# Patient Record
Sex: MALE | Race: WHITE | NOT HISPANIC OR LATINO | Employment: UNEMPLOYED | URBAN - METROPOLITAN AREA
[De-identification: names, ages, dates, MRNs, and addresses within clinical notes are randomized per-mention and may not be internally consistent; named-entity substitution may affect disease eponyms.]

---

## 2023-03-13 ENCOUNTER — TELEPHONE (OUTPATIENT)
Dept: FAMILY MEDICINE | Facility: CLINIC | Age: 28
End: 2023-03-13

## 2023-03-13 NOTE — TELEPHONE ENCOUNTER
Date: 3/13/2023    Client Name:  Sid Miranda  Preferred Name: Sid  MRN: [id number]   : 1995  Age:  27 year old    Presenting Problem / Reason for Assessment:     Compulsive sexual behavior      Suggested Program:  CSB    Interested in Couples/Family Therapy?  No    Any legal charges pending?:   No    Patient wishes to be contacted regarding Insurance benefits?:  No    Insurance Benefits to be evaluated.  Note will be entered when validated.    Please Verify Registration    Please send Welcome Packet and document date sent.

## 2023-03-21 ENCOUNTER — VIRTUAL VISIT (OUTPATIENT)
Dept: PSYCHOLOGY | Facility: CLINIC | Age: 28
End: 2023-03-21
Payer: COMMERCIAL

## 2023-03-21 DIAGNOSIS — F32.A DEPRESSION, UNSPECIFIED DEPRESSION TYPE: ICD-10-CM

## 2023-03-21 DIAGNOSIS — F41.1 GENERALIZED ANXIETY DISORDER: ICD-10-CM

## 2023-03-21 DIAGNOSIS — F15.20 SEVERE STIMULANT USE DISORDER (H): Primary | ICD-10-CM

## 2023-03-21 PROCEDURE — 90791 PSYCH DIAGNOSTIC EVALUATION: CPT | Mod: VID | Performed by: STUDENT IN AN ORGANIZED HEALTH CARE EDUCATION/TRAINING PROGRAM

## 2023-03-21 ASSESSMENT — COLUMBIA-SUICIDE SEVERITY RATING SCALE - C-SSRS
TOTAL  NUMBER OF INTERRUPTED ATTEMPTS LIFETIME: YES
ATTEMPT LIFETIME: YES
6. HAVE YOU EVER DONE ANYTHING, STARTED TO DO ANYTHING, OR PREPARED TO DO ANYTHING TO END YOUR LIFE?: YES
1. IN THE PAST MONTH, HAVE YOU WISHED YOU WERE DEAD OR WISHED YOU COULD GO TO SLEEP AND NOT WAKE UP?: NO
TOTAL  NUMBER OF INTERRUPTED ATTEMPTS PAST 3 MONTHS: NO
REASONS FOR IDEATION LIFETIME: EQUALLY TO GET ATTENTION, REVENGE, OR A REACTION FROM OTHERS AND TO END/STOP THE PAIN
LETHALITY/MEDICAL DAMAGE CODE MOST LETHAL ACTUAL ATTEMPT: NO PHYSICAL DAMAGE OR VERY MINOR PHYSICAL DAMAGE
1. HAVE YOU WISHED YOU WERE DEAD OR WISHED YOU COULD GO TO SLEEP AND NOT WAKE UP?: YES
5. HAVE YOU STARTED TO WORK OUT OR WORKED OUT THE DETAILS OF HOW TO KILL YOURSELF? DO YOU INTEND TO CARRY OUT THIS PLAN?: NO
2. HAVE YOU ACTUALLY HAD ANY THOUGHTS OF KILLING YOURSELF?: NO
4. HAVE YOU HAD THESE THOUGHTS AND HAD SOME INTENTION OF ACTING ON THEM?: YES
TOTAL  NUMBER OF ABORTED OR SELF INTERRUPTED ATTEMPTS PAST 3 MONTHS: NO
2. HAVE YOU ACTUALLY HAD ANY THOUGHTS OF KILLING YOURSELF?: YES
6. HAVE YOU EVER DONE ANYTHING, STARTED TO DO ANYTHING, OR PREPARED TO DO ANYTHING TO END YOUR LIFE?: NO
ATTEMPT PAST THREE MONTHS: NO
3. HAVE YOU BEEN THINKING ABOUT HOW YOU MIGHT KILL YOURSELF?: YES
TOTAL  NUMBER OF PREPARATORY ACTS LIFETIME: 1
TOTAL  NUMBER OF INTERRUPTED ATTEMPTS LIFETIME: 1
5. HAVE YOU STARTED TO WORK OUT OR WORKED OUT THE DETAILS OF HOW TO KILL YOURSELF? DO YOU INTEND TO CARRY OUT THIS PLAN?: YES
TOTAL  NUMBER OF ACTUAL ATTEMPTS LIFETIME: 3
4. HAVE YOU HAD THESE THOUGHTS AND HAD SOME INTENTION OF ACTING ON THEM?: NO
TOTAL  NUMBER OF ABORTED OR SELF INTERRUPTED ATTEMPTS LIFETIME: YES
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
## 2023-03-21 NOTE — NURSING NOTE
Is the patient currently in the state of [place]? YES    Visit mode:VIDEO    If the visit is dropped, the patient can be reconnected by: VIDEO VISIT: Text to cell phone: [phone]    Will anyone else be joining the visit? NO      How would you like to obtain your AVS? Mail a copy    Are changes needed to the allergy or medication list? NO    Reason for visit: Video Visit Return

## 2023-04-04 ENCOUNTER — VIRTUAL VISIT (OUTPATIENT)
Dept: PSYCHOLOGY | Facility: CLINIC | Age: 28
End: 2023-04-04
Payer: COMMERCIAL

## 2023-04-04 DIAGNOSIS — F32.A DEPRESSION, UNSPECIFIED DEPRESSION TYPE: ICD-10-CM

## 2023-04-04 DIAGNOSIS — F41.1 GENERALIZED ANXIETY DISORDER: ICD-10-CM

## 2023-04-04 DIAGNOSIS — F15.20 SEVERE STIMULANT USE DISORDER (H): Primary | ICD-10-CM

## 2023-04-04 PROCEDURE — 90837 PSYTX W PT 60 MINUTES: CPT | Mod: VID | Performed by: STUDENT IN AN ORGANIZED HEALTH CARE EDUCATION/TRAINING PROGRAM

## 2023-04-04 ASSESSMENT — ANXIETY QUESTIONNAIRES
6. BECOMING EASILY ANNOYED OR IRRITABLE: NOT AT ALL
4. TROUBLE RELAXING: SEVERAL DAYS
2. NOT BEING ABLE TO STOP OR CONTROL WORRYING: SEVERAL DAYS
8. IF YOU CHECKED OFF ANY PROBLEMS, HOW DIFFICULT HAVE THESE MADE IT FOR YOU TO DO YOUR WORK, TAKE CARE OF THINGS AT HOME, OR GET ALONG WITH OTHER PEOPLE?: SOMEWHAT DIFFICULT
7. FEELING AFRAID AS IF SOMETHING AWFUL MIGHT HAPPEN: NOT AT ALL
IF YOU CHECKED OFF ANY PROBLEMS ON THIS QUESTIONNAIRE, HOW DIFFICULT HAVE THESE PROBLEMS MADE IT FOR YOU TO DO YOUR WORK, TAKE CARE OF THINGS AT HOME, OR GET ALONG WITH OTHER PEOPLE: SOMEWHAT DIFFICULT
GAD7 TOTAL SCORE: 6
GAD7 TOTAL SCORE: 6
3. WORRYING TOO MUCH ABOUT DIFFERENT THINGS: SEVERAL DAYS
5. BEING SO RESTLESS THAT IT IS HARD TO SIT STILL: SEVERAL DAYS
7. FEELING AFRAID AS IF SOMETHING AWFUL MIGHT HAPPEN: NOT AT ALL
GAD7 TOTAL SCORE: 6
1. FEELING NERVOUS, ANXIOUS, OR ON EDGE: MORE THAN HALF THE DAYS

## 2023-04-04 NOTE — PROGRESS NOTES
[facility] - Progress Note    Date of Service: 23   Name: Sid Miranda  : 1995  Medical Record Number: [id number]  Treating Provider:       Francisco Cannon, Ph.D  Postdoctoral Fellow  Type of Session: Individual  Present in Session: Client and therapist  Session Start and Stop Time: 9:01am-9:55am  Number of Minutes:  54    SERVICE MODALITY:  Video Visit:      Provider verified identity through the following two step process.  Patient provided:  Patient is known previously to provider and Patient was verified at admission/transfer    Telemedicine Visit: The patient's condition can be safely assessed and treated via synchronous audio and visual telemedicine encounter.      Reason for Telemedicine Visit: Patient has requested telehealth visit    Originating Site (Patient Location): Patient's home    Distant Site (Provider Location): [facility]    Consent:  The patient/guardian has verbally consented to: the potential risks and benefits of telemedicine (video visit) versus in person care; bill my insurance or make self-payment for services provided; and responsibility for payment of non-covered services.     Patient would like the video invitation sent by:  My Chart    Mode of Communication:  Video Conference via Ondango    Distant Location (Provider):  On-site    As the provider I attest to compliance with applicable laws and regulations related to telemedicine.    DSM-5 Diagnoses:  Encounter Diagnoses   Name Primary?     Severe stimulant use disorder (H) Yes     Generalized anxiety disorder      Depression, unspecified depression type          Current Reported Symptoms and Status update:  Changes since last session include recent methamphetamine use, some anxiety, some depression. Client has scheduled intake for inpatient substance use treatment for 23.    Progress Toward Treatment Goals:   No improvement     Therapeutic  Interventions/Treatment Strategies:    Area(s) of treatment focus addressed in this session included Symptom Management, Personal Safety/Harm Reduction and Robertson Maintenance/Relapse Prevention    Client reported that he is in a current methamphetamine use episode, with most recent use occurring last night. He reported he was able to participate in the session today and was not currently intoxicated. Client reported this has his most severe relapse due to frequency, quantity, and feeling isolated. He scheduled an intake for inpatient treatment at [facility] for Thursday. Therapist assessed safety. Client reported hopelessness and passive thoughts of death. He denied intent, plan, and means. He identified reasons to live and hope for change. Client reported he is considering moving back to [place]. He noted he needs to eat and sleep to feel better. Therapist encouraged client to eat during the session, which client did. Client and therapist discussed coping/DBT strategies to support client to getting to his intake on Thursday. Client stated he would use coping strategies. He expressed optimism about treatment. He requested to contact therapist tomorrow via Epic message or voicemail as motivation to continue using coping and attend his inpatient intake. Therapist agreed and provided client with therapist's office number. We cancelled client's next session due to plans to be in inpatient treatment.    Psychotherapist offered support, feedback and validation and reinforced use of skills Treatment modalities used include Motivational Interviewing Cognitive Behavioral Therapy  Support, Redirection and Feedback    Patient Response:   Patient responded to session by accepting feedback, giving feedback, listening, focusing on goals and accepting support  Possible barriers to participation / learning include: withdrawal symptoms and severity of symptoms    Current Mental Status Exam:   Appearance:  Appropriate   Eye  Contact:  Fair   Attitude / Demeanor: Cooperative  Suspicious   Speech      Rate / Production: Normal/ Responsive      Volume:  Normal  volume  Orientation:  All  Mood:   Depressed   Affect:   Blunted   Thought Content: Clear   Insight:   Fair       Plan/Need for Future Services:  Return for therapy in 3 weeks to treat diagnosed problems.    A treatment plan was not established today due to concerns of client's recent substance use and need for higher level of care. Will establish treatment plan in future follow-up.    Referral / Collaboration:  Pt will start inpatient substance use treatment.  Emergency Services Needed?  No    Assignment:  Attend intake appt for inpatient treatment.    Interactive Complexity:  There are four specific communication difficulties that complicate the work of the primary psychiatric procedure.  Interactive complexity (+[id number]) may be reported when at least one of these difficulties is present.    Communication difficulties present during current the psychiatric procedure include:  1. None.      Signature/Title:      Francisco Cannon, PhD, LP    [facility], [facility] CAnswers for HPI/ROS submitted by the patient on 4/4/2023  LORENZO 7 TOTAL SCORE: 6    antonioic  Department of Family Medicine and Community Health  [employer]

## 2023-04-04 NOTE — NURSING NOTE
Is the patient currently in the state of [place]? YES    Visit mode:VIDEO    If the visit is dropped, the patient can be reconnected by: VIDEO VISIT: Send to e-mail at: [email or web address]    Will anyone else be joining the visit? NO      How would you like to obtain your AVS? Mail a copy    Are changes needed to the allergy or medication list? NO    Reason for visit: Video Visit Return

## 2023-04-18 ENCOUNTER — VIRTUAL VISIT (OUTPATIENT)
Dept: PSYCHOLOGY | Facility: CLINIC | Age: 28
End: 2023-04-18
Payer: COMMERCIAL

## 2023-04-18 DIAGNOSIS — F32.A DEPRESSION, UNSPECIFIED DEPRESSION TYPE: ICD-10-CM

## 2023-04-18 DIAGNOSIS — F15.20 SEVERE STIMULANT USE DISORDER (H): Primary | ICD-10-CM

## 2023-04-18 DIAGNOSIS — F41.1 GENERALIZED ANXIETY DISORDER: ICD-10-CM

## 2023-04-18 PROCEDURE — 90837 PSYTX W PT 60 MINUTES: CPT | Mod: VID | Performed by: STUDENT IN AN ORGANIZED HEALTH CARE EDUCATION/TRAINING PROGRAM
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
## 2023-04-18 NOTE — NURSING NOTE
Is the patient currently in the state of [place]? YES    Visit mode:VIDEO    If the visit is dropped, the patient can be reconnected by: VIDEO VISIT:  Send e-mail to at [email or web address]    Will anyone else be joining the visit? No  (If patient encounters technical issues they should call [phone])    How would you like to obtain your AVS? MyChart    Are changes needed to the allergy or medication list? N/A    Rooming Documentation: Questionnaire(s) not done per department protocol    Reason for visit: Video Visit       BIANKA Murillo

## 2023-04-25 ENCOUNTER — VIRTUAL VISIT (OUTPATIENT)
Dept: PSYCHOLOGY | Facility: CLINIC | Age: 28
End: 2023-04-25
Payer: COMMERCIAL

## 2023-04-25 DIAGNOSIS — F32.A DEPRESSION, UNSPECIFIED DEPRESSION TYPE: ICD-10-CM

## 2023-04-25 DIAGNOSIS — F41.1 GENERALIZED ANXIETY DISORDER: Primary | ICD-10-CM

## 2023-04-25 DIAGNOSIS — F15.20 SEVERE STIMULANT USE DISORDER (H): ICD-10-CM

## 2023-04-25 PROCEDURE — 90837 PSYTX W PT 60 MINUTES: CPT | Mod: VID | Performed by: STUDENT IN AN ORGANIZED HEALTH CARE EDUCATION/TRAINING PROGRAM

## 2023-04-25 NOTE — NURSING NOTE
Is the patient currently in the state of [place]? YES    Visit mode:VIDEO    If the visit is dropped, the patient can be reconnected by: VIDEO VISIT:  Send e-mail to at [email or web address]    Will anyone else be joining the visit? No  (If patient encounters technical issues they should call [phone])    How would you like to obtain your AVS? MyChart    Are changes needed to the allergy or medication list? NO    Rooming Documentation: Patient will complete qnrs in mychart    Reason for visit: Video Visit     BIANKA Narvaez
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
## 2023-05-09 ENCOUNTER — VIRTUAL VISIT (OUTPATIENT)
Dept: PSYCHOLOGY | Facility: CLINIC | Age: 28
End: 2023-05-09
Payer: COMMERCIAL

## 2023-05-09 DIAGNOSIS — F15.20 SEVERE STIMULANT USE DISORDER (H): Primary | ICD-10-CM

## 2023-05-09 DIAGNOSIS — F41.1 GENERALIZED ANXIETY DISORDER: ICD-10-CM

## 2023-05-09 DIAGNOSIS — F32.A DEPRESSION, UNSPECIFIED DEPRESSION TYPE: ICD-10-CM

## 2023-05-09 PROCEDURE — 90837 PSYTX W PT 60 MINUTES: CPT | Mod: VID | Performed by: STUDENT IN AN ORGANIZED HEALTH CARE EDUCATION/TRAINING PROGRAM

## 2023-05-09 NOTE — PROGRESS NOTES
[place] for Sexual [facility] - Progress Note    Date of Service: 23   Name: Sid Miranda  : 1995  Medical Record Number: [id number]  Treating Provider:       Francisco Cannon, Ph.D  Postdoctoral Fellow  Type of Session: Individual  Present in Session: Client and therapist  Session Start and Stop Time: 9:00am-9:55am  Number of Minutes:  55    SERVICE MODALITY:  Video Visit:      Provider verified identity through the following two step process.  Patient provided:  Patient is known previously to provider and Patient was verified at admission/transfer    Telemedicine Visit: The patient's condition can be safely assessed and treated via synchronous audio and visual telemedicine encounter.      Reason for Telemedicine Visit: Patient has requested telehealth visit    Originating Site (Patient Location): Patient's home    Distant Site (Provider Location): [facility]    Consent:  The patient/guardian has verbally consented to: the potential risks and benefits of telemedicine (video visit) versus in person care; bill my insurance or make self-payment for services provided; and responsibility for payment of non-covered services.     Patient would like the video invitation sent by:  My Chart    Mode of Communication:  Video Conference via [employer]    Distant Location (Provider):  On-site    As the provider I attest to compliance with applicable laws and regulations related to telemedicine.    DSM-5 Diagnoses:  Encounter Diagnoses   Name Primary?     Severe stimulant use disorder (H) Yes     Generalized anxiety disorder      Depression, unspecified depression type        Current Reported Symptoms and Status update:  Changes since last session include abstinence from meth for 30 days, intake for substance use disorder IOP scheduled, improved depression, some anxiety and worry, working with his sponsor, has a job interview.    Generalized Anxiety Disorder: Excessive  anxiety and worry about a number of events or activities (such as work or school performance); difficulty controlling worry; restlessness or feeling keyed up or on edge; difficulty concentrating or mind going blank; irritability.      Unspecified Depressive Disorder: Client reports symptoms of depression that do not meet full criteria for diagnosis of MDD. Sometimes include hopelessness, depressed mood, irritability, and anhedonia.      Severe Stimulant Use Disorder: Client has a history of methamphetamine use that significantly negatively impacts functioning, including financial stress, loss of employment, and loss of relationships. He continued to use despite negative consequences. Client has a history of sexual behavior he describes as compulsive when using meth. Further assessment needed to determine if compulsive sexual behaviors also occur separately from meth use.     Progress Toward Treatment Goals:   Satisfactory progress     Diagnostic assessment 3/21/23  Tx plan 4/18/23 5/9/23 - sober from meth 30 days    Therapeutic Interventions/Treatment Strategies:    Area(s) of treatment focus addressed in this session included Symptom Management and Sexual Health and Wellness    Client shared that he is 1 month sober from meth and has an intake scheduled with [facility]. He also has a job interview. We reviewed client's current sources of support, including 4 12-step groups per week, weekly meetings with his sponsor, friends, and family. Client reported his anxiety and depression have improved. He processed having low self-esteem. He described how he often feels like others are judging him for his behavior. Therapist and client discussed how this may contribute to depression and anxiety. We discussed long-term for plans to address identify development and self-esteem as he continues to work towards having a stable foundation. We discussed prioritizing work and IOP treatment for now. Client agreed.    Psychotherapist  offered support, feedback and validation and reinforced use of skills Treatment modalities used include Cognitive Behavioral Therapy Interpersonal  Support and Feedback    Patient Response:   Patient responded to session by accepting feedback, giving feedback, listening, focusing on goals and accepting support  Possible barriers to participation / learning include: N/A    Current Mental Status Exam:   Appearance:  Appropriate   Eye Contact:  Good   Attitude / Demeanor: Cooperative   Speech      Rate / Production: Normal/ Responsive      Volume:  Normal  volume  Orientation:  All  Mood:   Normal  Affect:   Appropriate   Thought Content: Clear   Insight:   Good       Plan/Need for Future Services:  Return for therapy in 2 weeks to treat diagnosed problems.    Patient has a current master individualized treatment plan.  See Epic treatment plan for more information.    Referral / Collaboration:  Referral to another professional/service is not indicated at this time..  Emergency Services Needed?  No    Assignment:  IOP Intake  Continue self-care and social support    Interactive Complexity:  There are four specific communication difficulties that complicate the work of the primary psychiatric procedure.  Interactive complexity (+11213) may be reported when at least one of these difficulties is present.    Communication difficulties present during current the psychiatric procedure include:  1. None.      Signature/Title:    Francisco Cannon, PhD, LP    [place] [facility] Sexual [facility], Sexual and Gender Health Clinic  Department of Family Medicine and Community Health  [employer] [facility] [employer]

## 2023-05-23 ENCOUNTER — VIRTUAL VISIT (OUTPATIENT)
Dept: PSYCHOLOGY | Facility: CLINIC | Age: 28
End: 2023-05-23
Payer: COMMERCIAL

## 2023-05-23 DIAGNOSIS — F32.A DEPRESSION, UNSPECIFIED DEPRESSION TYPE: ICD-10-CM

## 2023-05-23 DIAGNOSIS — F41.1 GENERALIZED ANXIETY DISORDER: ICD-10-CM

## 2023-05-23 DIAGNOSIS — F15.20 SEVERE STIMULANT USE DISORDER (H): Primary | ICD-10-CM

## 2023-05-23 PROCEDURE — 90837 PSYTX W PT 60 MINUTES: CPT | Mod: VID | Performed by: STUDENT IN AN ORGANIZED HEALTH CARE EDUCATION/TRAINING PROGRAM

## 2023-05-23 NOTE — PROGRESS NOTES
[place] [facility] - Progress Note    Date of Service: 23   Name: Sid Miranda  : 1995  Medical Record Number: [id number]  Treating Provider:       Francisco Cannon, Ph.D  Type of Session: Individual  Present in Session: Client and therapist  Session Start and Stop Time: 9:03am-9:56am  Number of Minutes:  53    SERVICE MODALITY:  Video Visit:      Provider verified identity through the following two step process.  Patient provided:  Patient is known previously to provider and Patient was verified at admission/transfer    Telemedicine Visit: The patient's condition can be safely assessed and treated via synchronous audio and visual telemedicine encounter.      Reason for Telemedicine Visit: Patient has requested telehealth visit    Originating Site (Patient Location): Patient's home    Distant Site (Provider Location): [facility]    Consent:  The patient/guardian has verbally consented to: the potential risks and benefits of telemedicine (video visit) versus in person care; bill my insurance or make self-payment for services provided; and responsibility for payment of non-covered services.     Patient would like the video invitation sent by:  My Chart    Mode of Communication:  Video Conference via [employer]    Distant Location (Provider):  On-site    As the provider I attest to compliance with applicable laws and regulations related to telemedicine.    DSM-5 Diagnoses:  Encounter Diagnoses   Name Primary?     Severe stimulant use disorder (H) Yes     Generalized anxiety disorder      Depression, unspecified depression type          Current Reported Symptoms and Status update:  Changes since last session include sobriety from meth, continuing to engage social support and 12-step programming, improvement in mood, anxiety reasonably controlled.    Generalized Anxiety Disorder: Excessive anxiety and worry about a number of events or activities  (such as work or school performance); difficulty controlling worry; restlessness or feeling keyed up or on edge; difficulty concentrating or mind going blank; irritability.      Unspecified Depressive Disorder: Client reports symptoms of depression that do not meet full criteria for diagnosis of MDD. Sometimes include hopelessness, depressed mood, irritability, and anhedonia.      Severe Stimulant Use Disorder: Client has a history of methamphetamine use that significantly negatively impacts functioning, including financial stress, loss of employment, and loss of relationships. He continued to use despite negative consequences. Client has a history of sexual behavior he describes as compulsive when using meth. Further assessment needed to determine if compulsive sexual behaviors also occur separately from meth use.       Progress Toward Treatment Goals:   Satisfactory progress     Diagnostic assessment 3/21/23  Tx plan 4/18/23 5/9/23 - sober from meth 30 days      Therapeutic Interventions/Treatment Strategies:    Area(s) of treatment focus addressed in this session included Symptom Management and Interpersonal Relationship Skills    Client shared that he has maintained sobriety from methamphetamine. We reviewed his supports and community he is building through 12-step programs. He attends 4 groups per week. Client shared that he is hoping to soon be employed by the [employer] and recently did a work physical. He is looking forward to more structure and financial security. Client processed challenging relationship dynamics with his friend, as well as emerging relationship dynamics with a potential new partner. He discussed choosing to not have sex right now. He reviewed distress tolerance skills he is practicing related to meth and sex. We discussed boundaries he might consider for sexual behavior to support his recovery. Client assigned to create boundaries in line with sexual/relationship  values.    Psychotherapist offered support, feedback and validation and reinforced use of skills Treatment modalities used include Cognitive Behavioral Therapy Interpersonal  Support and Feedback    Patient Response:   Patient responded to session by accepting feedback, giving feedback, listening, focusing on goals and accepting support  Possible barriers to participation / learning include: N/A    Current Mental Status Exam:   Appearance:  Appropriate   Eye Contact:  Good   Attitude / Demeanor: Cooperative   Speech      Rate / Production: Normal/ Responsive      Volume:  Normal  volume  Orientation:  All  Mood:   Normal  Affect:   Appropriate   Thought Content: Clear   Insight:   Good       Plan/Need for Future Services:  Return for therapy in 2 weeks to treat diagnosed problems.    Patient has a current master individualized treatment plan.  See Epic treatment plan for more information.    Referral / Collaboration:  Referral to another professional/service is not indicated at this time..  Emergency Services Needed?  No    Assignment:  Boundaries    Interactive Complexity:  There are four specific communication difficulties that complicate the work of the primary psychiatric procedure.  Interactive complexity (+76438) may be reported when at least one of these difficulties is present.    Communication difficulties present during current the psychiatric procedure include:  1. None.      Signature/Title:           Francisco Cannon, PhD, LP    [place] [facility], [facility]  Department of Family Medicine and Community Health  [facility] [employer]

## 2023-05-23 NOTE — NURSING NOTE
Is the patient currently in the state of [place]? YES    Visit mode:VIDEO    If the visit is dropped, the patient can be reconnected by: VIDEO VISIT:  Send e-mail to at [email or web address]    Will anyone else be joining the visit? No  (If patient encounters technical issues they should call [phone])    How would you like to obtain your AVS? MyChart    Are changes needed to the allergy or medication list? N/A    Rooming Documentation: Questionnaire(s) not done per department protocol.    Reason for visit: RECHECK     BIANKA Guerrero

## 2023-06-19 ENCOUNTER — VIRTUAL VISIT (OUTPATIENT)
Dept: PSYCHOLOGY | Facility: CLINIC | Age: 28
End: 2023-06-19
Payer: MEDICAID

## 2023-06-19 DIAGNOSIS — F15.20 SEVERE STIMULANT USE DISORDER (H): ICD-10-CM

## 2023-06-19 DIAGNOSIS — F32.A DEPRESSION, UNSPECIFIED DEPRESSION TYPE: ICD-10-CM

## 2023-06-19 DIAGNOSIS — F41.1 GENERALIZED ANXIETY DISORDER: Primary | ICD-10-CM

## 2023-06-19 PROCEDURE — 90837 PSYTX W PT 60 MINUTES: CPT | Mod: VID | Performed by: STUDENT IN AN ORGANIZED HEALTH CARE EDUCATION/TRAINING PROGRAM

## 2023-06-19 NOTE — PROGRESS NOTES
[place] [facility] - Progress Note    Date of Service: 23   Name: Sid Miranda  : 1995  Medical Record Number: [id number]  Treating Provider: Frnacisco Cannon, Ph.D   Type of Session: Individual  Present in Session: Client and therapist  Session Start and Stop Time: 5:01pm-5:55pm  Number of Minutes:  54    SERVICE MODALITY:  Video Visit:      Provider verified identity through the following two step process.  Patient provided:  Patient is known previously to provider and Patient was verified at admission/transfer    Telemedicine Visit: The patient's condition can be safely assessed and treated via synchronous audio and visual telemedicine encounter.      Reason for Telemedicine Visit: Patient has requested telehealth visit    Originating Site (Patient Location): Patient's home    Distant Site (Provider Location): [facility]    Consent:  The patient/guardian has verbally consented to: the potential risks and benefits of telemedicine (video visit) versus in person care; bill my insurance or make self-payment for services provided; and responsibility for payment of non-covered services.     Patient would like the video invitation sent by:  My Chart    Mode of Communication:  Video Conference via [employer]    Distant Location (Provider):  On-site    As the provider I attest to compliance with applicable laws and regulations related to telemedicine.    DSM-5 Diagnoses:  Encounter Diagnoses   Name Primary?     Generalized anxiety disorder Yes     Depression, unspecified depression type      Severe stimulant use disorder (H)        Current Reported Symptoms and Status update:  Changes since last session include increased anxiety resulting in 1 use episode, resuming abstinence from meth, starting a new job and relationship.      Generalized Anxiety Disorder: Excessive anxiety and worry about a number of events or activities (such as work or school  performance); difficulty controlling worry; restlessness or feeling keyed up or on edge; difficulty concentrating or mind going blank; irritability.      Unspecified Depressive Disorder: Client reports symptoms of depression that do not meet full criteria for diagnosis of MDD. Sometimes include hopelessness, depressed mood, irritability, and anhedonia.      Severe Stimulant Use Disorder: Client has a history of methamphetamine use that significantly negatively impacts functioning, including financial stress, loss of employment, and loss of relationships. He continued to use despite negative consequences. Client has a history of sexual behavior he describes as compulsive when using meth. Further assessment needed to determine if compulsive sexual behaviors also occur separately from meth use.     Progress Toward Treatment Goals:   Satisfactory progress     Diagnostic assessment 3/21/23  Tx plan 4/18/23 5/9/23 - sober from meth 30 days  6/19/23 - 1 use episode approx 2 weeks ago. Sobriety since.    Therapeutic Interventions/Treatment Strategies:    Area(s) of treatment focus addressed in this session included Symptom Management and Interpersonal Relationship Skills    Client shared that he started work and is finding meaning in the work he is doing. He shared long-term plans related to work, including to be hired permanently next year. Client also shared that he is in a new relationship and his partner is staying with him prior to his partner having to report to retirement. Client identified ways in which they are supporting each other emotionally and highlighted his partner's strengths/resiliency. Client processed a recent use episode triggered by anxiety (self-reported as paranoia) related to body image and feeling like people are conspiring against him to trick him into believing he is making progress when he is not. Client described ability to identify this as anxious/paranoid thinking and trying to talk himself out of  it. He said the tension between knowing it is not true and believing it is related to him using in order to stop the internal tension. He identified other ways in which he is able to reduce this tension without using meth, including engaging his support network. We reviewed DBT skills that support him with distress tolerance. Therapist and client discussed client's plans for sobriety and effective coping given his partner's upcoming reporting to half-way.     Psychotherapist offered support, feedback and validation and reinforced use of skills Treatment modalities used include Cognitive Behavioral Therapy Dialectical Behavioral Therapy Interpersonal  Support, Feedback and Structured Activity    Patient Response:   Patient responded to session by accepting feedback, giving feedback, listening, focusing on goals and accepting support  Possible barriers to participation / learning include: N/A    Current Mental Status Exam:   Appearance:  Appropriate   Eye Contact:  Good   Attitude / Demeanor: Cooperative   Speech      Rate / Production: Normal/ Responsive      Volume:  Normal  volume  Orientation:  All  Mood:   Normal  Affect:   Appropriate   Thought Content: Clear   Insight:   Good       Plan/Need for Future Services:  Return for therapy in 2 weeks to treat diagnosed problems.    Patient has a current master individualized treatment plan.  See Epic treatment plan for more information.    Referral / Collaboration:  Referral to another professional/service is not indicated at this time..  Emergency Services Needed?  No    Assignment:  Coping, support network, 12-step programs    Interactive Complexity:  There are four specific communication difficulties that complicate the work of the primary psychiatric procedure.  Interactive complexity (+12813) may be reported when at least one of these difficulties is present.    Communication difficulties present during current the psychiatric procedure  include:  1. None.      Signature/Title:    Francisco Cannon, PhD, LP    [place] [facility], [facility]  Department of Family Medicine and Community Health  [facility]

## 2023-07-10 ENCOUNTER — VIRTUAL VISIT (OUTPATIENT)
Dept: PSYCHOLOGY | Facility: CLINIC | Age: 28
End: 2023-07-10
Payer: COMMERCIAL

## 2023-07-10 DIAGNOSIS — F32.A DEPRESSION, UNSPECIFIED DEPRESSION TYPE: Primary | ICD-10-CM

## 2023-07-10 DIAGNOSIS — F15.20 SEVERE STIMULANT USE DISORDER (H): ICD-10-CM

## 2023-07-10 DIAGNOSIS — F41.1 GENERALIZED ANXIETY DISORDER: ICD-10-CM

## 2023-07-10 PROCEDURE — 90837 PSYTX W PT 60 MINUTES: CPT | Mod: VID | Performed by: STUDENT IN AN ORGANIZED HEALTH CARE EDUCATION/TRAINING PROGRAM

## 2023-07-10 NOTE — NURSING NOTE
Is the patient currently in the state of [place]? YES    Visit mode:VIDEO    If the visit is dropped, the patient can be reconnected by: VIDEO VISIT: Send to e-mail at: [email or web address]    Will anyone else be joining the visit? NO      How would you like to obtain your AVS? MyChart    Are changes needed to the allergy or medication list? NO    Reason for visit: RECHECK

## 2023-07-10 NOTE — PROGRESS NOTES
[place] [facility] - Progress Note    Date of Service: 7/10/23   Name: Sid Miranda  : 1995  Medical Record Number: [id number]  Treating Provider: Francisco Cannon, Ph.D  Type of Session: Individual  Present in Session: Client and therapist  Session Start and Stop Time: 5:00pm-5:55pm  Number of Minutes:  55    SERVICE MODALITY:  Video Visit:      Provider verified identity through the following two step process.  Patient provided:  Patient is known previously to provider and Patient was verified at admission/transfer    Telemedicine Visit: The patient's condition can be safely assessed and treated via synchronous audio and visual telemedicine encounter.      Reason for Telemedicine Visit: Patient has requested telehealth visit    Originating Site (Patient Location): Patient's home    Distant Site (Provider Location): [facility]    Consent:  The patient/guardian has verbally consented to: the potential risks and benefits of telemedicine (video visit) versus in person care; bill my insurance or make self-payment for services provided; and responsibility for payment of non-covered services.     Patient would like the video invitation sent by:  My Chart    Mode of Communication:  Video Conference via [employer]    Distant Location (Provider):  On-site    As the provider I attest to compliance with applicable laws and regulations related to telemedicine.    DSM-5 Diagnoses:  Encounter Diagnoses   Name Primary?     Generalized anxiety disorder      Severe stimulant use disorder (H)      Depression, unspecified depression type Yes         Current Reported Symptoms and Status update:  Changes since last session include increased depression/anxiety and recent meth use (2 episodes).    Generalized Anxiety Disorder: Excessive anxiety and worry about a number of events or activities (such as work or school performance); difficulty controlling worry;  restlessness or feeling keyed up or on edge; difficulty concentrating or mind going blank; irritability.      Unspecified Depressive Disorder: Client reports symptoms of depression that do not meet full criteria for diagnosis of MDD. Sometimes include hopelessness, depressed mood, irritability, and anhedonia.      Severe Stimulant Use Disorder: Client has a history of methamphetamine use that significantly negatively impacts functioning, including financial stress, loss of employment, and loss of relationships. He continued to use despite negative consequences. Client has a history of sexual behavior he describes as compulsive when using meth. Further assessment needed to determine if compulsive sexual behaviors also occur separately from meth use.       Progress Toward Treatment Goals:   No improvement     Therapeutic Interventions/Treatment Strategies:    Area(s) of treatment focus addressed in this session included Symptom Management    Client processed recent meth use episodes (2 total, each lasting 1 day). We discussed antecedents, with client noting particular concerns around body image. We practiced gratitude activities for his body and to reduce body image concerns. Client also processed the effects of depression on self-worth/self-concept. We discussed client starting IOP for meth use. Client plans to call the [facility] today to schedule intake. Client also asked about referrals to see a psychiatrist. Therapist informed client that clinical care coordinator Dave Cortez can assist with finding psychiatric providers. Client agreed; therapist submitted a referral to Dave.    Psychotherapist offered support, feedback and validation and reinforced use of skills Treatment modalities used include Cognitive Behavioral Therapy  Support and Feedback    Patient Response:   Patient responded to session by accepting feedback, giving feedback, listening, focusing on goals and accepting support  Possible  barriers to participation / learning include: N/A    Current Mental Status Exam:   Appearance:  Appropriate   Eye Contact:  Good   Attitude / Demeanor: Cooperative   Speech      Rate / Production: Normal/ Responsive      Volume:  Normal  volume  Orientation:  All  Mood:   Normal  Affect:   Appropriate   Thought Content: Clear   Insight:   Good       Plan/Need for Future Services:  Return for therapy in 2 weeks to treat diagnosed problems.    Patient has a current master individualized treatment plan.  See Epic treatment plan for more information.    Referral / Collaboration:  Referral to another professional/service is not indicated at this time..  Emergency Services Needed?  No    Assignment:  Contact [facility]    Interactive Complexity:  There are four specific communication difficulties that complicate the work of the primary psychiatric procedure.  Interactive complexity (+10513) may be reported when at least one of these difficulties is present.    Communication difficulties present during current the psychiatric procedure include:  1. None.      Signature/Title:      Francisco Cannon, PhD, LP    [place] [facility], Sexual and [facility] Health Clinic  Department of Family Medicine and Community Health  [facility]

## 2023-07-12 ENCOUNTER — PATIENT OUTREACH (OUTPATIENT)
Dept: CARE COORDINATION | Facility: CLINIC | Age: 28
End: 2023-07-12
Payer: COMMERCIAL

## 2023-07-12 NOTE — LETTER
[facility]: Care Plan    My Information     Name: Sid Miranda   YOB: 1995  Phone Number(s):  [phone] (home)   Address: [place] [address]  [address] [id number]    My Access Plan     Who to contact when I need help:  During business hours, call the clinic at: [phone]  After hours call the resident on call at:  [phone]  Preferred Hospital: Other  Preferred Pharmacy: No Pharmacies Listed  Other     My Care Team Members     Patient Care Team         Relationship Specialty Notifications Start End    No Ref-Primary, Physician PCP - General   7/27/23     Fax: [phone]         Francisco Cannon, PhD Assigned Behavioral Health Provider   4/1/23     Phone: [phone] Fax: [phone]         [facility] [address] [address]    Dave Cortez LSW Specialty Care Coordinator  Admissions 7/11/23               My Medications     No current outpatient medications on file.       My Life     Current Living Arrangement:  Current living arrangement:: I live alone  Type of residence:: Apartment   Type of residence: Apartment    Resources:       Community Resources: OP Mental Health,    Supplies Currently Used at Home: None   Equipment Currently Used at Home: none     Advance Care Plan/Directive  Advanced Care Plans/Directives on file:: No  Type Advanced Care Plans/Directives: Other  Advanced Care Plan/Directive Status: Declined Further Information     Referrals Placed: Mental Health    Activities that can be difficult for me at times:   Dependent ADLs:  Dependent ADLs:: Independent   Depended IADLs: Dependent IADLs:: Independent   Mobility status: Mobility Status: Independent      My Health     There are no problems to display for this patient.        My Goals   Barriers: Wait lists for Psychiatric care  Strengths: Informed and Flexible  Patient expressed understanding of goal: yes  Action steps to achieve this goal:  1. I will  follow up with potential psychiatric providers as suggested by the [facility] .  2. I will be placed on a wait list if needed for a potential provider.  3. I will coordinate and collaborate with  at the [facility] to navigate any challenges, should the arise.

## 2023-07-12 NOTE — PROGRESS NOTES
Clinic Care Coordination Contact  [facility]/Voicemail       Clinical Data: Care Coordinator Outreach  Outreach attempted x 1.  Left message on patient's voicemail with call back information and requested return call.  Plan: Care Coordinator will send care coordination introduction letter with care coordinator contact information and explanation of care coordination services via [employer]. Care Coordinator will try to reach patient again in 1-2 business days.    PUNEET Chatman  Social Work Care Coordinator  [facility]  [phone]  [email or web address]  Pronouns: They/He

## 2023-07-14 NOTE — PROGRESS NOTES
Clinic Care Coordination Contact  [facility]/Voicemail       Clinical Data: Care Coordinator Outreach  Outreach attempted x 2.  Left message on patient's voicemail with call back information and requested return call.  Plan: Care Coordinator will send care coordination introduction letter with care coordinator contact information and explanation of care coordination services via mail. Care Coordinator will try to reach patient again in 10-13 business days.    PUNEET Chatman  Social Work Care Coordinator  [facility]  [phone]  [email or web address]  Pronouns: They/He

## 2023-07-17 ENCOUNTER — VIRTUAL VISIT (OUTPATIENT)
Dept: PSYCHOLOGY | Facility: CLINIC | Age: 28
End: 2023-07-17
Payer: COMMERCIAL

## 2023-07-17 DIAGNOSIS — F15.20 SEVERE STIMULANT USE DISORDER (H): ICD-10-CM

## 2023-07-17 DIAGNOSIS — F41.1 GENERALIZED ANXIETY DISORDER: ICD-10-CM

## 2023-07-17 DIAGNOSIS — F32.A DEPRESSION, UNSPECIFIED DEPRESSION TYPE: Primary | ICD-10-CM

## 2023-07-17 PROCEDURE — 90837 PSYTX W PT 60 MINUTES: CPT | Mod: VID | Performed by: STUDENT IN AN ORGANIZED HEALTH CARE EDUCATION/TRAINING PROGRAM

## 2023-07-17 NOTE — PROGRESS NOTES
[place] [facility] - Progress Note    Date of Service: 23   Name: Sid Miranda  : 1995  Medical Record Number: [id number]  Treating Provider: Francisco Cannon, Ph.D,   Type of Session: Individual  Present in Session: Client and therapist  Session Start and Stop Time: 5:00pm-5:55pm  Number of Minutes:  55    SERVICE MODALITY:  Video Visit:      Provider verified identity through the following two step process.  Patient provided:  Patient is known previously to provider and Patient was verified at admission/transfer    Telemedicine Visit: The patient's condition can be safely assessed and treated via synchronous audio and visual telemedicine encounter.      Reason for Telemedicine Visit: Patient has requested telehealth visit    Originating Site (Patient Location): Patient's home    Distant Site (Provider Location): [facility]    Consent:  The patient/guardian has verbally consented to: the potential risks and benefits of telemedicine (video visit) versus in person care; bill my insurance or make self-payment for services provided; and responsibility for payment of non-covered services.     Patient would like the video invitation sent by:  My Chart    Mode of Communication:  Video Conference via [employer]    Distant Location (Provider):  On-site    As the provider I attest to compliance with applicable laws and regulations related to telemedicine.    DSM-5 Diagnoses:  Encounter Diagnoses   Name Primary?     Depression, unspecified depression type Yes     Generalized anxiety disorder      Severe stimulant use disorder (H)          Current Reported Symptoms and Status update:  Changes since last session include reasonably maintained depression and anxiety, no recent meth use, working on more effective thinking and coping skills.    Generalized Anxiety Disorder: Excessive anxiety and worry about a number of events or activities (such as work or  "school performance); difficulty controlling worry; restlessness or feeling keyed up or on edge; difficulty concentrating or mind going blank; irritability.      Unspecified Depressive Disorder: Client reports symptoms of depression that do not meet full criteria for diagnosis of MDD. Sometimes include hopelessness, depressed mood, irritability, and anhedonia.      Severe Stimulant Use Disorder: Client has a history of methamphetamine use that significantly negatively impacts functioning, including financial stress, loss of employment, and loss of relationships. He continued to use despite negative consequences. Client has a history of sexual behavior he describes as compulsive when using meth. Further assessment needed to determine if compulsive sexual behaviors also occur separately from meth use.       Progress Toward Treatment Goals:   Satisfactory progress     Therapeutic Interventions/Treatment Strategies:    Area(s) of treatment focus addressed in this session included Symptom Management, Interpersonal Relationship Skills and Sexual Health and Wellness    Client shared his plans to not participate in IOP but instead engage in outpatient substance use supports and maintain stability by working and engaging social support. Client shared that he feels it is important to maintain commitments rather than avoid them. We reviewed his sobriety plan. Client shared spiritual practices that support his recovery and sexual health. We discussed how to integrate these into treatment. Client shared how he is challenging depressive and anxious thoughts. In the process, we identified a thinking pattern (\"fortune telling\") and ways he could challenge that. Provided psychoeducation on differences between planning and fortune telling. Client expressed finding this helpful and wanting to think about it.    Psychotherapist offered support, feedback and validation and reinforced use of skills Treatment modalities used include " Cognitive Behavioral Therapy  Support and Feedback    Patient Response:   Patient responded to session by accepting feedback, giving feedback, listening, focusing on goals and accepting support  Possible barriers to participation / learning include: N/A    Current Mental Status Exam:   Appearance:  Appropriate   Eye Contact:  Good   Attitude / Demeanor: Cooperative   Speech      Rate / Production: Normal/ Responsive      Volume:  Normal  volume  Orientation:  All  Mood:   Normal  Affect:   Appropriate   Thought Content: Clear   Insight:   Good       Plan/Need for Future Services:  Return for therapy in 2 weeks to treat diagnosed problems.    Patient has a current master individualized treatment plan.  See Epic treatment plan for more information.    Referral / Collaboration:  Referral to another professional/service is not indicated at this time..  Emergency Services Needed?  No    Assignment:  Continue challenging ineffective thinking patterns    Interactive Complexity:  There are four specific communication difficulties that complicate the work of the primary psychiatric procedure.  Interactive complexity (+87286) may be reported when at least one of these difficulties is present.    Communication difficulties present during current the psychiatric procedure include:  1. None.      Signature/Title:    Francisco Cannon, PhD, LP    [place] [facility], [facility]  Department of Family Medicine and Community Health  [facility]

## 2023-07-17 NOTE — NURSING NOTE
Is the patient currently in the state of [place]? YES    Visit mode:VIDEO    If the visit is dropped, the patient can be reconnected by: VIDEO VISIT: Send to e-mail at: [email or web address]    Will anyone else be joining the visit? NO      How would you like to obtain your AVS? MyChart    Are changes needed to the allergy or medication list? NO    Reason for visit: RECHECK

## 2023-07-31 ENCOUNTER — VIRTUAL VISIT (OUTPATIENT)
Dept: PSYCHOLOGY | Facility: CLINIC | Age: 28
End: 2023-07-31
Payer: COMMERCIAL

## 2023-07-31 DIAGNOSIS — F15.20 SEVERE STIMULANT USE DISORDER (H): ICD-10-CM

## 2023-07-31 DIAGNOSIS — F41.1 GENERALIZED ANXIETY DISORDER: ICD-10-CM

## 2023-07-31 DIAGNOSIS — F32.A DEPRESSION, UNSPECIFIED DEPRESSION TYPE: Primary | ICD-10-CM

## 2023-07-31 PROCEDURE — 90837 PSYTX W PT 60 MINUTES: CPT | Mod: VID | Performed by: STUDENT IN AN ORGANIZED HEALTH CARE EDUCATION/TRAINING PROGRAM

## 2023-07-31 NOTE — PROGRESS NOTES
Clinic Care Coordination Contact  This worker called pt and briefly spoke to them regarding ongoing need for long term psychiatric provider. Pt has upcoming primary care appt where they believe they will be prescribed needed medication, but will need a psychiatrist for ongoing medication management. Pt had limited time due to having to go back to work and requested this worker call back tomorrow (8/1/23) at 2:45p to complete care coordination screening. This worker and pt were able to form goal to find psychiatric provider, and requested this worker send resources via email.    Pt provided verbal confirmation to send resources in a non secure manner via email.    This worker sent intro letter with psychiatric providers and consent to email communication to pt's email on 7/31/23 per pt request.     This worker will call again on 8/1/23 to complete care coordination screenings and complete care plan for pt.     PUNEET Chatman  Social Work Care Coordinator  [facility]  [phone]  [email or web address]  Pronouns: They/He       room air

## 2023-07-31 NOTE — NURSING NOTE
Is the patient currently in the state of [place]? YES    Visit mode:VIDEO    If the visit is dropped, the patient can be reconnected by: VIDEO VISIT: Send to e-mail at: [email or web address]    Will anyone else be joining the visit? NO      How would you like to obtain your AVS? MyChart    Are changes needed to the allergy or medication list? NO    Reason for visit: RECHECK

## 2023-07-31 NOTE — PROGRESS NOTES
[place] [facility] - Progress Note    Date of Service: 23   Name: Sid Miranda  : 1995  Medical Record Number: [id number]  Treating Provider: Francisco Cannon, Ph.D,   Type of Session: Individual  Present in Session: Client and therapist  Session Start and Stop Time: 5:00pm-5:53pm  Number of Minutes:  53    SERVICE MODALITY:  Video Visit:      Provider verified identity through the following two step process.  Patient provided:  Patient is known previously to provider and Patient was verified at admission/transfer    Telemedicine Visit: The patient's condition can be safely assessed and treated via synchronous audio and visual telemedicine encounter.      Reason for Telemedicine Visit: Patient has requested telehealth visit    Originating Site (Patient Location): Patient's home    Distant Site (Provider Location): [facility]    Consent:  The patient/guardian has verbally consented to: the potential risks and benefits of telemedicine (video visit) versus in person care; bill my insurance or make self-payment for services provided; and responsibility for payment of non-covered services.     Patient would like the video invitation sent by:  My Chart    Mode of Communication:  Video Conference via [employer]    Distant Location (Provider):  On-site    As the provider I attest to compliance with applicable laws and regulations related to telemedicine.    DSM-5 Diagnoses:  Encounter Diagnoses   Name Primary?     Depression, unspecified depression type Yes     Generalized anxiety disorder      Severe stimulant use disorder (H)      Current Reported Symptoms and Status update:  Changes since last session include recent depression, negative thinking patterns, anxiety, recent meth use (Friday, 1 day of use).    Generalized Anxiety Disorder: Excessive anxiety and worry about a number of events or activities (such as work or school performance); difficulty  controlling worry; restlessness or feeling keyed up or on edge; difficulty concentrating or mind going blank; irritability.      Unspecified Depressive Disorder: Client reports symptoms of depression that do not meet full criteria for diagnosis of MDD. Sometimes include hopelessness, depressed mood, irritability, and anhedonia.      Severe Stimulant Use Disorder: Client has a history of methamphetamine use that significantly negatively impacts functioning, including financial stress, loss of employment, and loss of relationships. He continued to use despite negative consequences. Client has a history of sexual behavior he describes as compulsive when using meth. Further assessment needed to determine if compulsive sexual behaviors also occur separately from meth use.       Progress Toward Treatment Goals:   Satisfactory progress     Diagnostic assessment 3/21/23  Tx plan 4/18/23      Therapeutic Interventions/Treatment Strategies:    Area(s) of treatment focus addressed in this session included Symptom Management and Interpersonal Relationship Skills    Client processed a recent meth use episode (this past Friday). He described one day of use, stopping, and then returning to work today. Client expressed depressive symptoms/thinking contributing to use. He noted that a significant contributing factor is feeling like he is not making progress towards goals, so maintaining sobriety does not matter. Client expressed frustration and disappointment for use, and feeling like he is working hard. Therapist supported client to identify successes. Client identified maintaining work, sticking with commitments, yoga and health, showing up in his relationship, and doing therapy as successes. Therapist added decrease in number of use episodes, decrease in quantity of use, and increase in functioning. Assigned client to continue highlighting successes and journaling them.     Psychotherapist offered support, feedback and validation  and reinforced use of skills Treatment modalities used include Cognitive Behavioral Therapy Interpersonal  Support and Feedback    Patient Response:   Patient responded to session by accepting feedback, giving feedback, listening, focusing on goals and accepting support  Possible barriers to participation / learning include: N/A    Current Mental Status Exam:   Appearance:  Appropriate   Eye Contact:  Good   Attitude / Demeanor: Cooperative   Speech      Rate / Production: Normal/ Responsive      Volume:  Normal  volume  Orientation:  All  Mood:   Normal  Affect:   Appropriate   Thought Content: Clear   Insight:   Good       Plan/Need for Future Services:  Return for therapy in 2 weeks to treat diagnosed problems.    Patient has a current master individualized treatment plan.  See Epic treatment plan for more information.    Referral / Collaboration:  Was/were discussed and patient will pursue. Psychiatry and PCP for discussion about antidepressant medication.  Emergency Services Needed?  No    Assignment:  Continue listing successes  Attend appt with PCP  Schedule with psychiatrist    Interactive Complexity:  There are four specific communication difficulties that complicate the work of the primary psychiatric procedure.  Interactive complexity (+63981) may be reported when at least one of these difficulties is present.    Communication difficulties present during current the psychiatric procedure include:  1. None.      Signature/Title:    Francisco Cannon, PhD, LP    [place] [facility], Sexual and Gender Health Clinic  Department of Family Medicine and Community Health  [facility]

## 2023-08-01 ASSESSMENT — ACTIVITIES OF DAILY LIVING (ADL): DEPENDENT_IADLS:: INDEPENDENT

## 2023-08-01 NOTE — PROGRESS NOTES
Clinic Care Coordination Contact  Clinic Care Coordination Contact  OUTREACH    Referral Information:  Referral Source:  (Therapist, Dr. Flores,)         Chief Complaint   Patient presents with    Clinic Care Coordination - Initial        Universal Utilization: Spoke with pt regarding need to have psychiatrist. This worker and pt completed screenings to complete pt enrollment.  Clinic Utilization  Difficulty keeping appointments:: No  Compliance Concerns: No  No-Show Concerns: No  No PCP office visit in Past Year: No  Utilization      Hospital Admissions  0             ED Visits  0             No Show Count (past year)  1                    Current as of: 7/31/2023  6:31 PM                Clinical Concerns:  Current Medical Concerns:  na    Current Behavioral Concerns: na    Education Provided to patient: psychiatric services available, substance use treatment options   Pain  Pain (GOAL):: No  Health Maintenance Reviewed: Up to date  Clinical Pathway: None    Medication Management:  Medication review status: Medications reviewed and no changes reported per patient.        Pt currently does not have medications     Functional Status:  Dependent ADLs:: Independent  Dependent IADLs:: Independent  Bed or wheelchair confined:: No  Mobility Status: Independent  Fallen 2 or more times in the past year?: No  Any fall with injury in the past year?: No    Living Situation:  Current living arrangement:: I live alone  Type of residence:: Apartment    Lifestyle & Psychosocial Needs:    Social Determinants of Health     Tobacco Use: Not on file   Alcohol Use: Not on file   Financial Resource Strain: Not on file   Food Insecurity: Not on file   Transportation Needs: Not on file   Physical Activity: Not on file   Stress: Not on file   Social Connections: Not on file   Intimate Partner Violence: Not on file   Depression: Not at risk (3/21/2023)    PHQ-2     PHQ-2 Score: 2   Housing Stability: Not on file     Diet:: Regular  Inadequate  nutrition (GOAL):: No  Tube Feeding: No  Inadequate activity/exercise (GOAL):: No  Significant changes in sleep pattern (GOAL): No  Transportation means:: Regular car     Cheondoism or spiritual beliefs that impact treatment:: No  Mental health DX:: Yes  Mental health DX how managed:: Outpatient Counseling  Mental health management concern (GOAL):: No  Chemical Dependency Status: Past Concern  Chemical Dependency Management: Previous treatment  Informal Support system:: Family, Friends, Other        pt reports understanding and denies any additional questions or concerns at this time. JANI CC engaged in AIDET communication during encounter.        Resources and Interventions:  Current Resources:      Community Resources: OP Mental Health,   Supplies Currently Used at Home: None  Equipment Currently Used at Home: none  Employment Status: employed full-time         Advance Care Plan/Directive  Advanced Care Plans/Directives on file:: No  Type Advanced Care Plans/Directives: Other  Advanced Care Plan/Directive Status: Declined Further Information    Referrals Placed: Mental Health    The patient consented via Verbal consent to have contact information and resources sent via email in an unencrypted manner.     Care Plan:  Care Plan: Mental Health       Problem: Establish Care with a Psychiatrist       Onset Date: 7/31/2023    Note:     Barriers: Wait lists for Psychiatric care  Strengths: Informed and Flexible  Patient expressed understanding of goal: yes  Action steps to achieve this goal:  1. I will follow up with potential psychiatric providers as suggested by the [facility] Clinic .  2. I will be placed on a wait list if needed for a potential provider.  3. I will coordinate and collaborate with  at the [facility] Clinic to navigate any challenges, should the arise.           Goal: Psychiatrist       Start Date: 7/31/2023    Note:     Barriers: Wait  lists for Psychiatric care  Strengths: Informed and Flexible  Patient expressed understanding of goal: yes  Action steps to achieve this goal:  1. I will follow up with potential psychiatric providers as suggested by the [facility] .  2. I will be placed on a wait list if needed for a potential provider.  3. I will coordinate and collaborate with  at the [facility] Clinic to navigate any challenges, should the arise.                               Patient/Caregiver understanding: yes    Outreach Frequency: monthly  Future Appointments                In 1 week Francisco Cannon, PhD [facility], [facility]    In 3 weeks Francisco Cannon, PhD [facility], [facility]    In 1 month Francisco Cannon, PhD [facility], [facility]            Plan: pt will follow up with psychiatric resources sent on 7/31/23 via email per pt request. Pt will will follow up with this worker as needed prior to next scheduled outreach.   This worker will monitor patient's chart until next scheduled outreach. This worker will outreach to the pt in 20-25 business days.      PUNEET Chatman  Social Work Care Coordinator  [facility]  [phone]  [email or web address]  Pronouns: They/He

## 2023-08-14 ENCOUNTER — VIRTUAL VISIT (OUTPATIENT)
Dept: PSYCHOLOGY | Facility: CLINIC | Age: 28
End: 2023-08-14
Payer: COMMERCIAL

## 2023-08-14 DIAGNOSIS — F15.20 SEVERE STIMULANT USE DISORDER (H): ICD-10-CM

## 2023-08-14 DIAGNOSIS — F32.A DEPRESSION, UNSPECIFIED DEPRESSION TYPE: ICD-10-CM

## 2023-08-14 DIAGNOSIS — F41.1 GENERALIZED ANXIETY DISORDER: Primary | ICD-10-CM

## 2023-08-14 PROCEDURE — 90837 PSYTX W PT 60 MINUTES: CPT | Mod: VID | Performed by: STUDENT IN AN ORGANIZED HEALTH CARE EDUCATION/TRAINING PROGRAM

## 2023-08-14 NOTE — PROGRESS NOTES
[place] [facility] Sexual [facility] - Progress Note    Date of Service: 23   Name: Sid Miranda  : 1995  Medical Record Number: [id number]  Treating Provider: Francisco Cannon, Ph.D. JENNIE  Type of Session: Individual  Present in Session: Client and therapist  Session Start and Stop Time: 5:01-5:55pm  Number of Minutes:  54    SERVICE MODALITY:  Video Visit:      Provider verified identity through the following two step process.  Patient provided:  Patient is known previously to provider and Patient was verified at admission/transfer    Telemedicine Visit: The patient's condition can be safely assessed and treated via synchronous audio and visual telemedicine encounter.      Reason for Telemedicine Visit: Services only offered telehealth    Originating Site (Patient Location): Patient's home    Distant Site (Provider Location): Provider Remote Setting- Home Office    Consent:  The patient/guardian has verbally consented to: the potential risks and benefits of telemedicine (video visit) versus in person care; bill my insurance or make self-payment for services provided; and responsibility for payment of non-covered services.     Patient would like the video invitation sent by:  My Chart    Mode of Communication:  Video Conference via [employer]    Distant Location (Provider):  On-site    As the provider I attest to compliance with applicable laws and regulations related to telemedicine.    DSM-5 Diagnoses:  Encounter Diagnoses   Name Primary?     Depression, unspecified depression type      Generalized anxiety disorder Yes     Severe stimulant use disorder (H)          Current Reported Symptoms and Status update:  Changes since last session include starting  Wellbutrin (150mg) for depression, experiencing some worry and anxiety, recent meth use (10 days ago).    Progress Toward Treatment Goals:   Satisfactory progress     Therapeutic Interventions/Treatment Strategies:    Area(s) of treatment focus  addressed in this session included Symptom Management and Sexual Health and Wellness    Client processed recent depression and anxiety. He noted depression is improved since starting Wellbutrin, but finds himself more anxious. He shared a recent meth use episode in which he noticed anxiety and thinking errors. We reviewed ways to increase coping, social support, and structure. Therapist engaged in motivational interviewing approaches to help client identify reasons to continue using and reasons to stop meth use. Client recognized a tendency to have anxiety and believe he cannot stop as a significant motivator. We discussed strategies to help him focus when anxious and having urges, including identifying 3 objectives per day, reaching out to social support when doing well, and creating weekend plans on Mondays.    Psychotherapist offered support, feedback and validation and reinforced use of skills Treatment modalities used include Cognitive Behavioral Therapy Interpersonal  Support and Feedback    Patient Response:   Patient responded to session by accepting feedback, giving feedback, listening, focusing on goals and accepting support  Possible barriers to participation / learning include: N/A    Current Mental Status Exam:   Appearance:  Appropriate   Eye Contact:  Good   Attitude / Demeanor: Interested  Speech      Rate / Production: Normal/ Responsive      Volume:  Normal  volume  Orientation:  All  Mood:   Normal  Affect:   Appropriate   Thought Content: Clear   Insight:   Good       Plan/Need for Future Services:  Return for therapy in 2 weeks to treat diagnosed problems.    Patient has a current master individualized treatment plan.  See Epic treatment plan for more information.    Referral / Collaboration:  Referral to another professional/service is not indicated at this time..  Emergency Services Needed?  No    Assignment:  Weekend planning  Social support  3 daily objectives    Interactive  Complexity:  There are four specific communication difficulties that complicate the work of the primary psychiatric procedure.  Interactive complexity (+01229) may be reported when at least one of these difficulties is present.    Communication difficulties present during current the psychiatric procedure include:  1. None.        Francisco Cannon, PhD, LP    [place] for Sexual and Gender Health, Sexual and Gender Health Clinic  Department of Family Medicine and Community Health  [facility]

## 2023-08-14 NOTE — NURSING NOTE
Is the patient currently in the state of [place]? YES    Visit mode:VIDEO    If the visit is dropped, the patient can be reconnected by: VIDEO VISIT:  Send e-mail to at [email or web address]    Will anyone else be joining the visit? No  (If patient encounters technical issues they should call [phone])    How would you like to obtain your AVS? MyChart    Are changes needed to the allergy or medication list? N/A    Rooming Documentation: Questionnaire(s) not done per department protocol.    Reason for visit: RECHECK     BIANKA Guerrero

## 2023-08-28 ENCOUNTER — VIRTUAL VISIT (OUTPATIENT)
Dept: PSYCHOLOGY | Facility: CLINIC | Age: 28
End: 2023-08-28
Payer: COMMERCIAL

## 2023-08-28 DIAGNOSIS — F32.A DEPRESSION, UNSPECIFIED DEPRESSION TYPE: ICD-10-CM

## 2023-08-28 DIAGNOSIS — F15.20 SEVERE STIMULANT USE DISORDER (H): ICD-10-CM

## 2023-08-28 DIAGNOSIS — F41.1 GENERALIZED ANXIETY DISORDER: Primary | ICD-10-CM

## 2023-08-28 PROCEDURE — 90837 PSYTX W PT 60 MINUTES: CPT | Mod: VID | Performed by: STUDENT IN AN ORGANIZED HEALTH CARE EDUCATION/TRAINING PROGRAM

## 2023-08-28 NOTE — PROGRESS NOTES
[facility] - Progress Note    Date of Service: 23   Name: Sid Miranda  : 1995  Medical Record Number: [id number]  Treating Provider:       Francisco Cannon, Ph.D  Type of Session: Individual  Present in Session: Client and therapist  Session Start and Stop Time: 5:00pm-6:00pm  Number of Minutes:  60    SERVICE MODALITY:  Video Visit:      Provider verified identity through the following two step process.  Patient provided:  Patient is known previously to provider and Patient was verified at admission/transfer    Telemedicine Visit: The patient's condition can be safely assessed and treated via synchronous audio and visual telemedicine encounter.      Reason for Telemedicine Visit: Patient has requested telehealth visit    Originating Site (Patient Location): Patient's home    Distant Site (Provider Location): [facility]    Consent:  The patient/guardian has verbally consented to: the potential risks and benefits of telemedicine (video visit) versus in person care; bill my insurance or make self-payment for services provided; and responsibility for payment of non-covered services.     Patient would like the video invitation sent by:  My Chart    Mode of Communication:  Video Conference via [employer]    Distant Location (Provider):  On-site    As the provider I attest to compliance with applicable laws and regulations related to telemedicine.    DSM-5 Diagnoses:  Encounter Diagnoses   Name Primary?     Generalized anxiety disorder Yes     Severe stimulant use disorder (H)      Depression, unspecified depression type          Current Reported Symptoms and Status update:  Changes since last session include 1 recent meth use, anxiety/worry in relationships, working to establish boundaries to     Progress Toward Treatment Goals:   Satisfactory progress     Therapeutic Interventions/Treatment Strategies:    Area(s) of treatment focus  addressed in this session included Symptom Management and Interpersonal Relationship Skills    Client processed anxiety/worry he experiences related to relationships and recovery. He reflected on complexity of emotions he experiences in relationships. We discussed ways in which he can experience emotions while also setting boundaries to help him feel more secure and reduce anxiety. He also discussed emotion regulation strategies and thoughts that are helpful to him. He plans to delete contacts related to his meth use from social media to reduce anxiety associated with relapse. Assigned client to start considering boundaries he could implement related to his meth use and relationships.    Psychotherapist offered support, feedback and validation and reinforced use of skills Treatment modalities used include Cognitive Behavioral Therapy Interpersonal  Support and Feedback    Patient Response:   Patient responded to session by accepting feedback, giving feedback, listening, focusing on goals and accepting support  Possible barriers to participation / learning include: N/A    Current Mental Status Exam:   Appearance:  Appropriate   Eye Contact:  Good   Attitude / Demeanor: Cooperative   Speech      Rate / Production: Normal/ Responsive      Volume:  Normal  volume  Orientation:  All  Mood:   Normal  Affect:   Appropriate   Thought Content: Clear   Insight:   Good       Plan/Need for Future Services:  Return for therapy in 2 weeks to treat diagnosed problems.    Patient has a current master individualized treatment plan.  See Epic treatment plan for more information.    Referral / Collaboration:  Referral to another professional/service is not indicated at this time..  Emergency Services Needed?  No    Assignment:  Boundaries    Interactive Complexity:  There are four specific communication difficulties that complicate the work of the primary psychiatric procedure.  Interactive complexity (+65158) may be reported when at  least one of these difficulties is present.    Communication difficulties present during current the psychiatric procedure include:  1. None.      Signature/Title:    Francisco Cannon, PhD, LP    [facility], [facility]  Department of Family Medicine and Community Health  [facility] [employer]

## 2023-08-28 NOTE — NURSING NOTE
Is the patient currently in the state of [place]? YES    Visit mode:VIDEO    If the visit is dropped, the patient can be reconnected by: VIDEO VISIT: Send to e-mail at: [email or web address]    Will anyone else be joining the visit? NO  (If patient encounters technical issues they should call [phone]    How would you like to obtain your AVS? MyChart    Are changes needed to the allergy or medication list? N/A    Reason for visit: SHERRY CARLTON

## 2023-08-30 ENCOUNTER — PATIENT OUTREACH (OUTPATIENT)
Dept: CARE COORDINATION | Facility: CLINIC | Age: 28
End: 2023-08-30
Payer: COMMERCIAL

## 2023-08-30 NOTE — PROGRESS NOTES
Clinic Care Coordination Contact  [facility]/Voicemail       Clinical Data: Care Coordinator Outreach  Outreach attempted x 1.  Unable to leave a voicemail with call back information.  Plan: Care Coordinator will send unable to contact letter with care coordinator contact information via Mail. Care Coordinator will try to reach patient again in 1-2 business days.    PUNEET Chatman  Social Work Care Coordinator  [facility]  [phone]  [email or web address]  Pronouns: They/He

## 2023-09-05 NOTE — PROGRESS NOTES
Clinic Care Coordination Contact  [facility]/Voicemail       Clinical Data: Care Coordinator Outreach  Outreach attempted x 2.  Left message on patient's voicemail with call back information and requested return call.  Plan: Care Coordinator will send unable to contact letter with care coordinator contact information via mail. Care Coordinator will try to reach patient again in 10-13 business days.    PUNEET Chatman  Social Work Care Coordinator  [facility]  [phone]  [email or web address]  Pronouns: They/He

## 2023-09-26 NOTE — PROGRESS NOTES
Clinic Care Coordination Contact  [facility]/Voicemail       Clinical Data: Care Coordinator Outreach  Outreach attempted x 3.  Unable to leave a voicemail with call back information.  Plan: Care Coordinator sent unable to contact letter via mail. Care Coordinator will do no further outreaches at this time.    PUNEET Chatman  Social Work Care Coordinator  [facility]  [phone]  [email or web address]  Pronouns: They/He

## 2023-10-04 ENCOUNTER — VIRTUAL VISIT (OUTPATIENT)
Dept: PSYCHOLOGY | Facility: CLINIC | Age: 28
End: 2023-10-04
Payer: COMMERCIAL

## 2023-10-04 DIAGNOSIS — F32.A DEPRESSION, UNSPECIFIED DEPRESSION TYPE: ICD-10-CM

## 2023-10-04 DIAGNOSIS — F15.20 SEVERE STIMULANT USE DISORDER (H): Primary | ICD-10-CM

## 2023-10-04 DIAGNOSIS — F41.1 GENERALIZED ANXIETY DISORDER: ICD-10-CM

## 2023-10-04 PROCEDURE — 90837 PSYTX W PT 60 MINUTES: CPT | Mod: VID | Performed by: STUDENT IN AN ORGANIZED HEALTH CARE EDUCATION/TRAINING PROGRAM

## 2023-10-04 NOTE — PROGRESS NOTES
[facility] - Progress Note    Date of Service: 10/04/23   Name: Sid Miranda  : 1995  Medical Record Number: [id number]  Treating Provider: Francisco Cannon, Ph.D,    Type of Session: Individual  Present in Session: Client and therapist  Session Start and Stop Time: 3:00pm-3:53pm  Number of Minutes:  53    SERVICE MODALITY:  Video Visit:      Provider verified identity through the following two step process.  Patient provided:  Patient is known previously to provider and Patient was verified at admission/transfer    Telemedicine Visit: The patient's condition can be safely assessed and treated via synchronous audio and visual telemedicine encounter.      Reason for Telemedicine Visit: Services only offered telehealth    Originating Site (Patient Location): Patient's home    Distant Site (Provider Location): Provider Remote Setting- Home Office    Consent:  The patient/guardian has verbally consented to: the potential risks and benefits of telemedicine (video visit) versus in person care; bill my insurance or make self-payment for services provided; and responsibility for payment of non-covered services.     Patient would like the video invitation sent by:  My Chart    Mode of Communication:  Video Conference via [employer]    Distant Location (Provider):  Off-site    As the provider I attest to compliance with applicable laws and regulations related to telemedicine.    DSM-5 Diagnoses:  Encounter Diagnoses   Name Primary?     Generalized anxiety disorder      Depression, unspecified depression type      Severe stimulant use disorder (H) Yes         Current Reported Symptoms and Status update:  Changes since last session include recent meth use (1x 6 days ago, 1x 22 days ago); anxiety/worry related to relationships and wellbeing; increased fatigue, some depression, some boredom, and general feelings of dissatisfaction.    Generalized Anxiety Disorder: Excessive anxiety and worry  about a number of events or activities (such as work or school performance); difficulty controlling worry; restlessness or feeling keyed up or on edge; difficulty concentrating or mind going blank; irritability.      Unspecified Depressive Disorder: Client reports symptoms of depression that do not meet full criteria for diagnosis of MDD. Sometimes include hopelessness, depressed mood, irritability, and anhedonia.      Severe Stimulant Use Disorder: Client has a history of methamphetamine use that significantly negatively impacts functioning, including financial stress, loss of employment, and loss of relationships. He continued to use despite negative consequences. Client has a history of sexual behavior he describes as compulsive when using meth. Further assessment needed to determine if compulsive sexual behaviors also occur separately from meth use.       Progress Toward Treatment Goals:   Satisfactory progress      Diagnostic assessment 3/21/23  Tx plan 4/18/23    Therapeutic Interventions/Treatment Strategies:    Area(s) of treatment focus addressed in this session included Symptom Management, Interpersonal Relationship Skills and Sexual Health and Wellness    Client processed recent meth use episodes and sexual experiences. He also reflected on how this interacts with mental health - particularly anxiety and depression. He noted overall reduction in use (frequency and quantity). Client shared anxiety about his current partner being released from care home. He is engaging social support and coping strategies to manage anxiety, depression, and meth use. He continues to engage with his sponsor and attend his 12-step group. We discussed ways in which client could feel more balanced and proactively address anxiety and depression. We also discussed his meth-linked sexual behavior and motivations for use. Client identified core beliefs contributing to meth use, depression, and anxiety, including inadequacy and  hopelessness. We also challenged client's positive expectancies of use and identified actual outcomes.    Psychotherapist offered support, feedback and validation and reinforced use of skills Treatment modalities used include Cognitive Behavioral Therapy Interpersonal  Support and Feedback    Patient Response:   Patient responded to session by accepting feedback, giving feedback, listening, focusing on goals and accepting support  Possible barriers to participation / learning include: N/A    Current Mental Status Exam:   Appearance:  Appropriate   Eye Contact:  Good   Attitude / Demeanor: Cooperative   Speech      Rate / Production: Normal/ Responsive      Volume:  Normal  volume  Orientation:  All  Mood:   Normal  Affect:   Appropriate   Thought Content: Clear   Insight:   Good       Plan/Need for Future Services:  Return for therapy in 2 weeks to treat diagnosed problems.    Patient has a current master individualized treatment plan.  See Epic treatment plan for more information.    Referral / Collaboration:  Was/were discussed and patient will pursue - substance use treatment.  Emergency Services Needed?  No    Assignment:  Write down/challenge expectations for use    Interactive Complexity:  There are four specific communication difficulties that complicate the work of the primary psychiatric procedure.  Interactive complexity (+11209) may be reported when at least one of these difficulties is present.    Communication difficulties present during current the psychiatric procedure include:  1. None.      Signature/Title:      Francisco Cannon, PhD, LP    [facility], [facility]  Department of Family Medicine and Community Health  [employer] [facility] [employer]

## 2023-10-04 NOTE — NURSING NOTE
Is the patient currently in the state of [place]? YES    Visit mode:VIDEO    If the visit is dropped, the patient can be reconnected by: VIDEO VISIT:  Send e-mail to at [email or web address]    Will anyone else be joining the visit? No  (If patient encounters technical issues they should call [phone])    How would you like to obtain your AVS? MyChart    Are changes needed to the allergy or medication list? N/A    Rooming Documentation: Questionnaire(s) not done per department protocol.    Reason for visit: BIANKA Kiser

## 2023-10-09 ENCOUNTER — VIRTUAL VISIT (OUTPATIENT)
Dept: PSYCHOLOGY | Facility: CLINIC | Age: 28
End: 2023-10-09
Payer: COMMERCIAL

## 2023-10-09 DIAGNOSIS — F15.20 SEVERE STIMULANT USE DISORDER (H): ICD-10-CM

## 2023-10-09 DIAGNOSIS — F41.1 GENERALIZED ANXIETY DISORDER: Primary | ICD-10-CM

## 2023-10-09 DIAGNOSIS — F32.A DEPRESSION, UNSPECIFIED DEPRESSION TYPE: ICD-10-CM

## 2023-10-09 PROCEDURE — 90837 PSYTX W PT 60 MINUTES: CPT | Mod: VID | Performed by: STUDENT IN AN ORGANIZED HEALTH CARE EDUCATION/TRAINING PROGRAM

## 2023-10-09 NOTE — NURSING NOTE
Is the patient currently in the state of [place]? YES    Visit mode:VIDEO    If the visit is dropped, the patient can be reconnected by: VIDEO VISIT: Send to e-mail at: [email or web address]    Will anyone else be joining the visit? NO  (If patient encounters technical issues they should call [phone]    How would you like to obtain your AVS? MyChart    Are changes needed to the allergy or medication list? N/A    Reason for visit: SHERRY CARLTON

## 2023-10-09 NOTE — PROGRESS NOTES
[facility] - Progress Note    Date of Service: 10/09/23   Name: Sid Miranda  : 1995  Medical Record Number: [id number]  Treating Provider: Francisco Cannon, PhD  Type of Session: Individual  Present in Session: Client and therapist  Session Start and Stop Time: 5:00pm-5:53pm  Number of Minutes:  53    SERVICE MODALITY:  Video Visit:      Provider verified identity through the following two step process.  Patient provided:  Patient is known previously to provider and Patient was verified at admission/transfer    Telemedicine Visit: The patient's condition can be safely assessed and treated via synchronous audio and visual telemedicine encounter.      Reason for Telemedicine Visit: Services only offered telehealth    Originating Site (Patient Location): Patient's home    Distant Site (Provider Location): Provider Remote Setting- Home Office    Consent:  The patient/guardian has verbally consented to: the potential risks and benefits of telemedicine (video visit) versus in person care; bill my insurance or make self-payment for services provided; and responsibility for payment of non-covered services.     Patient would like the video invitation sent by:  My Chart    Mode of Communication:  Video Conference via [employer]    Distant Location (Provider):  Off-site    As the provider I attest to compliance with applicable laws and regulations related to telemedicine.    DSM-5 Diagnoses:  Encounter Diagnoses   Name Primary?     Generalized anxiety disorder Yes     Depression, unspecified depression type      Severe stimulant use disorder (H)          Current Reported Symptoms and Status update:  Changes since last session include some anxiety/worry about relationships, recovery, and work; depression improved; recent meth use episode.    Generalized Anxiety Disorder: Excessive anxiety and worry about a number of events or activities (such as work or school performance); difficulty  controlling worry; restlessness or feeling keyed up or on edge; difficulty concentrating or mind going blank; irritability.      Unspecified Depressive Disorder: Client reports symptoms of depression that do not meet full criteria for diagnosis of MDD. Sometimes include hopelessness, depressed mood, irritability, and anhedonia.      Severe Stimulant Use Disorder: Client has a history of methamphetamine use that significantly negatively impacts functioning, including financial stress, loss of employment, and loss of relationships. He continued to use despite negative consequences. Client has a history of sexual behavior he describes as compulsive when using meth. Further assessment needed to determine if compulsive sexual behaviors also occur separately from meth use.       Progress Toward Treatment Goals:   Satisfactory progress     Generalized Anxiety Disorder: Excessive anxiety and worry about a number of events or activities (such as work or school performance); difficulty controlling worry; restlessness or feeling keyed up or on edge; difficulty concentrating or mind going blank; irritability.      Unspecified Depressive Disorder: Client reports symptoms of depression that do not meet full criteria for diagnosis of MDD. Sometimes include hopelessness, depressed mood, irritability, and anhedonia.      Severe Stimulant Use Disorder: Client has a history of methamphetamine use that significantly negatively impacts functioning, including financial stress, loss of employment, and loss of relationships. He continued to use despite negative consequences. Client has a history of sexual behavior he describes as compulsive when using meth. Further assessment needed to determine if compulsive sexual behaviors also occur separately from meth use.       Therapeutic Interventions/Treatment Strategies:    Area(s) of treatment focus addressed in this session included Symptom Management    Client processed anxiety, worry, and  stress. He was offered a new position and is building a routine/structure to support mental health and recovery. He is continuing to attend frequent 12-step meetings and engage in his recovery plan. Client reported 1 recent use episode. Therapist and client processed client's anxieties and ways to address negative internal dialogue. Therapist supported client to challenge narratives that he is unstable and identify ways in which he is creating/fostering stability. We also discussed impacts of relationships on well-being. Client hopes to expand social support.     Psychotherapist offered support, feedback and validation and reinforced use of skills Treatment modalities used include Cognitive Behavioral Therapy Interpersonal  Support and Feedback    Patient Response:   Patient responded to session by accepting feedback, giving feedback, listening, focusing on goals and accepting support  Possible barriers to participation / learning include: N/A    Current Mental Status Exam:   Appearance:  Appropriate   Eye Contact:  Good   Attitude / Demeanor: Cooperative   Speech      Rate / Production: Normal/ Responsive      Volume:  Normal  volume  Orientation:  All  Mood:   Normal  Affect:   Appropriate   Thought Content: Clear   Insight:   Good       Plan/Need for Future Services:  Return for therapy in 2 weeks to treat diagnosed problems.    Patient has a current master individualized treatment plan.  See Epic treatment plan for more information.    Referral / Collaboration:  Referral to another professional/service is not indicated at this time..  Emergency Services Needed?  No    Assignment:  None assigned    Interactive Complexity:  There are four specific communication difficulties that complicate the work of the primary psychiatric procedure.  Interactive complexity (+05189) may be reported when at least one of these difficulties is present.    Communication difficulties present during current the psychiatric procedure  include:  1. None.      Signature/Title:    Francisco Cannon, PhD, LP    [facility], [facility] Health [facility]  Department of Family Medicine and Community Health  [facility]

## 2023-10-23 ENCOUNTER — VIRTUAL VISIT (OUTPATIENT)
Dept: PSYCHOLOGY | Facility: CLINIC | Age: 28
End: 2023-10-23
Payer: COMMERCIAL

## 2023-10-23 DIAGNOSIS — F15.20 SEVERE STIMULANT USE DISORDER (H): ICD-10-CM

## 2023-10-23 DIAGNOSIS — F32.A DEPRESSION, UNSPECIFIED DEPRESSION TYPE: ICD-10-CM

## 2023-10-23 DIAGNOSIS — F41.1 GENERALIZED ANXIETY DISORDER: Primary | ICD-10-CM

## 2023-10-23 PROCEDURE — 90837 PSYTX W PT 60 MINUTES: CPT | Mod: VID | Performed by: STUDENT IN AN ORGANIZED HEALTH CARE EDUCATION/TRAINING PROGRAM

## 2023-10-23 NOTE — PROGRESS NOTES
[place] [facility] - Progress Note    Date of Service: 10/23/23   Name: Sid Miranda  : 1995  Medical Record Number: [id number]  Treating Provider: Francisco Cannon  Type of Session: Individual  Present in Session: Client and therapist  Session Start and Stop Time: 4:00pm-4:53pm  Number of Minutes:  53    SERVICE MODALITY:  Video Visit:      Provider verified identity through the following two step process.  Patient provided:  Patient is known previously to provider and Patient was verified at admission/transfer    Telemedicine Visit: The patient's condition can be safely assessed and treated via synchronous audio and visual telemedicine encounter.      Reason for Telemedicine Visit: Services only offered telehealth    Originating Site (Patient Location): Patient's home    Distant Site (Provider Location): Provider Remote Setting- Home Office    Consent:  The patient/guardian has verbally consented to: the potential risks and benefits of telemedicine (video visit) versus in person care; bill my insurance or make self-payment for services provided; and responsibility for payment of non-covered services.     Patient would like the video invitation sent by:  My Chart    Mode of Communication:  Video Conference via Amwell    Distant Location (Provider):  Off-site    As the provider I attest to compliance with applicable laws and regulations related to telemedicine.    DSM-5 Diagnoses:  Encounter Diagnoses   Name Primary?     Generalized anxiety disorder Yes     Depression, unspecified depression type      Severe stimulant use disorder (H)          Current Reported Symptoms and Status update:  Changes since last session include no recent meth use, anxiety, worry, feelings of insecurity, negative self-talk, poor sense of self.    Generalized Anxiety Disorder: Excessive anxiety and worry about a number of events or activities (such as work or school performance); difficulty controlling worry;  restlessness or feeling keyed up or on edge; difficulty concentrating or mind going blank; irritability.      Unspecified Depressive Disorder: Client reports symptoms of depression that do not meet full criteria for diagnosis of MDD. Sometimes include hopelessness, depressed mood, irritability, and anhedonia.      Severe Stimulant Use Disorder: Client has a history of methamphetamine use that significantly negatively impacts functioning, including financial stress, loss of employment, and loss of relationships. He continued to use despite negative consequences. Client has a history of sexual behavior he describes as compulsive when using meth. Further assessment needed to determine if compulsive sexual behaviors also occur separately from meth use.       Progress Toward Treatment Goals:   Satisfactory progress     Therapeutic Interventions/Treatment Strategies:    Area(s) of treatment focus addressed in this session included Symptom Management and Sexual Health and Wellness    Client shared that he has started his new job and is enjoying more time with his partner, Tip. He processed anxiety about his relationship and sexual behavior. He reflected on his sense of self worth and body image, and ways core beliefs contribute to anxious thinking and worry. We discussed how this could be affecting sexual functioning. Client and therapist worked to identify more constructive and self-compassionate self-talk. During session, client experienced negative emotions and tearfulness. Therapist support client to experience and express emotions.    Psychotherapist offered support, feedback and validation and reinforced use of skills Treatment modalities used include Cognitive Behavioral Therapy  Support and Feedback    Patient Response:   Patient responded to session by accepting feedback, giving feedback, listening, focusing on goals and accepting support  Possible barriers to participation / learning include: N/A    Current  Mental Status Exam:   Appearance:  Appropriate   Eye Contact:  Good   Attitude / Demeanor: Cooperative   Speech      Rate / Production: Normal/ Responsive      Volume:  Normal  volume  Orientation:  All  Mood:   Normal  Affect:   Appropriate   Thought Content: Clear   Insight:   Good       Plan/Need for Future Services:  Return for therapy in 2 weeks to treat diagnosed problems.    Patient has a current master individualized treatment plan.  See Epic treatment plan for more information.    Referral / Collaboration:  Referral to another professional/service is not indicated at this time..  Emergency Services Needed?  No    Assignment:  Continue compassionate self-talk    Interactive Complexity:  There are four specific communication difficulties that complicate the work of the primary psychiatric procedure.  Interactive complexity (+35848) may be reported when at least one of these difficulties is present.    Communication difficulties present during current the psychiatric procedure include:  1. None.      Signature/Title:      Francisco Cannon, PhD, LP    [place] [facility], [facility]  Department of Family Medicine and Community Health  [employer] [facility] [employer]

## 2023-10-23 NOTE — NURSING NOTE
Is the patient currently in the state of [place]? YES    Visit mode:VIDEO    If the visit is dropped, the patient can be reconnected by: VIDEO VISIT: Text to cell phone:   Telephone Information:   Mobile [phone]       Will anyone else be joining the visit? NO  (If patient encounters technical issues they should call [phone]    How would you like to obtain your AVS? Mail a copy    Are changes needed to the allergy or medication list? N/A    Reason for visit: RECHFARA CARLTON

## 2023-10-30 ENCOUNTER — VIRTUAL VISIT (OUTPATIENT)
Dept: PSYCHOLOGY | Facility: CLINIC | Age: 28
End: 2023-10-30
Payer: COMMERCIAL

## 2023-10-30 DIAGNOSIS — F41.1 GENERALIZED ANXIETY DISORDER: Primary | ICD-10-CM

## 2023-10-30 DIAGNOSIS — F32.A DEPRESSION, UNSPECIFIED DEPRESSION TYPE: ICD-10-CM

## 2023-10-30 DIAGNOSIS — F15.20 SEVERE STIMULANT USE DISORDER (H): ICD-10-CM

## 2023-10-30 PROCEDURE — 90837 PSYTX W PT 60 MINUTES: CPT | Mod: VID | Performed by: STUDENT IN AN ORGANIZED HEALTH CARE EDUCATION/TRAINING PROGRAM

## 2023-10-30 NOTE — NURSING NOTE
Is the patient currently in the state of [place]? YES    Visit mode:VIDEO    If the visit is dropped, the patient can be reconnected by: VIDEO VISIT: Send to e-mail at: [email or web address]    Will anyone else be joining the visit? NO  (If patient encounters technical issues they should call [phone])    How would you like to obtain your AVS? MyChart    Are changes needed to the allergy or medication list? N/A    Reason for visit: SHERRY CARLTON

## 2023-10-30 NOTE — PROGRESS NOTES
[place] [facility] - Progress Note    Date of Service: 10/30/23   Name: Sid Miranda  : 1995  Medical Record Number: [id number]  Treating Provider: Francisco Cannon, Ph.D,   Type of Session: Individual  Present in Session: Client and therapist  Session Start and Stop Time: 5:02pm-5:55pm  Number of Minutes:  53    SERVICE MODALITY:  Video Visit:      Provider verified identity through the following two step process.  Patient provided:  Patient is known previously to provider and Patient was verified at admission/transfer    Telemedicine Visit: The patient's condition can be safely assessed and treated via synchronous audio and visual telemedicine encounter.      Reason for Telemedicine Visit: Patient has requested telehealth visit    Originating Site (Patient Location): Patient's home    Distant Site (Provider Location): [facility]    Consent:  The patient/guardian has verbally consented to: the potential risks and benefits of telemedicine (video visit) versus in person care; bill my insurance or make self-payment for services provided; and responsibility for payment of non-covered services.     Patient would like the video invitation sent by:  My Chart    Mode of Communication:  Video Conference via 1EQ    Distant Location (Provider):  On-site    As the provider I attest to compliance with applicable laws and regulations related to telemedicine.    DSM-5 Diagnoses:  Encounter Diagnoses   Name Primary?     Generalized anxiety disorder Yes     Depression, unspecified depression type      Severe stimulant use disorder (H)          Current Reported Symptoms and Status update:  Changes since last session include financial stress, some worry and anxiety, adjusting to a new sleep schedule with work, decreased mood and productivity related to sleep changes. No recent meth use.     Generalized Anxiety Disorder: Excessive anxiety and worry about a  "number of events or activities (such as work or school performance); difficulty controlling worry; restlessness or feeling keyed up or on edge; difficulty concentrating or mind going blank; irritability.      Unspecified Depressive Disorder: Client reports symptoms of depression that do not meet full criteria for diagnosis of MDD. Sometimes include hopelessness, depressed mood, irritability, and anhedonia.      Severe Stimulant Use Disorder: Client has a history of methamphetamine use that significantly negatively impacts functioning, including financial stress, loss of employment, and loss of relationships. He continued to use despite negative consequences. Client has a history of sexual behavior he describes as compulsive when using meth. Further assessment needed to determine if compulsive sexual behaviors also occur separately from meth use.       Progress Toward Treatment Goals:   Satisfactory progress     Therapeutic Interventions/Treatment Strategies:    Area(s) of treatment focus addressed in this session included Symptom Management and Sexual Health and Wellness    Client reflected on recent anxiety/worry, particularly related to his relationship and his recovery. Client noted progress he is making in mindfulness/meditation, catching negative thinking patterns, and changing metacognition. Client shared how he is practicing this during sex to curb sex-related anxiety. He is working on body image as an activator for meth-related cravings/urges. He is also engaging social support and yoga for coping. He reported he has not used meth for a month. We reviewed strengths and supports in his recovery. Client discussed ways in which he is looking into adding meaning and volunteering. We discussed \"paired associations\" between stimuli and his meth use, and ways in which he can continue to support his recovery.     Psychotherapist offered support, feedback and validation and reinforced use of skills Treatment " modalities used include Cognitive Behavioral Therapy Interpersonal  Support and Feedback    Patient Response:   Patient responded to session by accepting feedback, giving feedback, listening, focusing on goals and accepting support  Possible barriers to participation / learning include: N/A    Current Mental Status Exam:   Appearance:  Appropriate   Eye Contact:  Good   Attitude / Demeanor: Cooperative   Speech      Rate / Production: Normal/ Responsive      Volume:  Normal  volume  Orientation:  All  Mood:   Normal  Affect:   Appropriate   Thought Content: Clear   Insight:   Good       Plan/Need for Future Services:  Return for therapy in 2 weeks to treat diagnosed problems.    Patient has a current master individualized treatment plan.  See Epic treatment plan for more information.    Referral / Collaboration:  Referral to another professional/service is not indicated at this time..  Emergency Services Needed?  No    Assignment:  Continue self-care and mindfulness    Interactive Complexity:  There are four specific communication difficulties that complicate the work of the primary psychiatric procedure.  Interactive complexity (+72570) may be reported when at least one of these difficulties is present.    Communication difficulties present during current the psychiatric procedure include:  1. None.      Signature/Title:    Francisco Cannon, PhD, LP    [place] [facility], [facility]  Department of Family Medicine and Community Health  [employer] [facility] [employer]

## 2023-11-20 ENCOUNTER — VIRTUAL VISIT (OUTPATIENT)
Dept: PSYCHOLOGY | Facility: CLINIC | Age: 28
End: 2023-11-20
Payer: COMMERCIAL

## 2023-11-20 DIAGNOSIS — F15.20 SEVERE STIMULANT USE DISORDER (H): ICD-10-CM

## 2023-11-20 DIAGNOSIS — F41.1 GENERALIZED ANXIETY DISORDER: ICD-10-CM

## 2023-11-20 DIAGNOSIS — F32.A DEPRESSION, UNSPECIFIED DEPRESSION TYPE: Primary | ICD-10-CM

## 2023-11-20 PROCEDURE — 90837 PSYTX W PT 60 MINUTES: CPT | Mod: VID | Performed by: STUDENT IN AN ORGANIZED HEALTH CARE EDUCATION/TRAINING PROGRAM

## 2023-11-20 NOTE — PROGRESS NOTES
[place] - Progress Note    Date of Service: 23   Name: Sid Miranda  : 1995  Medical Record Number: [id number]  Treating Provider: Francisco Cannon, Ph.D,   Type of Session: Individual  Present in Session: Client and therapist  Session Start and Stop Time: 1:00pm-1:55pm  Number of Minutes:  55    SERVICE MODALITY:  Video Visit:      Provider verified identity through the following two step process.  Patient provided:  Patient is known previously to provider and Patient was verified at admission/transfer    Telemedicine Visit: The patient's condition can be safely assessed and treated via synchronous audio and visual telemedicine encounter.      Reason for Telemedicine Visit: Services only offered telehealth    Originating Site (Patient Location): Patient's home    Distant Site (Provider Location): [facility]    Consent:  The patient/guardian has verbally consented to: the potential risks and benefits of telemedicine (video visit) versus in person care; bill my insurance or make self-payment for services provided; and responsibility for payment of non-covered services.     Patient would like the video invitation sent by:  My Chart    Mode of Communication:  Video Conference via AmBreezy    Distant Location (Provider):  On-site    As the provider I attest to compliance with applicable laws and regulations related to telemedicine.    DSM-5 Diagnoses:  Encounter Diagnoses   Name Primary?     Depression, unspecified depression type Yes     Generalized anxiety disorder      Severe stimulant use disorder (H)          Current Reported Symptoms and Status update:  Changes since last session include improvement in anxiety and depression, continuing work, sobriety from meth 2 months.    Generalized Anxiety Disorder: Excessive anxiety and worry about a number of events or activities (such as work or school performance); difficulty  controlling worry; restlessness or feeling keyed up or on edge; difficulty concentrating or mind going blank; irritability.      Unspecified Depressive Disorder: Client reports symptoms of depression that do not meet full criteria for diagnosis of MDD. Sometimes include hopelessness, depressed mood, irritability, and anhedonia.      Severe Stimulant Use Disorder: Client has a history of methamphetamine use that significantly negatively impacts functioning, including financial stress, loss of employment, and loss of relationships. He continued to use despite negative consequences. Client has a history of sexual behavior he describes as compulsive when using meth. Further assessment needed to determine if compulsive sexual behaviors also occur separately from meth use.       Progress Toward Treatment Goals:   Satisfactory progress     Diagnostic assessment 3/21/23  Tx plan 4/18/23    Therapeutic Interventions/Treatment Strategies:    Area(s) of treatment focus addressed in this session included Symptom Management, Interpersonal Relationship Skills and Sexual Health and Wellness    Client processed recent work encounters that caused him some anxiety and values conflicts. He shared that he is 2 months sober from meth but finding himself wanting new experiences/novelty/risk. Assigned client to identify ways in which he could meet those needs. He identified ways in which he has urges to use, and how he engages supports to keep him sober. We reviewed client's treatment plan (see below).    Psychotherapist offered support, feedback and validation and reinforced use of skills Treatment modalities used include Cognitive Behavioral Therapy Interpersonal  Support and Feedback    Patient Response:   Patient responded to session by accepting feedback, giving feedback, listening, focusing on goals and accepting support  Possible barriers to participation / learning include: N/A    Current Mental Status Exam:    Appearance:  Appropriate   Eye Contact:  Good   Attitude / Demeanor: Cooperative   Speech      Rate / Production: Normal/ Responsive      Volume:  Normal  volume  Orientation:  All  Mood:   Normal  Affect:   Appropriate   Thought Content: Clear   Insight:   Good       Plan/Need for Future Services:  Return for therapy in 2 weeks to treat diagnosed problems.    Patient has a current master individualized treatment plan.  See Epic treatment plan for more information.    Referral / Collaboration:  Referral to another professional/service is not indicated at this time..  Emergency Services Needed?  No    Assignment:  List experiences that might address sensation seeking    Interactive Complexity:  There are four specific communication difficulties that complicate the work of the primary psychiatric procedure.  Interactive complexity (+71932) may be reported when at least one of these difficulties is present.    Communication difficulties present during current the psychiatric procedure include:  1. None.      [place] [facility]:  Individualized Treatment Plan      Date of Plan: 23               Name: Sid Miranda                                                         MRN: [id number]  : 1995     Date of Creation: 23  Last reviewed: 23    Psychiatric Diagnosis:    Encounter Diagnoses   Name Primary?     Depression, unspecified depression type Yes     Generalized anxiety disorder      Severe stimulant use disorder (H)         Provisional Diagnostic Hypothesis R/O Other specified depressive, impulse control, and conduct disorder (hypersexuality). Determine if symptoms meet dx threshold and exist separately from meth use. Further assessment needed for pt's report of a PTSD dx.  Further assessment needed for pt's report of Borderline Personality Disorder.     Psychosocial / Contextual Factors: Unemployment, queer identity, spirituality, recovery from meth  PROMIS (reviewed every  "90 days). See chart  Referral / Collaboration: Not at this time  Anticipated number of session for this episode of care: 12  Anticipation frequency of session: biweekly  Anticipated Duration of each session: 60 mins  Treatment plan will be reviewed in 180 days or when goals have been changed.        Impact of Symptoms on Function:  Decreased Physical/Health Status  Decreased Social/Family Function  Decreased Work Function     Sexual Problems:  None reported    Gender Concerns:  None reported     Client Strengths:  Patient identified the following strengths or resources that will help them succeed in treatment: kindness, earnesstness, and intellect.      Client Participation in Plan:  Contributed to goals and plan   Agrees with plan      Areas of Vulnerability:  Things that may interfere with the patient's success in treatment include: Tendency to insulate himself from failure and shame, poor confidence.      Long-Term Goals:  \"Long-term sobriety, address compulsive sexual behavior that happens even into sobriety, and find a way through the shame and guilt that I have about sex and childhood stuff. Trying to neutralize those memories and incorporate them into my understanding of myself as an adult. Learn how to make sex a pleasurable part of my life and not as something I resent.\"     Discharge Criteria:  Improvement re: identified problems and symptoms       Areas of Treatment Focus      Why are you seeking treatment/What do you want to focus on during treatment? \"sexual compulsivity, childhood sexual trauma, and sexual reticence in sobriety\" \"I also want to work on self-esteem and body image issues\"          Area of Treatment Focus:  Address associated mental and substance use issues  Start Date:    11/20/23    Goal:                               Target Date: 5/20/24                                          Status: Active  Stabilize mood, anxiety, and chemical health issues.   Coordinate care if medication is " needed.  Discuss chemical use patterns and their effects on compulsive sexual behavior.  Prioritize pt's participation in substance use treatment.    Progress:  Satisfacotry. Client has reduced substance use and mood        Intervention(s):  Therapist will provide psychoeducation on associations between CSB and mental health and substance use.  Therapist will refer out and coordinate services as-needed.         Area of Treatment Focus:  Develop healthy coping skills  Start Date:   11/20/23    Goal:                                Target Date: 5/20/24                                       Status: Active  Develop healthy coping skills to support sexual, mental, and chemical health.    Progress:   Started       Intervention(s):  Therapist will teach coping strategies and distress tolerance skills as needed.  Therapist will practice coping skills with client.  Therapist will discuss ways to implement coping skills into daily life.  Therapist will help client plan for using coping skills.  Therapist will teach mindfulness strategies to client.         Area of Treatment Focus:  Identify and implement preliminary boundaries  Start Date:   11/20/23    Goal:                                Target Date: 5/20/24                                     Status: Active  Establish boundaries that align client s sexual behavior with their values.    Progress:       Started   Intervention(s):  Therapist will provide handouts related to establishing boundaries for healthy sexual behavior.  Therapist will support client to identify personal boundaries to align sexual behavior with values.               Signature/Title:       Francisco Cannon, PhD, LP    [place] [facility], [facility] and [facility] Health [facility]  Department of Family Medicine and Community Health  [employer] [facility] [employer]

## 2023-11-20 NOTE — NURSING NOTE
Is the patient currently in the state of [place]? YES    Visit mode:VIDEO    If the visit is dropped, the patient can be reconnected by: VIDEO VISIT: Send to e-mail at: [email or web address]    Will anyone else be joining the visit? NO  (If patient encounters technical issues they should call [phone]    How would you like to obtain your AVS? MyChart    Are changes needed to the allergy or medication list? No    Reason for visit: RECHECK    Dhruv CARLTON

## 2023-11-28 ENCOUNTER — VIRTUAL VISIT (OUTPATIENT)
Dept: PSYCHOLOGY | Facility: CLINIC | Age: 28
End: 2023-11-28
Payer: COMMERCIAL

## 2023-11-28 DIAGNOSIS — F41.1 GENERALIZED ANXIETY DISORDER: ICD-10-CM

## 2023-11-28 DIAGNOSIS — F32.A DEPRESSION, UNSPECIFIED DEPRESSION TYPE: ICD-10-CM

## 2023-11-28 DIAGNOSIS — F15.20 SEVERE STIMULANT USE DISORDER (H): Primary | ICD-10-CM

## 2023-11-28 PROCEDURE — 90837 PSYTX W PT 60 MINUTES: CPT | Mod: VID | Performed by: STUDENT IN AN ORGANIZED HEALTH CARE EDUCATION/TRAINING PROGRAM

## 2023-11-28 NOTE — PROGRESS NOTES
[facility] - Progress Note    Date of Service: 23   Name: Sdi Miranda  : 1995  Medical Record Number: [id number]  Treating Provider: Francisco Cannon, Ph.D,   Type of Session: Individual  Present in Session: Client and therapist  Session Start and Stop Time: 11:00am-11:55am  Number of Minutes:  55    SERVICE MODALITY:  Video Visit:      Provider verified identity through the following two step process.  Patient provided:  Patient is known previously to provider and Patient was verified at admission/transfer    Telemedicine Visit: The patient's condition can be safely assessed and treated via synchronous audio and visual telemedicine encounter.      Reason for Telemedicine Visit: Patient has requested telehealth visit    Originating Site (Patient Location): Patient's home    Distant Site (Provider Location): [facility]    Consent:  The patient/guardian has verbally consented to: the potential risks and benefits of telemedicine (video visit) versus in person care; bill my insurance or make self-payment for services provided; and responsibility for payment of non-covered services.     Patient would like the video invitation sent by:  My Chart    Mode of Communication:  Video Conference via AmParature    Distant Location (Provider):  On-site    As the provider I attest to compliance with applicable laws and regulations related to telemedicine.    DSM-5 Diagnoses:  Encounter Diagnoses   Name Primary?     Severe stimulant use disorder (H) Yes     Depression, unspecified depression type      Generalized anxiety disorder          Current Reported Symptoms and Status update:  Changes since last session include recent meth use episode, difficulty coping with body image, recovering from collapsed lung, depressed mood, increased anxiety.    Generalized Anxiety Disorder: Excessive anxiety and worry about a number of events or activities (such as  work or school performance); difficulty controlling worry; restlessness or feeling keyed up or on edge; difficulty concentrating or mind going blank; irritability.      Unspecified Depressive Disorder: Client reports symptoms of depression that do not meet full criteria for diagnosis of MDD. Sometimes include hopelessness, depressed mood, irritability, and anhedonia.      Severe Stimulant Use Disorder: Client has a history of methamphetamine use that significantly negatively impacts functioning, including financial stress, loss of employment, and loss of relationships. He continued to use despite negative consequences. Client has a history of sexual behavior he describes as compulsive when using meth. Further assessment needed to determine if compulsive sexual behaviors also occur separately from meth use.       Progress Toward Treatment Goals:   Satisfactory progress     Therapeutic Interventions/Treatment Strategies:    Area(s) of treatment focus addressed in this session included Symptom Management, Interpersonal Relationship Skills and Sexual Health and Wellness    Client processed a recent medical condition leading to him needing to take 2 weeks off work and exercise. He shared how increased anxiety and restlessness led to him using meth this Sunday. We identified factors contributing to use, including social, cognitive, and emotional. We also discussed ways of addressing these factors effectively. Client shared body image concerns and how they interact with depression, anxiety and meth use. We planned to continue work on body image as part of his recovery and healthy sexuality.    Psychotherapist offered support, feedback and validation and reinforced use of skills Treatment modalities used include Cognitive Behavioral Therapy  Support and Feedback    Patient Response:   Patient responded to session by accepting feedback, giving feedback, listening, focusing on goals and accepting support  Possible barriers to  participation / learning include: N/A    Current Mental Status Exam:   Appearance:  Appropriate   Eye Contact:  Good   Attitude / Demeanor: Cooperative   Speech      Rate / Production: Normal/ Responsive      Volume:  Normal  volume  Orientation:  All  Mood:   Normal  Affect:   Appropriate   Thought Content: Clear   Insight:   Good       Plan/Need for Future Services:  Return for therapy in 2 weeks to treat diagnosed problems.    Patient has a current master individualized treatment plan.  See Epic treatment plan for more information.    Referral / Collaboration:  Referral to another professional/service is not indicated at this time..  Emergency Services Needed?  No    Assignment:  Coping, self-care, harm reduction, engage social supports    Interactive Complexity:  There are four specific communication difficulties that complicate the work of the primary psychiatric procedure.  Interactive complexity (+75723) may be reported when at least one of these difficulties is present.    Communication difficulties present during current the psychiatric procedure include:  1. None.      Signature/Title:      Francisco Cannon, PhD, LP    [facility] [facility], [facility]  Department of Family Medicine and Community Health  [facility] [employer]

## 2023-11-28 NOTE — NURSING NOTE
Is the patient currently in the state of [place]? YES    Visit mode:VIDEO    If the visit is dropped, the patient can be reconnected by: VIDEO VISIT: Send to e-mail at: [email or web address]    Will anyone else be joining the visit? NO  (If patient encounters technical issues they should call [phone]    How would you like to obtain your AVS? MyChart    Are changes needed to the allergy or medication list? N/A    Reason for visit: SHERRY CARLTON

## 2023-12-04 ENCOUNTER — VIRTUAL VISIT (OUTPATIENT)
Dept: PSYCHOLOGY | Facility: CLINIC | Age: 28
End: 2023-12-04
Payer: COMMERCIAL

## 2023-12-04 DIAGNOSIS — F15.20 SEVERE STIMULANT USE DISORDER (H): ICD-10-CM

## 2023-12-04 DIAGNOSIS — F32.A DEPRESSION, UNSPECIFIED DEPRESSION TYPE: ICD-10-CM

## 2023-12-04 DIAGNOSIS — F41.1 GENERALIZED ANXIETY DISORDER: Primary | ICD-10-CM

## 2023-12-04 PROCEDURE — 90834 PSYTX W PT 45 MINUTES: CPT | Mod: VID | Performed by: STUDENT IN AN ORGANIZED HEALTH CARE EDUCATION/TRAINING PROGRAM

## 2023-12-04 NOTE — NURSING NOTE
Is the patient currently in the state of [place]? YES    Visit mode:VIDEO    If the visit is dropped, the patient can be reconnected by: VIDEO VISIT: Send to e-mail at: [email or web address]    Will anyone else be joining the visit? NO  (If patient encounters technical issues they should call [phone]    How would you like to obtain your AVS? MyChart    Are changes needed to the allergy or medication list? N/A    Reason for visit: SHERRY CARLTON

## 2023-12-04 NOTE — PROGRESS NOTES
[place] [facility] - Progress Note    Date of Service: 23   Name: Sid Miranda  : 1995  Medical Record Number: [id number]  Treating Provider: Francisco Cannon, Ph.D,   Type of Session: Individual  Present in Session: Client and therapist  Session Start and Stop Time: 1:05pm-1:55pm  Number of Minutes:  50    SERVICE MODALITY:  Video Visit:      Provider verified identity through the following two step process.  Patient provided:  Patient is known previously to provider and Patient was verified at admission/transfer    Telemedicine Visit: The patient's condition can be safely assessed and treated via synchronous audio and visual telemedicine encounter.      Reason for Telemedicine Visit: Patient has requested telehealth visit    Originating Site (Patient Location): Patient's home    Distant Site (Provider Location): [facility]    Consent:  The patient/guardian has verbally consented to: the potential risks and benefits of telemedicine (video visit) versus in person care; bill my insurance or make self-payment for services provided; and responsibility for payment of non-covered services.     Patient would like the video invitation sent by:  My Chart    Mode of Communication:  Video Conference via CVRx    Distant Location (Provider):  On-site    As the provider I attest to compliance with applicable laws and regulations related to telemedicine.    DSM-5 Diagnoses:  Encounter Diagnoses   Name Primary?     Generalized anxiety disorder Yes     Depression, unspecified depression type      Severe stimulant use disorder (H)          Current Reported Symptoms and Status update:  Changes since last session include increased anxiety secondary to physical restrictions (no yoga, etc), depressed mood, no meth use since previous session.    Generalized Anxiety Disorder: Excessive anxiety and worry about a number of events or activities (such as work or  "school performance); difficulty controlling worry; restlessness or feeling keyed up or on edge; difficulty concentrating or mind going blank; irritability.      Unspecified Depressive Disorder: Client reports symptoms of depression that do not meet full criteria for diagnosis of MDD. Sometimes include hopelessness, depressed mood, irritability, and anhedonia.      Severe Stimulant Use Disorder: Client has a history of methamphetamine use that significantly negatively impacts functioning, including financial stress, loss of employment, and loss of relationships. He continued to use despite negative consequences. Client has a history of sexual behavior he describes as compulsive when using meth. Further assessment needed to determine if compulsive sexual behaviors also occur separately from meth use.       Progress Toward Treatment Goals:   Satisfactory progress     Therapeutic Interventions/Treatment Strategies:    Area(s) of treatment focus addressed in this session included Symptom Management, Interpersonal Relationship Skills and Sexual Health and Wellness    Client processed difficulties coping with anxiety and depression due to physical restrictions from his collapsed lung. He reflected on ways he has been coping and engaging social support. We discussed his return to work and his worry about \"over-doing\" his schedule and physical activity. We also identified and challenged negative thinking patterns about his body and self-image and how they contribute to anxiety. We identified ways of challenging negative thinking related to his physical abilities. Client also discussed his partner moving in with him and ways in which this presents logistical challenges as well as happiness.     Psychotherapist offered support, feedback and validation and reinforced use of skills Treatment modalities used include Cognitive Behavioral Therapy Interpersonal  Support and Feedback    Patient Response:   Patient responded to " session by accepting feedback, giving feedback, listening, focusing on goals and accepting support  Possible barriers to participation / learning include: N/A    Current Mental Status Exam:   Appearance:  Appropriate   Eye Contact:  Good   Attitude / Demeanor: Cooperative   Speech      Rate / Production: Normal/ Responsive      Volume:  Normal  volume  Orientation:  All  Mood:   Normal  Affect:   Appropriate   Thought Content: Clear   Insight:   Good       Plan/Need for Future Services:  Return for therapy in 2 weeks to treat diagnosed problems.    Patient has a current master individualized treatment plan.  See Epic treatment plan for more information.    Referral / Collaboration:  Referral to another professional/service is not indicated at this time..  Emergency Services Needed?  No    Assignment:  Self-care/coping    Interactive Complexity:  There are four specific communication difficulties that complicate the work of the primary psychiatric procedure.  Interactive complexity (+10276) may be reported when at least one of these difficulties is present.    Communication difficulties present during current the psychiatric procedure include:  1. None.      Signature/Title:    Francisco Cannon, PhD, LP    [place] [facility], [facility]  Department of Family Medicine and Community Health  [employer] [facility] [employer]

## 2023-12-19 ENCOUNTER — VIRTUAL VISIT (OUTPATIENT)
Dept: PSYCHOLOGY | Facility: CLINIC | Age: 28
End: 2023-12-19
Payer: COMMERCIAL

## 2023-12-19 DIAGNOSIS — F41.1 GENERALIZED ANXIETY DISORDER: Primary | ICD-10-CM

## 2023-12-19 DIAGNOSIS — F32.A DEPRESSION, UNSPECIFIED DEPRESSION TYPE: ICD-10-CM

## 2023-12-19 DIAGNOSIS — F15.20 SEVERE STIMULANT USE DISORDER (H): ICD-10-CM

## 2023-12-19 PROCEDURE — 90837 PSYTX W PT 60 MINUTES: CPT | Mod: VID | Performed by: STUDENT IN AN ORGANIZED HEALTH CARE EDUCATION/TRAINING PROGRAM

## 2023-12-19 NOTE — PROGRESS NOTES
[facility] - Progress Note    Date of Service: 23   Name: Sid Miranda  : 1995  Medical Record Number: [id number]  Treating Provider: Francisco Cannon, Ph.D,    Type of Session: Individual  Present in Session: Client and therapist  Session Start and Stop Time: 1:05pm-2:00pm  Number of Minutes:  55    SERVICE MODALITY:  Video Visit:      Provider verified identity through the following two step process.  Patient provided:  Patient is known previously to provider and Patient was verified at admission/transfer    Telemedicine Visit: The patient's condition can be safely assessed and treated via synchronous audio and visual telemedicine encounter.      Reason for Telemedicine Visit: Patient has requested telehealth visit    Originating Site (Patient Location): Patient's home    Distant Site (Provider Location): [facility]    Consent:  The patient/guardian has verbally consented to: the potential risks and benefits of telemedicine (video visit) versus in person care; bill my insurance or make self-payment for services provided; and responsibility for payment of non-covered services.     Patient would like the video invitation sent by:  Other e-mail: see chart    Mode of Communication:  Video Conference via Amwell    Distant Location (Provider):  On-site    As the provider I attest to compliance with applicable laws and regulations related to telemedicine.    DSM-5 Diagnoses:  Encounter Diagnoses   Name Primary?     Generalized anxiety disorder Yes     Depression, unspecified depression type      Severe stimulant use disorder (H)          Current Reported Symptoms and Status update:  Changes since last session include anxiety/worry, restlessness, feeling burnt out from work, some depression and low motivation,     Generalized Anxiety Disorder: Excessive anxiety and worry about a number of events or activities (such as work or school  performance); difficulty controlling worry; restlessness or feeling keyed up or on edge; difficulty concentrating or mind going blank; irritability.      Unspecified Depressive Disorder: Client reports symptoms of depression that do not meet full criteria for diagnosis of MDD. Sometimes include hopelessness, depressed mood, irritability, and anhedonia.      Severe Stimulant Use Disorder: Client has a history of methamphetamine use that significantly negatively impacts functioning, including financial stress, loss of employment, and loss of relationships. He continued to use despite negative consequences. Client has a history of sexual behavior he describes as compulsive when using meth. Further assessment needed to determine if compulsive sexual behaviors also occur separately from meth use.       Progress Toward Treatment Goals:   Satisfactory progress     Therapeutic Interventions/Treatment Strategies:    Area(s) of treatment focus addressed in this session included Symptom Management, Interpersonal Relationship Skills and Sexual Health and Wellness    Client discussed current stressors with work, feeling ill, and his schedule. He also processed how holidays can be overstimulating and affect his mood. Client further processed body image concerns and self-concept. He discussed his tendency to compare himself to others. We started identifying how/when his body image concerns originated. He connected body image to depression and substance use. Client also talked through how body image interacts with sexual and relationship functioning. We discussed starting to explore history/antecedents to self-criticism and negative body image.    Psychotherapist offered support, feedback and validation and reinforced use of skills Treatment modalities used include Cognitive Behavioral Therapy  Support and Feedback    Patient Response:   Patient responded to session by accepting feedback, giving feedback, listening, focusing on  goals and accepting support  Possible barriers to participation / learning include: N/A    Current Mental Status Exam:   Appearance:  Appropriate   Eye Contact:  Good   Attitude / Demeanor: Cooperative   Speech      Rate / Production: Normal/ Responsive      Volume:  Normal  volume  Orientation:  All  Mood:   Normal  Affect:   Appropriate   Thought Content: Clear   Insight:   Good       Plan/Need for Future Services:  Return for therapy in 2 weeks to treat diagnosed problems.    Patient has a current master individualized treatment plan.  See Epic treatment plan for more information.    Referral / Collaboration:  Referral to another professional/service is not indicated at this time..  Emergency Services Needed?  No    Assignment:  History/antecedents at next follow-up    Interactive Complexity:  There are four specific communication difficulties that complicate the work of the primary psychiatric procedure.  Interactive complexity (+22446) may be reported when at least one of these difficulties is present.    Communication difficulties present during current the psychiatric procedure include:  1. None.      Signature/Title:      Francisco Cannon, PhD, LP    [place] [facility], [facility]  Department of Family Medicine and Community Health  [employer] [facility] [employer]

## 2024-01-02 ENCOUNTER — VIRTUAL VISIT (OUTPATIENT)
Dept: PSYCHOLOGY | Facility: CLINIC | Age: 29
End: 2024-01-02
Payer: COMMERCIAL

## 2024-01-02 DIAGNOSIS — F32.A DEPRESSION, UNSPECIFIED DEPRESSION TYPE: ICD-10-CM

## 2024-01-02 DIAGNOSIS — F41.1 GENERALIZED ANXIETY DISORDER: Primary | ICD-10-CM

## 2024-01-02 DIAGNOSIS — F15.20 SEVERE STIMULANT USE DISORDER (H): ICD-10-CM

## 2024-01-02 PROCEDURE — 90837 PSYTX W PT 60 MINUTES: CPT | Mod: 95 | Performed by: STUDENT IN AN ORGANIZED HEALTH CARE EDUCATION/TRAINING PROGRAM

## 2024-01-02 NOTE — PROGRESS NOTES
[place] [facility] - Progress Note    Date of Service: 24   Name: Sid Miranda  : 1995  Medical Record Number: [id number]  Treating Provider: Francisco Cannon, Ph.D,   Type of Session: Individual  Present in Session: Client and therapist  Session Start and Stop Time: 1:00pm-1:53pm  Number of Minutes:  53    SERVICE MODALITY:  Video Visit:      Provider verified identity through the following two step process.  Patient provided:  Patient is known previously to provider and Patient was verified at admission/transfer    Telemedicine Visit: The patient's condition can be safely assessed and treated via synchronous audio and visual telemedicine encounter.      Reason for Telemedicine Visit: Patient has requested telehealth visit    Originating Site (Patient Location): Patient's home    Distant Site (Provider Location): [facility]    Consent:  The patient/guardian has verbally consented to: the potential risks and benefits of telemedicine (video visit) versus in person care; bill my insurance or make self-payment for services provided; and responsibility for payment of non-covered services.     Patient would like the video invitation sent by:  My Chart    Mode of Communication:  Video Conference via InterValve    Distant Location (Provider):  On-site    As the provider I attest to compliance with applicable laws and regulations related to telemedicine.    DSM-5 Diagnoses:  Encounter Diagnoses   Name Primary?     Generalized anxiety disorder Yes     Depression, unspecified depression type      Severe stimulant use disorder (H)          Current Reported Symptoms and Status update:  Changes since last session include anxiety and worry, some depression, working on effective coping and support, maintaining his job, and recent brief meth use episode.    Generalized Anxiety Disorder: Excessive anxiety and worry about a number of events or activities (such  as work or school performance); difficulty controlling worry; restlessness or feeling keyed up or on edge; difficulty concentrating or mind going blank; irritability.      Unspecified Depressive Disorder: Client reports symptoms of depression that do not meet full criteria for diagnosis of MDD. Sometimes include hopelessness, depressed mood, irritability, and anhedonia.      Severe Stimulant Use Disorder: Client has a history of methamphetamine use that significantly negatively impacts functioning, including financial stress, loss of employment, and loss of relationships. He continued to use despite negative consequences. Client has a history of sexual behavior he describes as compulsive when using meth. Further assessment needed to determine if compulsive sexual behaviors also occur separately from meth use.       Progress Toward Treatment Goals:   Satisfactory progress     Therapeutic Interventions/Treatment Strategies:    Area(s) of treatment focus addressed in this session included Symptom Management, Clark Maintenance/Relapse Prevention and Interpersonal Relationship Skills    Client processed a recent use episode, and his reactions to his partner's later use episode. He discussed feelings of anxiety, guilt, and distrust. We discussed client's fears related to relapse, and his anxiety in relationships. He connected this to body shame and elevated paranoia when using meth. Client reflected on ways in which he has all-or-nothing thinking regarding trust versus anxiety in relationships. He processed feelings of anxiety attachment.    Psychotherapist offered support, feedback and validation and provided redirection Treatment modalities used include Cognitive Behavioral Therapy  Support and Feedback    Patient Response:   Patient responded to session by accepting feedback, giving feedback, listening, focusing on goals and accepting support  Possible barriers to participation / learning include: N/A    Current  "Mental Status Exam:   Appearance:  Appropriate   Eye Contact:  Good   Attitude / Demeanor: Cooperative   Speech      Rate / Production: Normal/ Responsive      Volume:  Normal  volume  Orientation:  All  Mood:   Normal  Affect:   Appropriate   Thought Content: Clear   Insight:   Good       Plan/Need for Future Services:  Return for therapy in 2 weeks to treat diagnosed problems.    Patient has a current master individualized treatment plan.  See Epic treatment plan for more information.    Referral / Collaboration:  Referral to another professional/service is not indicated at this time..  Emergency Services Needed?  No    Assignment:  Consider reading the book \"Attached'    Interactive Complexity:  There are four specific communication difficulties that complicate the work of the primary psychiatric procedure.  Interactive complexity (+39843) may be reported when at least one of these difficulties is present.    Communication difficulties present during current the psychiatric procedure include:  1. None.      Francisco Cannon, PhD, LP    [place] [facility], [facility] and [facility] Health Clinic  Department of Family Medicine and Community Health  [employer] [facility] [employer] [facility] [employer]        "

## 2024-01-02 NOTE — NURSING NOTE
Is the patient currently in the state of [place]? YES    Visit mode:VIDEO    If the visit is dropped, the patient can be reconnected by: VIDEO VISIT: Text to cell phone:   Telephone Information:   Mobile [phone]       Will anyone else be joining the visit? NO  (If patient encounters technical issues they should call [phone]    How would you like to obtain your AVS? MyChart    Are changes needed to the allergy or medication list? No    Reason for visit: RECHECK    Mildred CARLTON

## 2024-01-16 ENCOUNTER — VIRTUAL VISIT (OUTPATIENT)
Dept: PSYCHOLOGY | Facility: CLINIC | Age: 29
End: 2024-01-16
Payer: COMMERCIAL

## 2024-01-16 DIAGNOSIS — F41.1 GENERALIZED ANXIETY DISORDER: Primary | ICD-10-CM

## 2024-01-16 DIAGNOSIS — F15.20 SEVERE STIMULANT USE DISORDER (H): ICD-10-CM

## 2024-01-16 DIAGNOSIS — F32.A DEPRESSION, UNSPECIFIED DEPRESSION TYPE: ICD-10-CM

## 2024-01-16 PROCEDURE — 90837 PSYTX W PT 60 MINUTES: CPT | Mod: 95 | Performed by: STUDENT IN AN ORGANIZED HEALTH CARE EDUCATION/TRAINING PROGRAM

## 2024-01-16 NOTE — NURSING NOTE
Is the patient currently in the state of [place]? YES    Visit mode:VIDEO    If the visit is dropped, the patient can be reconnected by: VIDEO VISIT:  Send e-mail to at [email or web address]    Will anyone else be joining the visit? No  (If patient encounters technical issues they should call [phone])    How would you like to obtain your AVS? MyChart    Are changes needed to the allergy or medication list? N/A    Rooming Documentation: Questionnaire(s) not done per department protocol.    Reason for visit: RECHECK     Su Sanders, HANANEF

## 2024-01-16 NOTE — PROGRESS NOTES
[place] [facility] - Progress Note    Date of Service: 24   Name: Sid Miranda  : 1995  Medical Record Number: [id number]  Treating Provider: Francisco Cannon, Ph.D,   Type of Session: Individual  Present in Session: Client and therapist  Session Start and Stop Time: 1:00pm-1:55pm  Number of Minutes:  55    SERVICE MODALITY:  Video Visit:      Provider verified identity through the following two step process.  Patient provided:  Patient is known previously to provider and Patient was verified at admission/transfer    Telemedicine Visit: The patient's condition can be safely assessed and treated via synchronous audio and visual telemedicine encounter.      Reason for Telemedicine Visit: Services only offered telehealth    Originating Site (Patient Location): Patient's home    Distant Site (Provider Location): Provider Remote Setting- Home Office    Consent:  The patient/guardian has verbally consented to: the potential risks and benefits of telemedicine (video visit) versus in person care; bill my insurance or make self-payment for services provided; and responsibility for payment of non-covered services.     Patient would like the video invitation sent by:  My Chart    Mode of Communication:  Video Conference via Amwell    Distant Location (Provider):  On-site    As the provider I attest to compliance with applicable laws and regulations related to telemedicine.    DSM-5 Diagnoses:  Encounter Diagnoses   Name Primary?     Generalized anxiety disorder Yes     Depression, unspecified depression type      Severe stimulant use disorder (H)          Current Reported Symptoms and Status update:  Changes since last session include increased anxiety/worry, depression reasonably managed with treatment, continuing to work on recovery from methamphetamine.    Generalized Anxiety Disorder: Excessive anxiety and worry about a number of events or activities (such as work or school performance);  difficulty controlling worry; restlessness or feeling keyed up or on edge; difficulty concentrating or mind going blank; irritability.      Unspecified Depressive Disorder: Client reports symptoms of depression that do not meet full criteria for diagnosis of MDD. Sometimes include hopelessness, depressed mood, irritability, and anhedonia.      Severe Stimulant Use Disorder: Client has a history of methamphetamine use that significantly negatively impacts functioning, including financial stress, loss of employment, and loss of relationships. He continued to use despite negative consequences. Client has a history of sexual behavior he describes as compulsive when using meth. Further assessment needed to determine if compulsive sexual behaviors also occur separately from meth use.       Progress Toward Treatment Goals:   Satisfactory progress     Therapeutic Interventions/Treatment Strategies:    Area(s) of treatment focus addressed in this session included Symptom Management, Interpersonal Relationship Skills and Sexual Health and Wellness    Client processed concerns around trust, anxiety, and worry in his relationship. He reflected on patterns of attachment related anxiety and how it is showing up in his current relationship. We discussed client's needs for security and affirmation. We connected concerns to core beliefs. Client and therapist discussed ways having having needs for affirmation met. We also intervened on client's negative emotional reactions to his emotions, leading to shame spirals. Client plans to  a copy of the book Attached from therapist's office later this week.    Psychotherapist offered support, feedback and validation and reinforced use of skills Treatment modalities used include Cognitive Behavioral Therapy  Support and Feedback    Patient Response:   Patient responded to session by accepting feedback, giving feedback, listening, focusing on goals and accepting support  Possible  "barriers to participation / learning include: N/A    Current Mental Status Exam:   Appearance:  Appropriate   Eye Contact:  Good   Attitude / Demeanor: Cooperative   Speech      Rate / Production: Normal/ Responsive      Volume:  Normal  volume  Orientation:  All  Mood:   Normal  Affect:   Appropriate   Thought Content: Clear   Insight:   Good       Plan/Need for Future Services:  Return for therapy in 2 weeks to treat diagnosed problems.    Patient has a current master individualized treatment plan.  See Epic treatment plan for more information.    Referral / Collaboration:  Referral to another professional/service is not indicated at this time..  Emergency Services Needed?  No    Assignment:  Book \"Attached\"    Interactive Complexity:  There are four specific communication difficulties that complicate the work of the primary psychiatric procedure.  Interactive complexity (+94701) may be reported when at least one of these difficulties is present.    Communication difficulties present during current the psychiatric procedure include:  1. None.      Francisco Cannon, PhD, LP    [place] [facility], [facility]  Department of Family Medicine and Community Health  [employer] [facility] [employer]      "

## 2024-01-30 ENCOUNTER — VIRTUAL VISIT (OUTPATIENT)
Dept: PSYCHOLOGY | Facility: CLINIC | Age: 29
End: 2024-01-30
Payer: COMMERCIAL

## 2024-01-30 DIAGNOSIS — F41.1 GENERALIZED ANXIETY DISORDER: Primary | ICD-10-CM

## 2024-01-30 DIAGNOSIS — F32.A DEPRESSION, UNSPECIFIED DEPRESSION TYPE: ICD-10-CM

## 2024-01-30 DIAGNOSIS — F15.20 SEVERE STIMULANT USE DISORDER (H): ICD-10-CM

## 2024-01-30 PROCEDURE — 90837 PSYTX W PT 60 MINUTES: CPT | Mod: 95 | Performed by: STUDENT IN AN ORGANIZED HEALTH CARE EDUCATION/TRAINING PROGRAM

## 2024-01-30 NOTE — PROGRESS NOTES
[place] [facility] - Progress Note    Date of Service: 24   Name: Sid Miranda  : 1995  Medical Record Number: [id number]  Treating Provider: Francisco Cannon, Ph.D,   Type of Session: Individual  Present in Session: Client and therapist  Session Start and Stop Time: 11:01am-12:00pm  Number of Minutes:  59    SERVICE MODALITY:  Video Visit:      Provider verified identity through the following two step process.  Patient provided:  Patient is known previously to provider and Patient was verified at admission/transfer    Telemedicine Visit: The patient's condition can be safely assessed and treated via synchronous audio and visual telemedicine encounter.      Reason for Telemedicine Visit: Patient has requested telehealth visit    Originating Site (Patient Location): Patient's home    Distant Site (Provider Location): [facility]    Consent:  The patient/guardian has verbally consented to: the potential risks and benefits of telemedicine (video visit) versus in person care; bill my insurance or make self-payment for services provided; and responsibility for payment of non-covered services.     Patient would like the video invitation sent by:  My Chart    Mode of Communication:  Video Conference via VANDOLAY    Distant Location (Provider):  On-site    As the provider I attest to compliance with applicable laws and regulations related to telemedicine.    DSM-5 Diagnoses:  Encounter Diagnoses   Name Primary?     Depression, unspecified depression type      Generalized anxiety disorder Yes     Severe stimulant use disorder (H)          Current Reported Symptoms and Status update:  Changes since last session includes increased depression and anxiety; relationship problems; sobriety from meth since last follow-up.    Generalized Anxiety Disorder: Excessive anxiety and worry about a number of events or activities (such as work or school performance);  difficulty controlling worry; restlessness or feeling keyed up or on edge; difficulty concentrating or mind going blank; irritability.      Unspecified Depressive Disorder: Client reports symptoms of depression that do not meet full criteria for diagnosis of MDD. Sometimes include hopelessness, depressed mood, irritability, and anhedonia.      Severe Stimulant Use Disorder: Client has a history of methamphetamine use that significantly negatively impacts functioning, including financial stress, loss of employment, and loss of relationships. He continued to use despite negative consequences. Client has a history of sexual behavior he describes as compulsive when using meth. Further assessment needed to determine if compulsive sexual behaviors also occur separately from meth use.       Progress Toward Treatment Goals:   Satisfactory progress     Therapeutic Interventions/Treatment Strategies:    Area(s) of treatment focus addressed in this session included Symptom Management and Interpersonal Relationship Skills    Client processed anxiety (over-thinking, feeling on edge, worried) related to his relationship. He discussed recent conflicts and feeling disconnected from his partner. He also expressed how relationship conflict is affecting overall anxiety. Therapist provided validation and support. Therapist supported client to identify relationship needs and ways of communicating those needs. We also identified thinking patterns related to conflict and anxiety (e.g., mind-reading partner's thoughts and feelings). Client expressed feeling better and finding session helpful. Therapist provided psychoeducation about written exposure therapy for trauma-related symptoms. We planned to assess PTSD and severity at next follow-up.     Psychotherapist offered support, feedback and validation and reinforced use of skills Treatment modalities used include Cognitive Behavioral Therapy  Support and Feedback    Patient Response:    Patient responded to session by accepting feedback, giving feedback, listening, focusing on goals and accepting support  Possible barriers to participation / learning include: N/A    Current Mental Status Exam:   Appearance:  Appropriate   Eye Contact:  Good   Attitude / Demeanor: Cooperative   Speech      Rate / Production: Normal/ Responsive      Volume:  Normal  volume  Orientation:  All  Mood:   Normal  Affect:   Appropriate   Thought Content: Clear   Insight:   Good       Plan/Need for Future Services:  Return for therapy in 2 weeks to treat diagnosed problems.    Patient has a current master individualized treatment plan.  See Epic treatment plan for more information.    Referral / Collaboration:  Referral to another professional/service is not indicated at this time..  Emergency Services Needed?  No    Assignment:  Coping and self-care    Interactive Complexity:  There are four specific communication difficulties that complicate the work of the primary psychiatric procedure.  Interactive complexity (+59937) may be reported when at least one of these difficulties is present.    Communication difficulties present during current the psychiatric procedure include:  1. None.      Signature/Title:    Francisco Cannon, PhD, LP    [place] [facility], [facility]  Department of Family Medicine and Community Health  [facility] [employer]

## 2024-01-30 NOTE — NURSING NOTE
Is the patient currently in the state of [place]? YES    Visit mode:VIDEO    If the visit is dropped, the patient can be reconnected by: VIDEO VISIT: Send to e-mail at: [email or web address]    Will anyone else be joining the visit? NO  (If patient encounters technical issues they should call [phone]    How would you like to obtain your AVS? MyChart    Are changes needed to the allergy or medication list? No    Reason for visit: RECHECK    Dhruv CARLTON

## 2024-02-06 ENCOUNTER — VIRTUAL VISIT (OUTPATIENT)
Dept: PSYCHOLOGY | Facility: CLINIC | Age: 29
End: 2024-02-06
Payer: COMMERCIAL

## 2024-02-06 DIAGNOSIS — F15.20 SEVERE STIMULANT USE DISORDER (H): ICD-10-CM

## 2024-02-06 DIAGNOSIS — F41.1 GENERALIZED ANXIETY DISORDER: ICD-10-CM

## 2024-02-06 DIAGNOSIS — F32.A DEPRESSION, UNSPECIFIED DEPRESSION TYPE: Primary | ICD-10-CM

## 2024-02-06 PROCEDURE — 90837 PSYTX W PT 60 MINUTES: CPT | Mod: 95 | Performed by: STUDENT IN AN ORGANIZED HEALTH CARE EDUCATION/TRAINING PROGRAM

## 2024-02-06 NOTE — PROGRESS NOTES
[place] [facility] - Progress Note    Date of Service: 24   Name: Sid Miranda  : 1995  Medical Record Number: [id number]  Treating Provider: Francisco Cannon, Ph.D,   Type of Session: Individual  Present in Session: Client and therapist  Session Start and Stop Time: 11:01am-11:55am  Number of Minutes:  54    SERVICE MODALITY:  Video Visit:      Provider verified identity through the following two step process.  Patient provided:  Patient is known previously to provider and Patient was verified at admission/transfer    Telemedicine Visit: The patient's condition can be safely assessed and treated via synchronous audio and visual telemedicine encounter.      Reason for Telemedicine Visit: Patient has requested telehealth visit    Originating Site (Patient Location): Patient's home    Distant Site (Provider Location): [facility]    Consent:  The patient/guardian has verbally consented to: the potential risks and benefits of telemedicine (video visit) versus in person care; bill my insurance or make self-payment for services provided; and responsibility for payment of non-covered services.     Patient would like the video invitation sent by:  My Chart    Mode of Communication:  Video Conference via CAIS    Distant Location (Provider):  On-site    As the provider I attest to compliance with applicable laws and regulations related to telemedicine.    DSM-5 Diagnoses:  Encounter Diagnoses   Name Primary?     Depression, unspecified depression type Yes     Generalized anxiety disorder      Severe stimulant use disorder (H)          Current Reported Symptoms and Status update:  Changes since last session include improvement in depressive symptoms, improvement in anxiety although still some worry/rumination about relationships and finances, recent methamphetamine use (3 weeks ago).    Generalized Anxiety Disorder: Excessive anxiety and worry  about a number of events or activities (such as work or school performance); difficulty controlling worry; restlessness or feeling keyed up or on edge; difficulty concentrating or mind going blank; irritability.      Unspecified Depressive Disorder: Client reports symptoms of depression that do not meet full criteria for diagnosis of MDD. Sometimes include hopelessness, depressed mood, irritability, and anhedonia.      Severe Stimulant Use Disorder: Client has a history of methamphetamine use that significantly negatively impacts functioning, including financial stress, loss of employment, and loss of relationships. He continued to use despite negative consequences. Client has a history of sexual behavior he describes as compulsive when using meth. Further assessment needed to determine if compulsive sexual behaviors also occur separately from meth use.       Progress Toward Treatment Goals:   Satisfactory progress     Therapeutic Interventions/Treatment Strategies:    Area(s) of treatment focus addressed in this session included Symptom Management and Interpersonal Relationship Skills    Client shared that he and his partner have a new partner. He also shared ways in which he is improving connection and communication with his partner. Client shared a recent methamphetamine use episode 3 weeks ago. Therapist and client reviewed client's support and meth treatment resources. Therapist assigned client to identify and connect with a sponsor from his 12-step program. We reviewed plans for written exposure therapy and therapist's concerns about starting trauma work without effective supports for meth. We planned to delay for a week to allow client to establish supports.     Psychotherapist offered support, feedback and validation and reinforced use of skills Treatment modalities used include Cognitive Behavioral Therapy Interpersonal  Support and Feedback    Patient Response:   Patient responded to session by accepting  feedback, giving feedback, listening, focusing on goals and accepting support  Possible barriers to participation / learning include: N/A    Current Mental Status Exam:   Appearance:  Appropriate   Eye Contact:  Good   Attitude / Demeanor: Cooperative   Speech      Rate / Production: Normal/ Responsive      Volume:  Normal  volume  Orientation:  All  Mood:   Normal  Affect:   Appropriate   Thought Content: Clear   Insight:   Good       Plan/Need for Future Services:  Return for therapy in 2 weeks to treat diagnosed problems.    Patient has a current master individualized treatment plan.  See Epic treatment plan for more information.    Referral / Collaboration:  Referral to another professional/service is not indicated at this time..  Emergency Services Needed?  No    Assignment:  Establish support with sponsor    Interactive Complexity:  There are four specific communication difficulties that complicate the work of the primary psychiatric procedure.  Interactive complexity (+00608) may be reported when at least one of these difficulties is present.    Communication difficulties present during current the psychiatric procedure include:  1. None.      Signature/Title:      Francisco Cannon, PhD, LP    [place] [facility], [facility] and [facility]  Department of Family Medicine and Community Health  [employer] [facility] [employer]

## 2024-02-06 NOTE — NURSING NOTE
Is the patient currently in the state of [place]? YES    Visit mode:VIDEO    If the visit is dropped, the patient can be reconnected by: VIDEO VISIT: Send to e-mail at: [email or web address]    Will anyone else be joining the visit? NO  (If patient encounters technical issues they should call [phone])    How would you like to obtain your AVS? MyChart    Are changes needed to the allergy or medication list? No    Reason for visit: RECHECK    Dhruv CARLTON

## 2024-02-13 ENCOUNTER — VIRTUAL VISIT (OUTPATIENT)
Dept: PSYCHOLOGY | Facility: CLINIC | Age: 29
End: 2024-02-13
Payer: COMMERCIAL

## 2024-02-13 DIAGNOSIS — F15.20 SEVERE STIMULANT USE DISORDER (H): ICD-10-CM

## 2024-02-13 DIAGNOSIS — F41.1 GENERALIZED ANXIETY DISORDER: ICD-10-CM

## 2024-02-13 DIAGNOSIS — F32.A DEPRESSION, UNSPECIFIED DEPRESSION TYPE: ICD-10-CM

## 2024-02-13 DIAGNOSIS — F43.10 POST-TRAUMATIC STRESS DISORDER: Primary | ICD-10-CM

## 2024-02-13 PROCEDURE — 90837 PSYTX W PT 60 MINUTES: CPT | Mod: 95 | Performed by: STUDENT IN AN ORGANIZED HEALTH CARE EDUCATION/TRAINING PROGRAM

## 2024-02-13 ASSESSMENT — COLUMBIA-SUICIDE SEVERITY RATING SCALE - C-SSRS
2. HAVE YOU ACTUALLY HAD ANY THOUGHTS OF KILLING YOURSELF?: NO
1. HAVE YOU WISHED YOU WERE DEAD OR WISHED YOU COULD GO TO SLEEP AND NOT WAKE UP?: NO

## 2024-02-13 NOTE — NURSING NOTE
Is the patient currently in the state of [place]? YES    Visit mode:VIDEO    If the visit is dropped, the patient can be reconnected by: VIDEO VISIT:  Send e-mail to at [email or web address]    Will anyone else be joining the visit? No  (If patient encounters technical issues they should call [phone])    How would you like to obtain your AVS? MyChart    Are changes needed to the allergy or medication list? N/A    Rooming Documentation: Questionnaire(s) not done per department protocol.    Reason for visit: BIANKA Kiser

## 2024-02-13 NOTE — PROGRESS NOTES
[place] [facility] - Progress Note    Date of Service: 24   Name: Sid Miranda  : 1995  Medical Record Number: [id number]  Treating Provider: Francisco Cannon, Ph.D,    Type of Session: Individual  Present in Session: Client and therapist  Session Start and Stop Time: 11:02am-11:55am  Number of Minutes:  53    SERVICE MODALITY:  Video Visit:      Provider verified identity through the following two step process.  Patient provided:  Patient is known previously to provider and Patient was verified at admission/transfer    Telemedicine Visit: The patient's condition can be safely assessed and treated via synchronous audio and visual telemedicine encounter.      Reason for Telemedicine Visit: Patient has requested telehealth visit    Originating Site (Patient Location): Patient's home    Distant Site (Provider Location): [facility]    Consent:  The patient/guardian has verbally consented to: the potential risks and benefits of telemedicine (video visit) versus in person care; bill my insurance or make self-payment for services provided; and responsibility for payment of non-covered services.     Patient would like the video invitation sent by:  My Chart    Mode of Communication:  Video Conference via AmZiippi    Distant Location (Provider):  On-site    As the provider I attest to compliance with applicable laws and regulations related to telemedicine.      DSM-5 Diagnoses:  Encounter Diagnoses   Name Primary?     Post-traumatic stress disorder Yes     Depression, unspecified depression type      Generalized anxiety disorder      Severe stimulant use disorder (H)        Current Reported Symptoms and Status update:  Changes since last session include no recent meth use, increased anxiety secondary to stressors, some depressive symptoms (irritability, anhedonia), PTSD-related avoidance and activation, trouble relaxing, feeling disconnected from  others, difficulty with sleep, self-blame, and strong negative beliefs about himself.    Post Traumatic Stress Disorder: Client reported a history of traumatic events meeting criteria for diagnosis. Client experiences recurrent memories, troubling dreams, physiological reactivity in response to     Generalized Anxiety Disorder: Excessive anxiety and worry about a number of events or activities (such as work or school performance); difficulty controlling worry; restlessness or feeling keyed up or on edge; difficulty concentrating or mind going blank; irritability.      Unspecified Depressive Disorder: Client reports symptoms of depression that do not meet full criteria for diagnosis of MDD. Sometimes include hopelessness, depressed mood, irritability, and anhedonia.      Severe Stimulant Use Disorder: Client has a history of methamphetamine use that significantly negatively impacts functioning, including financial stress, loss of employment, and loss of relationships. He continued to use despite negative consequences. Client has a history of sexual behavior he describes as compulsive when using meth. Further assessment needed to determine if compulsive sexual behaviors also occur separately from meth use.       Progress Toward Treatment Goals:   Satisfactory progress     Therapeutic Interventions/Treatment Strategies:    Area(s) of treatment focus addressed in this session included Symptom Management, Interpersonal Relationship Skills and Sexual Health and Wellness    Client reported attending 12-step meetings for meth use. He processed a recent craving, ways of coping, and that he did not use. Client's apartment complex sent in a request for client's dog to be an emotional support animal. Therapist explained to client the purpose of ESAs and therapist's opinion that a letter of support cannot be written at this time due to an LEA not being involved in treatment/treatment planning. Client expressed understanding.  Therapist assessed client's PTSD symptoms (see PCL-5). Therapist supported client to start identifying traumatic events/memories for which exposure interventions may be helpful. We reviewed self-care, coping, and social support.    Psychotherapist offered support, feedback and validation and reinforced use of skills Treatment modalities used include Cognitive Behavioral Therapy  Support and Feedback    Patient Response:   Patient responded to session by accepting feedback, giving feedback, listening, focusing on goals and accepting support  Possible barriers to participation / learning include: N/A    Current Mental Status Exam:   Appearance:  Appropriate   Eye Contact:  Good   Attitude / Demeanor: Cooperative   Speech      Rate / Production: Normal/ Responsive      Volume:  Normal  volume  Orientation:  All  Mood:   Normal  Affect:   Appropriate   Thought Content: Clear   Insight:   Good       Plan/Need for Future Services:  Return for therapy in 1 week to treat diagnosed problems.    Patient has a current master individualized treatment plan.  See Epic treatment plan for more information.    Referral / Collaboration:  Referral to another professional/service is not indicated at this time..  Emergency Services Needed?  No    Assignment:  Engage self-care, coping, and social support.    Interactive Complexity:  There are four specific communication difficulties that complicate the work of the primary psychiatric procedure.  Interactive complexity (+39051) may be reported when at least one of these difficulties is present.    Communication difficulties present during current the psychiatric procedure include:  1. None.      Signature/Title:    Francisco Cannon, PhD, LP    [place] [facility], Sexual and Gender Health Clinic  Department of Family Medicine and Community Health  [employer] [facility] [employer]

## 2024-02-20 ENCOUNTER — VIRTUAL VISIT (OUTPATIENT)
Dept: PSYCHOLOGY | Facility: CLINIC | Age: 29
End: 2024-02-20
Payer: COMMERCIAL

## 2024-02-20 DIAGNOSIS — F32.A DEPRESSION, UNSPECIFIED DEPRESSION TYPE: ICD-10-CM

## 2024-02-20 DIAGNOSIS — F41.1 GENERALIZED ANXIETY DISORDER: ICD-10-CM

## 2024-02-20 DIAGNOSIS — F15.20 SEVERE STIMULANT USE DISORDER (H): ICD-10-CM

## 2024-02-20 DIAGNOSIS — F43.10 POST-TRAUMATIC STRESS DISORDER: Primary | ICD-10-CM

## 2024-02-20 PROCEDURE — 90834 PSYTX W PT 45 MINUTES: CPT | Mod: 95 | Performed by: STUDENT IN AN ORGANIZED HEALTH CARE EDUCATION/TRAINING PROGRAM

## 2024-02-20 NOTE — NURSING NOTE
Is the patient currently in the state of [place]? YES    Visit mode:VIDEO    If the visit is dropped, the patient can be reconnected by: VIDEO VISIT: Text to cell phone:   Telephone Information:   Mobile [phone]       Will anyone else be joining the visit? NO  (If patient encounters technical issues they should call [phone]    How would you like to obtain your AVS? MyChart    Are changes needed to the allergy or medication list? N/A    Reason for visit: RECHECK    Mildred CARLTON

## 2024-02-20 NOTE — PROGRESS NOTES
[facility] - Progress Note    Date of Service: 24   Name: Sid Miranda  : 1995  Medical Record Number: [id number]  Treating Provider: Francisco Cannon, Ph.D,   Type of Session: Individual  Present in Session: Client and therapist  Session Start and Stop Time: 11:05am-11:48pm  Number of Minutes:  43    SERVICE MODALITY:  Video Visit:      Provider verified identity through the following two step process.  Patient provided:  Patient is known previously to provider and Patient was verified at admission/transfer    Telemedicine Visit: The patient's condition can be safely assessed and treated via synchronous audio and visual telemedicine encounter.      Reason for Telemedicine Visit: Patient has requested telehealth visit    Originating Site (Patient Location): Patient's home    Distant Site (Provider Location): [facility]    Consent:  The patient/guardian has verbally consented to: the potential risks and benefits of telemedicine (video visit) versus in person care; bill my insurance or make self-payment for services provided; and responsibility for payment of non-covered services.     Patient would like the video invitation sent by:  My Chart    Mode of Communication:  Video Conference via [employer]    Distant Location (Provider):  On-site    As the provider I attest to compliance with applicable laws and regulations related to telemedicine.    DSM-5 Diagnoses:  Encounter Diagnoses   Name Primary?     Post-traumatic stress disorder Yes     Depression, unspecified depression type      Generalized anxiety disorder      Severe stimulant use disorder (H)          Current Reported Symptoms and Status update:  Changes since last session includes slightly increased PTSD-related activation; working on non-avoidance; depression and anxiety reasonably controlled with treatment; one recent meth use episode in which client stopped himself after  brief use.     Changes since last session include no recent meth use, increased anxiety secondary to stressors, some depressive symptoms (irritability, anhedonia), PTSD-related avoidance and activation, trouble relaxing, feeling disconnected from others, difficulty with sleep, self-blame, and strong negative beliefs about himself.    Post Traumatic Stress Disorder: Client reported a history of traumatic events meeting criteria for diagnosis. Client experiences recurrent memories, troubling dreams, physiological reactivity in response to     Generalized Anxiety Disorder: Excessive anxiety and worry about a number of events or activities (such as work or school performance); difficulty controlling worry; restlessness or feeling keyed up or on edge; difficulty concentrating or mind going blank; irritability.      Unspecified Depressive Disorder: Client reports symptoms of depression that do not meet full criteria for diagnosis of MDD. Sometimes include hopelessness, depressed mood, irritability, and anhedonia.      Severe Stimulant Use Disorder: Client has a history of methamphetamine use that significantly negatively impacts functioning, including financial stress, loss of employment, and loss of relationships. He continued to use despite negative consequences. Client has a history of sexual behavior he describes as compulsive when using meth. Further assessment needed to determine if compulsive sexual behaviors also occur separately from meth use.       Progress Toward Treatment Goals:   Satisfactory progress     Therapeutic Interventions/Treatment Strategies:    Area(s) of treatment focus addressed in this session included Symptom Management    Client shared his completed list of traumatic memories. He ranked memories from highest to lowest activation. Therapist provided psychoeducation on written exposure therapy for PTSD. Therapist supported client to check in with physical symptoms and emotions. We discussed  grounding strategies, breathing strategies, self-care, coping, and social support. Client expressed feeling ready to start written exposure therapy. We reviewed risks of increased meth-related urges and reviewed sobriety plan.     Psychotherapist offered support, feedback and validation and reinforced use of skills Treatment modalities used include Cognitive Behavioral Therapy  Support, Feedback and Education    Patient Response:   Patient responded to session by accepting feedback, giving feedback, listening, focusing on goals and accepting support  Possible barriers to participation / learning include: N/A    Current Mental Status Exam:   Appearance:  Appropriate   Eye Contact:  Good   Attitude / Demeanor: Cooperative   Speech      Rate / Production: Normal/ Responsive      Volume:  Normal  volume  Orientation:  All  Mood:   Normal  Affect:   Appropriate   Thought Content: Clear   Insight:   Good       Plan/Need for Future Services:  Return for therapy in 1 week to treat diagnosed problems.    Patient has a current master individualized treatment plan.  See Epic treatment plan for more information.    Referral / Collaboration:  Referral to another professional/service is not indicated at this time..  Emergency Services Needed?  No    Assignment:  Self care  Social support   Plan self care for immediately after next session    Interactive Complexity:  There are four specific communication difficulties that complicate the work of the primary psychiatric procedure.  Interactive complexity (+56984) may be reported when at least one of these difficulties is present.    Communication difficulties present during current the psychiatric procedure include:  1. None.        Francisco Cannon, PhD, LP    [facility], [facility]  Department of Family Medicine and Community Health  [employer] [facility] [employer] [facility] [employer]

## 2024-02-27 ENCOUNTER — VIRTUAL VISIT (OUTPATIENT)
Dept: PSYCHOLOGY | Facility: CLINIC | Age: 29
End: 2024-02-27
Payer: COMMERCIAL

## 2024-02-27 DIAGNOSIS — F32.A DEPRESSION, UNSPECIFIED DEPRESSION TYPE: ICD-10-CM

## 2024-02-27 DIAGNOSIS — F15.20 SEVERE STIMULANT USE DISORDER (H): ICD-10-CM

## 2024-02-27 DIAGNOSIS — F41.1 GENERALIZED ANXIETY DISORDER: ICD-10-CM

## 2024-02-27 DIAGNOSIS — F43.10 POST-TRAUMATIC STRESS DISORDER: Primary | ICD-10-CM

## 2024-02-27 PROCEDURE — 90834 PSYTX W PT 45 MINUTES: CPT | Mod: 95 | Performed by: STUDENT IN AN ORGANIZED HEALTH CARE EDUCATION/TRAINING PROGRAM

## 2024-02-27 NOTE — NURSING NOTE
Is the patient currently in the state of [place]? YES    Visit mode:VIDEO    If the visit is dropped, the patient can be reconnected by: VIDEO VISIT: Send to e-mail at: [email or web address]    Will anyone else be joining the visit? NO  (If patient encounters technical issues they should call [phone])    How would you like to obtain your AVS? MyChart    Are changes needed to the allergy or medication list? N/A    Reason for visit: RECHECK    Mildred CARLTON

## 2024-02-27 NOTE — PROGRESS NOTES
[place] - Progress Note    Date of Service: 24   Name: Sid Miranda  : 1995  Medical Record Number: [id number]  Treating Provider: Francisco Cannon, Ph.D,   Type of Session: Individual  Present in Session: Client and therapist  Session Start and Stop Time: 11:03am-11:54am  Number of Minutes:  51     SERVICE MODALITY:  Video Visit:      Provider verified identity through the following two step process.  Patient provided:  Patient is known previously to provider and Patient was verified at admission/transfer    Telemedicine Visit: The patient's condition can be safely assessed and treated via synchronous audio and visual telemedicine encounter.      Reason for Telemedicine Visit: Patient has requested telehealth visit    Originating Site (Patient Location): Patient's home    Distant Site (Provider Location): [facility]    Consent:  The patient/guardian has verbally consented to: the potential risks and benefits of telemedicine (video visit) versus in person care; bill my insurance or make self-payment for services provided; and responsibility for payment of non-covered services.     Patient would like the video invitation sent by:  My Chart    Mode of Communication:  Video Conference via [employer]    Distant Location (Provider):  On-site    As the provider I attest to compliance with applicable laws and regulations related to telemedicine.    DSM-5 Diagnoses:  Encounter Diagnoses   Name Primary?     Post-traumatic stress disorder Yes     Depression, unspecified depression type      Generalized anxiety disorder      Severe stimulant use disorder (H)        Current Reported Symptoms and Status update:  Changes since last session include increased irritability, some trauma-related avoidance, increased depression, some anxiety/worry, ruminative thinking, increased self-care and coping. He denied meth use and urges.    Post Traumatic  Stress Disorder: Client reported a history of traumatic events meeting criteria for diagnosis. Client experiences recurrent memories, troubling dreams, physiological reactivity in response to memories/reminders of traumatic events, and trauma-related avoidance.    Generalized Anxiety Disorder: Excessive anxiety and worry about a number of events or activities (such as work or school performance); difficulty controlling worry; restlessness or feeling keyed up or on edge; difficulty concentrating or mind going blank; irritability.      Unspecified Depressive Disorder: Client reports symptoms of depression that do not meet full criteria for diagnosis of MDD. Sometimes include hopelessness, depressed mood, irritability, and anhedonia.      Severe Stimulant Use Disorder: Client has a history of methamphetamine use that significantly negatively impacts functioning, including financial stress, loss of employment, and loss of relationships. He continued to use despite negative consequences. Client has a history of sexual behavior he describes as compulsive when using meth. Further assessment needed to determine if compulsive sexual behaviors also occur separately from meth use.         Progress Toward Treatment Goals:   Satisfactory progress      Diagnostic assessment 3/21/23  Tx plan 4/18/23    Therapeutic Interventions/Treatment Strategies:    Area(s) of treatment focus addressed in this session included Symptom Management    Client reported feeling some activation since generating his hierarchy of traumatic events last week. He reviewed self-care, social support, and coping. He has been attending 12-step meetings regularly. Therapist provided psychoeducation about exposure therapy. Client then engaged in day 1 of written exposure therapy for PTSD. At the end of the session, we reviewed client's plans for engaging social support and self-care over the next week.    Subjective units of distress at start: 20/100  Subjective  units of distress at end: 45/100    Psychotherapist offered support, feedback and validation and reinforced use of skills Treatment modalities used include Cognitive Behavioral Therapy  Support, Feedback and Structured Activity    Patient Response:   Patient responded to session by accepting feedback, giving feedback, listening, focusing on goals and accepting support  Possible barriers to participation / learning include: N/A    Current Mental Status Exam:   Appearance:  Appropriate   Eye Contact:  Good   Attitude / Demeanor: Cooperative   Speech      Rate / Production: Normal/ Responsive      Volume:  Normal  volume  Orientation:  All  Mood:   Normal  Affect:   Appropriate   Thought Content: Clear   Insight:   Good       Plan/Need for Future Services:  Return for therapy in 1 week to treat diagnosed problems.    Patient has a current master individualized treatment plan.  See Epic treatment plan for more information.    Referral / Collaboration:  Referral to another professional/service is not indicated at this time..  Emergency Services Needed?  No    Assignment:  Social support, self-care, coping, yoga, 12-step meetings    Interactive Complexity:  There are four specific communication difficulties that complicate the work of the primary psychiatric procedure.  Interactive complexity (+17163) may be reported when at least one of these difficulties is present.    Communication difficulties present during current the psychiatric procedure include:  1. None.      Signature/Title:    Francisco Cannon, PhD, LP    [facility], [facility]  Department of Family Medicine and Community Health  [employer] [facility] [employer] [facility] [employer]

## 2024-03-04 ENCOUNTER — VIRTUAL VISIT (OUTPATIENT)
Dept: PSYCHOLOGY | Facility: CLINIC | Age: 29
End: 2024-03-04
Payer: COMMERCIAL

## 2024-03-04 DIAGNOSIS — F43.10 POST-TRAUMATIC STRESS DISORDER: Primary | ICD-10-CM

## 2024-03-04 DIAGNOSIS — F15.20 SEVERE STIMULANT USE DISORDER (H): ICD-10-CM

## 2024-03-04 DIAGNOSIS — F32.A DEPRESSION, UNSPECIFIED DEPRESSION TYPE: ICD-10-CM

## 2024-03-04 DIAGNOSIS — F41.1 GENERALIZED ANXIETY DISORDER: ICD-10-CM

## 2024-03-04 PROCEDURE — 90834 PSYTX W PT 45 MINUTES: CPT | Mod: 95 | Performed by: STUDENT IN AN ORGANIZED HEALTH CARE EDUCATION/TRAINING PROGRAM

## 2024-03-04 NOTE — NURSING NOTE
Is the patient currently in the state of [place]? YES    Visit mode:VIDEO    If the visit is dropped, the patient can be reconnected by: VIDEO VISIT: Send to e-mail at: [email or web address]    Will anyone else be joining the visit? NO  (If patient encounters technical issues they should call [phone])    How would you like to obtain your AVS? MyChart    Are changes needed to the allergy or medication list? No    Reason for visit: RECHECK    Chrissie CARLTON

## 2024-03-04 NOTE — PROGRESS NOTES
[facility] - Progress Note    Date of Service: 3/04/24   Name: Sid Miranda  : 1995  Medical Record Number: [id number]  Treating Provider: Francisco Cannon, PHD  Type of Session: Individual  Present in Session: Client and therapist  Session Start and Stop Time: 4:01pm-4:52pm  Number of Minutes:  51    SERVICE MODALITY:  Video Visit:      Provider verified identity through the following two step process.  Patient provided:  Patient is known previously to provider and Patient was verified at admission/transfer    Telemedicine Visit: The patient's condition can be safely assessed and treated via synchronous audio and visual telemedicine encounter.      Reason for Telemedicine Visit: Patient has requested telehealth visit    Originating Site (Patient Location): Patient's home    Distant Site (Provider Location): [facility]    Consent:  The patient/guardian has verbally consented to: the potential risks and benefits of telemedicine (video visit) versus in person care; bill my insurance or make self-payment for services provided; and responsibility for payment of non-covered services.     Patient would like the video invitation sent by:  My Chart    Mode of Communication:  Video Conference via [employer]    Distant Location (Provider):  On-site    As the provider I attest to compliance with applicable laws and regulations related to telemedicine.    DSM-5 Diagnoses:  Encounter Diagnoses   Name Primary?     Post-traumatic stress disorder Yes     Depression, unspecified depression type      Generalized anxiety disorder      Severe stimulant use disorder (H)      Current Reported Symptoms and Status update:  Changes since last session include some trauma-related activation, urges to use meth, no meth use, some depression and anxiety, engaging social support, practicing self-care, continuing to work.    Post Traumatic Stress Disorder: Client reported a  history of traumatic events meeting criteria for diagnosis. Client experiences recurrent memories, troubling dreams, physiological reactivity in response to memories/reminders of traumatic events, and trauma-related avoidance.    Generalized Anxiety Disorder: Excessive anxiety and worry about a number of events or activities (such as work or school performance); difficulty controlling worry; restlessness or feeling keyed up or on edge; difficulty concentrating or mind going blank; irritability.      Unspecified Depressive Disorder: Client reports symptoms of depression that do not meet full criteria for diagnosis of MDD. Sometimes include hopelessness, depressed mood, irritability, and anhedonia.      Severe Stimulant Use Disorder: Client has a history of methamphetamine use that significantly negatively impacts functioning, including financial stress, loss of employment, and loss of relationships. He continued to use despite negative consequences. Client has a history of sexual behavior he describes as compulsive when using meth. Further assessment needed to determine if compulsive sexual behaviors also occur separately from meth use.       Progress Toward Treatment Goals:   Satisfactory progress     Therapeutic Interventions/Treatment Strategies:    Area(s) of treatment focus addressed in this session included Symptom Management    Client reviewed how he practiced self-care and engaged social support over the past week. He noted some trauma-related activation and urges to use meth, which he was able to effectively manage. He maintained work and relationship functioning. Client overall reported doing well while also engaging in exposure interventions for PTSD. He expressed willingness and readiness for week 2 of the intervention. Therapist supported client through week 2 of written exposure therapy for PTSD. We reviewed plans for self-care, coping, and social support over the next week.    Subjective units of  distress at start: 15/100  Subjective units of distress at end: 55/100    Psychotherapist offered support, feedback and validation and reinforced use of skills Treatment modalities used include written exposure therapy for PTSD.  Support and Structured Activity    Patient Response:   Patient responded to session by accepting feedback, giving feedback, listening, focusing on goals and accepting support  Possible barriers to participation / learning include: N/A    Current Mental Status Exam:   Appearance:  Appropriate   Eye Contact:  Good   Attitude / Demeanor: Cooperative   Speech      Rate / Production: Normal/ Responsive      Volume:  Normal  volume  Orientation:  All  Mood:   Normal  Affect:   Appropriate   Thought Content: Clear   Insight:   Good       Plan/Need for Future Services:  Return for therapy in 1 week to treat diagnosed problems.    Patient has a current master individualized treatment plan.  See Epic treatment plan for more information.    Referral / Collaboration:  Referral to another professional/service is not indicated at this time..  Emergency Services Needed?  No    Assignment:  Self-care  Social support    Interactive Complexity:  There are four specific communication difficulties that complicate the work of the primary psychiatric procedure.  Interactive complexity (+48563) may be reported when at least one of these difficulties is present.    Communication difficulties present during current the psychiatric procedure include:  1. None.      Signature/Title:      Francisco Cannon, PhD, LP    [facility], [facility]  Department of Family Medicine and Community Health  [employer] [facility] [employer] [facility] [employer]

## 2024-03-12 ENCOUNTER — VIRTUAL VISIT (OUTPATIENT)
Dept: PSYCHOLOGY | Facility: CLINIC | Age: 29
End: 2024-03-12
Payer: COMMERCIAL

## 2024-03-12 DIAGNOSIS — F43.10 POST-TRAUMATIC STRESS DISORDER: Primary | ICD-10-CM

## 2024-03-12 DIAGNOSIS — F32.A DEPRESSION, UNSPECIFIED DEPRESSION TYPE: ICD-10-CM

## 2024-03-12 DIAGNOSIS — F15.20 SEVERE STIMULANT USE DISORDER (H): ICD-10-CM

## 2024-03-12 DIAGNOSIS — F41.1 GENERALIZED ANXIETY DISORDER: ICD-10-CM

## 2024-03-12 PROCEDURE — 90837 PSYTX W PT 60 MINUTES: CPT | Mod: 95 | Performed by: STUDENT IN AN ORGANIZED HEALTH CARE EDUCATION/TRAINING PROGRAM

## 2024-03-12 NOTE — NURSING NOTE
Is the patient currently in the state of [place]? YES    Visit mode:VIDEO    If the visit is dropped, the patient can be reconnected by: VIDEO VISIT: Text to cell phone:   Telephone Information:   Mobile [phone]       Will anyone else be joining the visit? No  (If patient encounters technical issues they should call [phone])    How would you like to obtain your AVS? MyChart    Are changes needed to the allergy or medication list? N/A    Rooming Documentation: Questionnaire(s) not done per department protocol.    Reason for visit: BIANKA Whitfield

## 2024-03-12 NOTE — PROGRESS NOTES
[place] [facility] - Progress Note    Date of Service: 3/12/24   Name: Sid Miranda  : 1995  Medical Record Number: [id number]  Treating Provider: Francisco Cannon, Ph.D,    Type of Session: Individual  Present in Session: Client and therapist  Session Start and Stop Time: 10:00am-10:55am  Number of Minutes:  55    SERVICE MODALITY:  Video Visit:      Provider verified identity through the following two step process.  Patient provided:  Patient is known previously to provider and Patient was verified at admission/transfer    Telemedicine Visit: The patient's condition can be safely assessed and treated via synchronous audio and visual telemedicine encounter.      Reason for Telemedicine Visit: Patient has requested telehealth visit    Originating Site (Patient Location): Patient's home    Distant Site (Provider Location): [facility]    Consent:  The patient/guardian has verbally consented to: the potential risks and benefits of telemedicine (video visit) versus in person care; bill my insurance or make self-payment for services provided; and responsibility for payment of non-covered services.     Patient would like the video invitation sent by:  My Chart    Mode of Communication:  Video Conference via [employer]    Distant Location (Provider):  On-site    As the provider I attest to compliance with applicable laws and regulations related to telemedicine.      DSM-5 Diagnoses:  Encounter Diagnoses   Name Primary?     Post-traumatic stress disorder Yes     Depression, unspecified depression type      Generalized anxiety disorder      Severe stimulant use disorder (H)          Current Reported Symptoms and Status update:  Changes since last session includes increased emotions and trauma-related activation secondary to exposure interventions, effective coping with trauma activation (social support, 12-step meetings, yoga), no meth use, increased  depression and anxiety secondary to exposure interventions.    Post Traumatic Stress Disorder: Client reported a history of traumatic events meeting criteria for diagnosis. Client experiences recurrent memories, troubling dreams, physiological reactivity in response to memories/reminders of traumatic events, and trauma-related avoidance.    Generalized Anxiety Disorder: Excessive anxiety and worry about a number of events or activities (such as work or school performance); difficulty controlling worry; restlessness or feeling keyed up or on edge; difficulty concentrating or mind going blank; irritability.      Unspecified Depressive Disorder: Client reports symptoms of depression that do not meet full criteria for diagnosis of MDD. Sometimes include hopelessness, depressed mood, irritability, and anhedonia.      Severe Stimulant Use Disorder: Client has a history of methamphetamine use that significantly negatively impacts functioning, including financial stress, loss of employment, and loss of relationships. He continued to use despite negative consequences. Client has a history of sexual behavior he describes as compulsive when using meth. Further assessment needed to determine if compulsive sexual behaviors also occur separately from meth use.       Progress Toward Treatment Goals:   Satisfactory progress     Therapeutic Interventions/Treatment Strategies:    Area(s) of treatment focus addressed in this session included Symptom Management    Client processed recent increased trauma-related activation resulting from exposure interventions. Therapist provided psychoeducation to validate client's emotional experiences. Client reflected on some meth-related desire and his decisions to increase 12-step meeting attendance and seek extra support. Client expressed judgement about his meth-related desire; therapist supported client to consider this as a result of trauma work and cravings to numb difficult emotions. Client  expressed agreement with this. Client completed step 3 of written exposure therapy. We reviewed coping, self-care, social support, and 12-step participation. Client reported overall decrease in activation following the intervention.    Psychotherapist offered support, feedback and validation and reinforced use of skills Treatment modalities used include written exposure therapy for PTSD.  Support and Feedback    Patient Response:   Patient responded to session by accepting feedback, giving feedback, listening, focusing on goals and accepting support  Possible barriers to participation / learning include: N/A    Current Mental Status Exam:   Appearance:  Appropriate   Eye Contact:  Good   Attitude / Demeanor: Cooperative   Speech      Rate / Production: Normal/ Responsive      Volume:  Normal  volume  Orientation:  All  Mood:   Normal  Affect:   Appropriate   Thought Content: Clear   Insight:   Good       Plan/Need for Future Services:  Return for therapy in 1 week to treat diagnosed problems.    Patient has a current master individualized treatment plan.  See Epic treatment plan for more information.    Referral / Collaboration:  Referral to another professional/service is not indicated at this time..  Emergency Services Needed?  No    Assignment:  Self-care, social support, coping, 12-step meeting participation, yoga    Interactive Complexity:  There are four specific communication difficulties that complicate the work of the primary psychiatric procedure.  Interactive complexity (+60582) may be reported when at least one of these difficulties is present.    Communication difficulties present during current the psychiatric procedure include:  1. None.      Signature/Title:      Francisco Cannon, PhD, LP    [place] [facility], [facility]  Department of Family Medicine and Community Health  [employer] [employer] [employer]

## 2024-03-19 ENCOUNTER — VIRTUAL VISIT (OUTPATIENT)
Dept: PSYCHOLOGY | Facility: CLINIC | Age: 29
End: 2024-03-19
Payer: COMMERCIAL

## 2024-03-19 DIAGNOSIS — F41.1 GENERALIZED ANXIETY DISORDER: ICD-10-CM

## 2024-03-19 DIAGNOSIS — F15.20 SEVERE STIMULANT USE DISORDER (H): ICD-10-CM

## 2024-03-19 DIAGNOSIS — F32.A DEPRESSION, UNSPECIFIED DEPRESSION TYPE: ICD-10-CM

## 2024-03-19 DIAGNOSIS — F43.10 POST-TRAUMATIC STRESS DISORDER: Primary | ICD-10-CM

## 2024-03-19 PROCEDURE — 90837 PSYTX W PT 60 MINUTES: CPT | Mod: 95 | Performed by: STUDENT IN AN ORGANIZED HEALTH CARE EDUCATION/TRAINING PROGRAM

## 2024-03-19 NOTE — NURSING NOTE
Is the patient currently in the state of [place]? YES    Visit mode:VIDEO    If the visit is dropped, the patient can be reconnected by: VIDEO VISIT: Send to e-mail at: [email or web address]    Will anyone else be joining the visit? NO  (If patient encounters technical issues they should call [phone]    How would you like to obtain your AVS? MyChart    Are changes needed to the allergy or medication list? N/A    Reason for visit: RECHECK    Mildred CARLTON

## 2024-03-19 NOTE — PROGRESS NOTES
[place] [facility] - Progress Note    Date of Service: 3/19/24   Name: Sid Miranda  : 1995  Medical Record Number: [id number]  Treating Provider: Francisco Cannon, Ph.D,   Type of Session: Individual  Present in Session: Client and therapist  Session Start and Stop Time: 11:05am-11:58am  Number of Minutes:  53    SERVICE MODALITY:  Video Visit:      Provider verified identity through the following two step process.  Patient provided:  Patient is known previously to provider and Patient was verified at admission/transfer    Telemedicine Visit: The patient's condition can be safely assessed and treated via synchronous audio and visual telemedicine encounter.      Reason for Telemedicine Visit: Patient has requested telehealth visit    Originating Site (Patient Location): Patient's home    Distant Site (Provider Location): [facility]    Consent:  The patient/guardian has verbally consented to: the potential risks and benefits of telemedicine (video visit) versus in person care; bill my insurance or make self-payment for services provided; and responsibility for payment of non-covered services.     Patient would like the video invitation sent by:  My Chart    Mode of Communication:  Video Conference via [employer]    Distant Location (Provider):  On-site    As the provider I attest to compliance with applicable laws and regulations related to telemedicine.      DSM-5 Diagnoses:  Encounter Diagnoses   Name Primary?     Post-traumatic stress disorder Yes     Severe stimulant use disorder (H)      Depression, unspecified depression type      Generalized anxiety disorder          Current Reported Symptoms and Status update:  Changes since last session includes increased trauma-related activation and nightmares, recent methamphetamine use (daily use Wednesday through Saturday), increased anxiety and depression secondary to recent meth use episode.    Post  Traumatic Stress Disorder: Client reported a history of traumatic events meeting criteria for diagnosis. Client experiences recurrent memories, troubling dreams, physiological reactivity in response to memories/reminders of traumatic events, and trauma-related avoidance.    Generalized Anxiety Disorder: Excessive anxiety and worry about a number of events or activities (such as work or school performance); difficulty controlling worry; restlessness or feeling keyed up or on edge; difficulty concentrating or mind going blank; irritability.      Unspecified Depressive Disorder: Client reports symptoms of depression that do not meet full criteria for diagnosis of MDD. Sometimes include hopelessness, depressed mood, irritability, and anhedonia.      Severe Stimulant Use Disorder: Client has a history of methamphetamine use that significantly negatively impacts functioning, including financial stress, loss of employment, and loss of relationships. He continued to use despite negative consequences. Client has a history of sexual behavior he describes as compulsive when using meth. Further assessment needed to determine if compulsive sexual behaviors also occur separately from meth use.       Progress Toward Treatment Goals:   No improvement - client is working on trauma and coping with distress    Therapeutic Interventions/Treatment Strategies:    Area(s) of treatment focus addressed in this session included Symptom Management    Client processed recent nightmares and increased overall activation. He also processed work-related stressors contributing to increased activation. Client shared a recent meth use episode (Weds-Sat) and factors contributing to use (stressors, activation, work problems). Client identified things he did to reduce negative consequences, including continuing to work and being honest with his partner. Client stated he briefly had suicidal ideation while coming down from meth but reached out to social  support. He stated he briefly considered overdosing on opiates. Therapist assessed safety - client denied current suicidal ideation, intent, and plan. He stated he has several Narcan kits at home. He committed to safety. Client discussed wanting to prioritize expanding social support as homework. We made plans to continue trauma work in future sessions.    Psychotherapist offered support, feedback and validation and reinforced use of skills Treatment modalities used include Cognitive Behavioral Therapy  Support and Feedback    Patient Response:   Patient responded to session by accepting feedback, giving feedback, listening, focusing on goals and accepting support  Possible barriers to participation / learning include: N/A    Current Mental Status Exam:   Appearance:  Appropriate   Eye Contact:  Good   Attitude / Demeanor: Cooperative   Speech      Rate / Production: Normal/ Responsive      Volume:  Normal  volume  Orientation:  All  Mood:   Normal  Affect:   Appropriate   Thought Content: Clear   Insight:   Fair       Plan/Need for Future Services:  Return for therapy in 1 week to treat diagnosed problems.    Patient has a current master individualized treatment plan.  See Epic treatment plan for more information.    Referral / Collaboration:  Referral to another professional/service is not indicated at this time..  Emergency Services Needed?  No    Assignment:  Engage social support    Interactive Complexity:  There are four specific communication difficulties that complicate the work of the primary psychiatric procedure.  Interactive complexity (+63218) may be reported when at least one of these difficulties is present.    Communication difficulties present during current the psychiatric procedure include:  1. None.      Francisco Cannon, PhD, LP    [place] [facility], [facility]  Department of Family Medicine and [facility]  [facility]

## 2024-03-26 ENCOUNTER — VIRTUAL VISIT (OUTPATIENT)
Dept: PSYCHOLOGY | Facility: CLINIC | Age: 29
End: 2024-03-26
Payer: COMMERCIAL

## 2024-03-26 DIAGNOSIS — F43.10 POST-TRAUMATIC STRESS DISORDER: Primary | ICD-10-CM

## 2024-03-26 DIAGNOSIS — F32.A DEPRESSION, UNSPECIFIED DEPRESSION TYPE: ICD-10-CM

## 2024-03-26 DIAGNOSIS — F41.1 GENERALIZED ANXIETY DISORDER: ICD-10-CM

## 2024-03-26 DIAGNOSIS — F15.20 SEVERE STIMULANT USE DISORDER (H): ICD-10-CM

## 2024-03-26 PROCEDURE — 90837 PSYTX W PT 60 MINUTES: CPT | Mod: 95 | Performed by: STUDENT IN AN ORGANIZED HEALTH CARE EDUCATION/TRAINING PROGRAM

## 2024-03-26 NOTE — PROGRESS NOTES
[place] [facility] - Progress Note    Date of Service: 3/26/24   Name: Sid Miranda  : 1995  Medical Record Number: [id number]  Treating Provider: Francisco Cannon, Ph.D,   Type of Session: Individual  Present in Session: Client and therapist  Session Start and Stop Time: 11:02am-12:00pm  Number of Minutes:  58    SERVICE MODALITY:  Video Visit:      Provider verified identity through the following two step process.  Patient provided:  Patient is known previously to provider and Patient was verified at admission/transfer    Telemedicine Visit: The patient's condition can be safely assessed and treated via synchronous audio and visual telemedicine encounter.      Reason for Telemedicine Visit: Patient has requested telehealth visit    Originating Site (Patient Location): Patient's home    Distant Site (Provider Location): [facility]    Consent:  The patient/guardian has verbally consented to: the potential risks and benefits of telemedicine (video visit) versus in person care; bill my insurance or make self-payment for services provided; and responsibility for payment of non-covered services.     Patient would like the video invitation sent by:  My Chart    Mode of Communication:  Video Conference via [employer]    Distant Location (Provider):  On-site    As the provider I attest to compliance with applicable laws and regulations related to telemedicine.    DSM-5 Diagnoses:  Encounter Diagnoses   Name Primary?     Post-traumatic stress disorder Yes     Severe stimulant use disorder (H)      Depression, unspecified depression type      Generalized anxiety disorder          Current Reported Symptoms and Status update:  Changes since last session includes overall reduced trauma-related activation due to treatment, no meth use since last session, improvement in depression and anxiety.    Post Traumatic Stress Disorder: Client reported a history of  traumatic events meeting criteria for diagnosis. Client experiences recurrent memories, troubling dreams, physiological reactivity in response to memories/reminders of traumatic events, and trauma-related avoidance.    Generalized Anxiety Disorder: Excessive anxiety and worry about a number of events or activities (such as work or school performance); difficulty controlling worry; restlessness or feeling keyed up or on edge; difficulty concentrating or mind going blank; irritability.      Unspecified Depressive Disorder: Client reports symptoms of depression that do not meet full criteria for diagnosis of MDD. Sometimes include hopelessness, depressed mood, irritability, and anhedonia.      Severe Stimulant Use Disorder: Client has a history of methamphetamine use that significantly negatively impacts functioning, including financial stress, loss of employment, and loss of relationships. He continued to use despite negative consequences. Client has a history of sexual behavior he describes as compulsive when using meth. Further assessment needed to determine if compulsive sexual behaviors also occur separately from meth use.       Progress Toward Treatment Goals:   Satisfactory progress     Therapeutic Interventions/Treatment Strategies:    Area(s) of treatment focus addressed in this session included Symptom Management    Therapist assessed client's mood, activation, and substance use. Client reported improvement in all three areas. He shared that his partner was offered a new job that they are both excited about. Client also shared feeling better since last session and continuing to function well at work. We reviewed coping, self-care, and social support. Client continues to attend 12-step meetings and yoga.    Client completed day 4 of written exposure therapy. Afterwards, we processed the impact of the traumatic memory on current functioning and wellbeing.     SUDS at start: 25/100  SUDS at end:  25/100    Psychotherapist offered support, feedback and validation and reinforced use of skills  Support, Feedback and Structured Activity    Patient Response:   Patient responded to session by accepting feedback, giving feedback, listening, focusing on goals and accepting support  Possible barriers to participation / learning include: N/A    Current Mental Status Exam:   Appearance:  Appropriate   Eye Contact:  Good   Attitude / Demeanor: Cooperative   Speech      Rate / Production: Normal/ Responsive      Volume:  Normal  volume  Orientation:  All  Mood:   Normal  Affect:   Appropriate   Thought Content: Clear   Insight:   Good       Plan/Need for Future Services:  Return for therapy in 1 week to treat diagnosed problems.    Patient has a current master individualized treatment plan.  See Epic treatment plan for more information.    Referral / Collaboration:  Referral to another professional/service is not indicated at this time..  Emergency Services Needed?  No    Assignment:  Self care  Coping  Social support    Interactive Complexity:  There are four specific communication difficulties that complicate the work of the primary psychiatric procedure.  Interactive complexity (+67827) may be reported when at least one of these difficulties is present.    Communication difficulties present during current the psychiatric procedure include:  1. None.      Signature/Title:      Francisco Cannon, PhD, LP    [place] [facility], [facility]  Department of Family Medicine and Community Health  [employer] [facility] [employer] [facility] [employer]

## 2024-03-26 NOTE — NURSING NOTE
Is the patient currently in the state of [place]? YES    Visit mode:VIDEO    If the visit is dropped, the patient can be reconnected by: VIDEO VISIT: Send to e-mail at: [email or web address]    Will anyone else be joining the visit? NO  (If patient encounters technical issues they should call [phone]    How would you like to obtain your AVS? MyChart    Are changes needed to the allergy or medication list? No    Reason for visit: RECHECK    Dhruv CARLTON

## 2024-04-02 ENCOUNTER — VIRTUAL VISIT (OUTPATIENT)
Dept: PSYCHOLOGY | Facility: CLINIC | Age: 29
End: 2024-04-02
Payer: COMMERCIAL

## 2024-04-02 DIAGNOSIS — F15.20 SEVERE STIMULANT USE DISORDER (H): Primary | ICD-10-CM

## 2024-04-02 DIAGNOSIS — F32.A DEPRESSION, UNSPECIFIED DEPRESSION TYPE: ICD-10-CM

## 2024-04-02 DIAGNOSIS — F43.10 POST-TRAUMATIC STRESS DISORDER: ICD-10-CM

## 2024-04-02 DIAGNOSIS — F41.1 GENERALIZED ANXIETY DISORDER: ICD-10-CM

## 2024-04-02 PROCEDURE — 90837 PSYTX W PT 60 MINUTES: CPT | Mod: 95 | Performed by: STUDENT IN AN ORGANIZED HEALTH CARE EDUCATION/TRAINING PROGRAM

## 2024-04-02 NOTE — PROGRESS NOTES
[facility] - Progress Note    Date of Service: 24   Name: Sid Miranda  : 1995  Medical Record Number: [id number]  Treating Provider: Francisco Cannon, Ph.D,   Type of Session: Individual  Present in Session: Client and therapist  Session Start and Stop Time: 11:01am-12:03pm  Number of Minutes:  62    SERVICE MODALITY:  Video Visit:      Provider verified identity through the following two step process.  Patient provided:  Patient is known previously to provider and Patient was verified at admission/transfer    Telemedicine Visit: The patient's condition can be safely assessed and treated via synchronous audio and visual telemedicine encounter.      Reason for Telemedicine Visit: Patient has requested telehealth visit    Originating Site (Patient Location): Patient's home    Distant Site (Provider Location): [facility]    Consent:  The patient/guardian has verbally consented to: the potential risks and benefits of telemedicine (video visit) versus in person care; bill my insurance or make self-payment for services provided; and responsibility for payment of non-covered services.     Patient would like the video invitation sent by:  My Chart    Mode of Communication:  Video Conference via [employer]    Distant Location (Provider):  On-site    As the provider I attest to compliance with applicable laws and regulations related to telemedicine.      DSM-5 Diagnoses:  Encounter Diagnoses   Name Primary?     Severe stimulant use disorder (H) Yes     Depression, unspecified depression type      Post-traumatic stress disorder      Generalized anxiety disorder          Current Reported Symptoms and Status update:  Changes since last session includes reduced trauma related activation, 1 nightmare, some meth-use urges/cravings/euphoric recall, depression and anxiety managed with treatment, engaging social support, exercising, no recent meth  "use.    Post Traumatic Stress Disorder: Client reported a history of traumatic events meeting criteria for diagnosis. Client experiences recurrent memories, troubling dreams, physiological reactivity in response to memories/reminders of traumatic events, and trauma-related avoidance.    Generalized Anxiety Disorder: Excessive anxiety and worry about a number of events or activities (such as work or school performance); difficulty controlling worry; restlessness or feeling keyed up or on edge; difficulty concentrating or mind going blank; irritability.      Unspecified Depressive Disorder: Client reports symptoms of depression that do not meet full criteria for diagnosis of MDD. Sometimes include hopelessness, depressed mood, irritability, and anhedonia.      Severe Stimulant Use Disorder: Client has a history of methamphetamine use that significantly negatively impacts functioning, including financial stress, loss of employment, and loss of relationships. He continued to use despite negative consequences. Client has a history of sexual behavior he describes as compulsive when using meth. Further assessment needed to determine if compulsive sexual behaviors also occur separately from meth use.       Progress Toward Treatment Goals:   Satisfactory progress     Therapeutic Interventions/Treatment Strategies:    Area(s) of treatment focus addressed in this session included Symptom Management    Client shared ways in which he is engaging social support, coping, and exercising. He described feeling fatigued and a little resistant to do exposure work today, which he worked through. Client described negative core beliefs of \"I will always be an IV drug user\" and \"I won't change.\" He was supported by therapist to explore and challenge negative beliefs. He then did session 5 of written exposure therapy for PTSD. He had some difficulties engaging with the prompt, and therapist supported client to start. He then completed 30 " minutes of exposure. Therapist and client processed client's reactions afterward. He described feeling sadness related to his history of trauma and meth use. We reviewed coping, self-care, and social support.    SUDS at start: 20/100    Psychotherapist offered support, feedback and validation, provided redirection and reinforced use of skills  Support and Feedback    Patient Response:   Patient responded to session by accepting feedback, giving feedback, listening, focusing on goals and accepting support  Possible barriers to participation / learning include: N/A    Current Mental Status Exam:   Appearance:  Appropriate   Eye Contact:  Good   Attitude / Demeanor: Cooperative   Speech      Rate / Production: Normal/ Responsive      Volume:  Normal  volume  Orientation:  All  Mood:   Normal  Affect:   Appropriate   Thought Content: Clear   Insight:   Good       Plan/Need for Future Services:  Return for therapy in 1 weeks to treat diagnosed problems.    Patient has a current master individualized treatment plan.  See Epic treatment plan for more information.    Referral / Collaboration:  Referral to another professional/service is not indicated at this time..  Emergency Services Needed?  No    Assignment:  Self-care, coping, social support    Interactive Complexity:  There are four specific communication difficulties that complicate the work of the primary psychiatric procedure.  Interactive complexity (+05809) may be reported when at least one of these difficulties is present.    Communication difficulties present during current the psychiatric procedure include:  1. None.      Francisco Cannon, PhD, LP    [facility], [facility]  Department of Family Medicine and Community Health  [employer] [facility] [employer]

## 2024-04-02 NOTE — NURSING NOTE
Is the patient currently in the state of [place]? YES    Visit mode:VIDEO    If the visit is dropped, the patient can be reconnected by: VIDEO VISIT: Text to cell phone:   Telephone Information:   Mobile [phone]    and VIDEO VISIT: Send to e-mail at: [email or web address]    Will anyone else be joining the visit? NO  (If patient encounters technical issues they should call [phone]    How would you like to obtain your AVS? MyChart    Are changes needed to the allergy or medication list? No    Are refills needed on medications prescribed by this physician? N/A    Reason for visit: RECHECK    Mildred CARLTON

## 2024-04-11 ENCOUNTER — VIRTUAL VISIT (OUTPATIENT)
Dept: PSYCHOLOGY | Facility: CLINIC | Age: 29
End: 2024-04-11
Payer: COMMERCIAL

## 2024-04-11 DIAGNOSIS — F41.1 GENERALIZED ANXIETY DISORDER: ICD-10-CM

## 2024-04-11 DIAGNOSIS — F32.A DEPRESSION, UNSPECIFIED DEPRESSION TYPE: ICD-10-CM

## 2024-04-11 DIAGNOSIS — F15.20 SEVERE STIMULANT USE DISORDER (H): ICD-10-CM

## 2024-04-11 DIAGNOSIS — F43.10 POST-TRAUMATIC STRESS DISORDER: Primary | ICD-10-CM

## 2024-04-11 PROCEDURE — 90834 PSYTX W PT 45 MINUTES: CPT | Mod: 95 | Performed by: STUDENT IN AN ORGANIZED HEALTH CARE EDUCATION/TRAINING PROGRAM

## 2024-04-11 NOTE — PROGRESS NOTES
[facility] - Progress Note    Date of Service: 24   Name: Sid Miranda  : 1995  Medical Record Number: [id number]  Treating Provider: Francisco Cannon, Ph.D,    Type of Session: Individual  Present in Session: Client and therapist  Session Start and Stop Time: 11:05am-11:50  Number of Minutes:  45    SERVICE MODALITY:  Video Visit:      Provider verified identity through the following two step process.  Patient provided:  Patient is known previously to provider and Patient was verified at admission/transfer    Telemedicine Visit: The patient's condition can be safely assessed and treated via synchronous audio and visual telemedicine encounter.      Reason for Telemedicine Visit: Patient has requested telehealth visit    Originating Site (Patient Location): Patient's home    Distant Site (Provider Location): [facility]    Consent:  The patient/guardian has verbally consented to: the potential risks and benefits of telemedicine (video visit) versus in person care; bill my insurance or make self-payment for services provided; and responsibility for payment of non-covered services.     Patient would like the video invitation sent by:  My Chart    Mode of Communication:  Video Conference via [employer]    Distant Location (Provider):  On-site    As the provider I attest to compliance with applicable laws and regulations related to telemedicine.      DSM-5 Diagnoses:  Encounter Diagnoses   Name Primary?     Post-traumatic stress disorder Yes     Severe stimulant use disorder (H)      Depression, unspecified depression type      Generalized anxiety disorder          Current Reported Symptoms and Status update:  Changes since last session include recent meth use, increased conflict with partner, trauma-related activation, depression and anxiety.    Post Traumatic Stress Disorder: Client reported a history of traumatic events meeting criteria  for diagnosis. Client experiences recurrent memories, troubling dreams, physiological reactivity in response to memories/reminders of traumatic events, and trauma-related avoidance.    Generalized Anxiety Disorder: Excessive anxiety and worry about a number of events or activities (such as work or school performance); difficulty controlling worry; restlessness or feeling keyed up or on edge; difficulty concentrating or mind going blank; irritability.      Unspecified Depressive Disorder: Client reports symptoms of depression that do not meet full criteria for diagnosis of MDD. Sometimes include hopelessness, depressed mood, irritability, and anhedonia.      Severe Stimulant Use Disorder: Client has a history of methamphetamine use that significantly negatively impacts functioning, including financial stress, loss of employment, and loss of relationships. He continued to use despite negative consequences. Client has a history of sexual behavior he describes as compulsive when using meth. Further assessment needed to determine if compulsive sexual behaviors also occur separately from meth use.       Progress Toward Treatment Goals:   No improvement     Therapeutic Interventions/Treatment Strategies:    Area(s) of treatment focus addressed in this session included Symptom Management    Client processed recent trauma related activation and ways in which he has difficulty seeing changes he can make in his life. He processed a recent meth use episode and conflict with his partner about their use. He reflected on activation of core beliefs related to both trauma and meth use, and how they affect his relationship. He shared ways in which he believes he and his partner struggle in their use episodes together and separately. We explored negative thinking patterns. We discussed client's plans for self-care for the rest of the week.    Psychotherapist offered support, feedback and validation and reinforced use of skills  Treatment modalities used include Cognitive Behavioral Therapy  Support and Feedback    Patient Response:   Patient responded to session by accepting feedback, giving feedback, listening, focusing on goals and accepting support  Possible barriers to participation / learning include: N/A    Current Mental Status Exam:   Appearance:  Appropriate   Eye Contact:  Fair   Attitude / Demeanor: Interested  Speech      Rate / Production: Normal/ Responsive      Volume:  Normal  volume  Orientation:  All  Mood:   Sad  Anhedonia  Affect:   Subdued   Thought Content: Clear   Insight:   Good       Plan/Need for Future Services:  Return for therapy in 1-2 weeks to treat diagnosed problems.    Patient has a current master individualized treatment plan.  See Epic treatment plan for more information.    Referral / Collaboration:  Referral to another professional/service is not indicated at this time..  Emergency Services Needed?  No    Assignment:  Self-care    Interactive Complexity:  There are four specific communication difficulties that complicate the work of the primary psychiatric procedure.  Interactive complexity (+71043) may be reported when at least one of these difficulties is present.    Communication difficulties present during current the psychiatric procedure include:  1. None.      Signature/Title:    Francisco Cannon, PhD, LP    [facility], [facility]  Department of Family Medicine and Community Health  [facility] [employer]

## 2024-04-11 NOTE — NURSING NOTE
Is the patient currently in the state of [place]? YES    Visit mode:VIDEO    If the visit is dropped, the patient can be reconnected by: VIDEO VISIT:  Send e-mail to at [email or web address]    Will anyone else be joining the visit? No  (If patient encounters technical issues they should call [phone])    How would you like to obtain your AVS? MyChart    Are changes needed to the allergy or medication list? N/A    Are refills needed on medications prescribed by this physician? NO    Rooming Documentation: Questionnaire(s) not done per department protocol.    Reason for visit: RECHECK     BIANKA Delgado

## 2024-04-18 ENCOUNTER — VIRTUAL VISIT (OUTPATIENT)
Dept: PSYCHOLOGY | Facility: CLINIC | Age: 29
End: 2024-04-18
Payer: COMMERCIAL

## 2024-04-18 DIAGNOSIS — F43.10 POST-TRAUMATIC STRESS DISORDER: Primary | ICD-10-CM

## 2024-04-18 DIAGNOSIS — F41.1 GENERALIZED ANXIETY DISORDER: ICD-10-CM

## 2024-04-18 DIAGNOSIS — F32.A DEPRESSION, UNSPECIFIED DEPRESSION TYPE: ICD-10-CM

## 2024-04-18 DIAGNOSIS — F15.20 SEVERE STIMULANT USE DISORDER (H): ICD-10-CM

## 2024-04-18 PROCEDURE — 90837 PSYTX W PT 60 MINUTES: CPT | Mod: 95 | Performed by: STUDENT IN AN ORGANIZED HEALTH CARE EDUCATION/TRAINING PROGRAM

## 2024-04-18 NOTE — NURSING NOTE
Is the patient currently in the state of [place]? YES    Visit mode:VIDEO    If the visit is dropped, the patient can be reconnected by: VIDEO VISIT: Send to e-mail at: [email or web address]    Will anyone else be joining the visit? NO  (If patient encounters technical issues they should call [phone]    How would you like to obtain your AVS? MyChart    Are changes needed to the allergy or medication list? No    Are refills needed on medications prescribed by this physician? NO    Reason for visit: RECHECK    Dhruv CARLTNO

## 2024-04-18 NOTE — PROGRESS NOTES
[facility] - Progress Note    Date of Service: 24   Name: Sid Miranda  : 1995  Medical Record Number: [id number]  Treating Provider: Francisco Cannon, Ph.D,    Type of Session: Individual  Present in Session: Client and therapist  Session Start and Stop Time: 11:02am-11:58am  Number of Minutes:  56    SERVICE MODALITY:  Video Visit:      Provider verified identity through the following two step process.  Patient provided:  Patient is known previously to provider and Patient was verified at admission/transfer    Telemedicine Visit: The patient's condition can be safely assessed and treated via synchronous audio and visual telemedicine encounter.      Reason for Telemedicine Visit: Patient has requested telehealth visit    Originating Site (Patient Location): Patient's home    Distant Site (Provider Location): [facility]    Consent:  The patient/guardian has verbally consented to: the potential risks and benefits of telemedicine (video visit) versus in person care; bill my insurance or make self-payment for services provided; and responsibility for payment of non-covered services.     Patient would like the video invitation sent by:  My Chart    Mode of Communication:  Video Conference via [employer]    Distant Location (Provider):  On-site    As the provider I attest to compliance with applicable laws and regulations related to telemedicine.    DSM-5 Diagnoses:  Encounter Diagnoses   Name Primary?     Post-traumatic stress disorder Yes     Severe stimulant use disorder (H)      Depression, unspecified depression type      Generalized anxiety disorder          Current Reported Symptoms and Status update:  Changes since last session includes sobriety from meth, improvement in trauma-related activation, improvement in depression and anxiety.    Post Traumatic Stress Disorder: Client reported a history of traumatic events meeting criteria  for diagnosis. Client experiences recurrent memories, troubling dreams, physiological reactivity in response to memories/reminders of traumatic events, and trauma-related avoidance.    Generalized Anxiety Disorder: Excessive anxiety and worry about a number of events or activities (such as work or school performance); difficulty controlling worry; restlessness or feeling keyed up or on edge; difficulty concentrating or mind going blank; irritability.      Unspecified Depressive Disorder: Client reports symptoms of depression that do not meet full criteria for diagnosis of MDD. Sometimes include hopelessness, depressed mood, irritability, and anhedonia.      Severe Stimulant Use Disorder: Client has a history of methamphetamine use that significantly negatively impacts functioning, including financial stress, loss of employment, and loss of relationships. He continued to use despite negative consequences. Client has a history of sexual behavior he describes as compulsive when using meth. Further assessment needed to determine if compulsive sexual behaviors also occur separately from meth use.       Progress Toward Treatment Goals:   Satisfactory progress     Therapeutic Interventions/Treatment Strategies:    Area(s) of treatment focus addressed in this session included Symptom Management    Client processed effects of PTSD written exposure therapy. He discussed difficulties with thinking about the future, and processed fears related to disappointment, being a failure, having a mediocre life, and letting others down. Therapist supported client to write these fears down. We discussed potential to continue trauma-related exposure intervention. He started to identify a desire to have fa more fluid sense of the future rather than all-or-nothing, concrete negative vs. Positive future orientations.     Psychotherapist offered support, feedback and validation and reinforced use of skills Treatment modalities used include  Cognitive Behavioral Therapy  Support    Patient Response:   Patient responded to session by accepting feedback, giving feedback, listening, focusing on goals and accepting support  Possible barriers to participation / learning include: N/A    Current Mental Status Exam:   Appearance:  Appropriate   Eye Contact:  Good   Attitude / Demeanor: Cooperative   Speech      Rate / Production: Normal/ Responsive      Volume:  Normal  volume  Orientation:  All  Mood:   Normal  Affect:   Appropriate   Thought Content: Clear   Insight:   Good       Plan/Need for Future Services:  Return for therapy in 1 week to treat diagnosed problems.    Patient has a current master individualized treatment plan.  See Epic treatment plan for more information.    Referral / Collaboration:  Referral to another professional/service is not indicated at this time..  Emergency Services Needed?  No    Assignment:  Journal  Schedule follow-up    Interactive Complexity:  There are four specific communication difficulties that complicate the work of the primary psychiatric procedure.  Interactive complexity (+63664) may be reported when at least one of these difficulties is present.    Communication difficulties present during current the psychiatric procedure include:  1. None.      Signature/Title:    Francisco Cannon, PhD, LP    [facility], [facility]  Department of Family Medicine and Community Health  [facility] [employer]

## 2024-04-23 ENCOUNTER — VIRTUAL VISIT (OUTPATIENT)
Dept: PSYCHOLOGY | Facility: CLINIC | Age: 29
End: 2024-04-23
Payer: COMMERCIAL

## 2024-04-23 DIAGNOSIS — F43.10 POST-TRAUMATIC STRESS DISORDER: Primary | ICD-10-CM

## 2024-04-23 DIAGNOSIS — F32.A DEPRESSION, UNSPECIFIED DEPRESSION TYPE: ICD-10-CM

## 2024-04-23 DIAGNOSIS — F41.1 GENERALIZED ANXIETY DISORDER: ICD-10-CM

## 2024-04-23 DIAGNOSIS — F15.20 SEVERE STIMULANT USE DISORDER (H): ICD-10-CM

## 2024-04-23 PROCEDURE — 90837 PSYTX W PT 60 MINUTES: CPT | Mod: 95 | Performed by: STUDENT IN AN ORGANIZED HEALTH CARE EDUCATION/TRAINING PROGRAM

## 2024-04-23 NOTE — PROGRESS NOTES
[facility] - Progress Note    Date of Service: 24   Name: Sid Miranda  : 1995  Medical Record Number: [id number]  Treating Provider: Francisco Cannon, Ph.D,   Type of Session: Individual  Present in Session: Client and therapist  Session Start and Stop Time: 11:03am-12:00pm  Number of Minutes:  57    SERVICE MODALITY:  Video Visit:      Provider verified identity through the following two step process.  Patient provided:  Patient is known previously to provider and Patient was verified at admission/transfer    Telemedicine Visit: The patient's condition can be safely assessed and treated via synchronous audio and visual telemedicine encounter.      Reason for Telemedicine Visit: Patient has requested telehealth visit    Originating Site (Patient Location): Patient's home    Distant Site (Provider Location): [facility]    Consent:  The patient/guardian has verbally consented to: the potential risks and benefits of telemedicine (video visit) versus in person care; bill my insurance or make self-payment for services provided; and responsibility for payment of non-covered services.     Patient would like the video invitation sent by:  My Chart    Mode of Communication:  Video Conference via [employer]    Distant Location (Provider):  On-site    As the provider I attest to compliance with applicable laws and regulations related to telemedicine.    DSM-5 Diagnoses:  Encounter Diagnoses   Name Primary?     Post-traumatic stress disorder Yes     Severe stimulant use disorder (H)      Depression, unspecified depression type      Generalized anxiety disorder          Current Reported Symptoms and Status update:  Changes since last session includes considering future plans, processing trauma, working through reactions to trauma exposure interventions, no recent methamphetamine use, some depression and anxiety.    Post Traumatic Stress Disorder:  Client reported a history of traumatic events meeting criteria for diagnosis. Client experiences recurrent memories, troubling dreams, physiological reactivity in response to memories/reminders of traumatic events, and trauma-related avoidance.    Generalized Anxiety Disorder: Excessive anxiety and worry about a number of events or activities (such as work or school performance); difficulty controlling worry; restlessness or feeling keyed up or on edge; difficulty concentrating or mind going blank; irritability.      Unspecified Depressive Disorder: Client reports symptoms of depression that do not meet full criteria for diagnosis of MDD. Sometimes include hopelessness, depressed mood, irritability, and anhedonia.      Severe Stimulant Use Disorder: Client has a history of methamphetamine use that significantly negatively impacts functioning, including financial stress, loss of employment, and loss of relationships. He continued to use despite negative consequences. Client has a history of sexual behavior he describes as compulsive when using meth. Further assessment needed to determine if compulsive sexual behaviors also occur separately from meth use.       Progress Toward Treatment Goals:   Satisfactory progress     Therapeutic Interventions/Treatment Strategies:    Area(s) of treatment focus addressed in this session included Symptom Management    Client processed how the trauma he has been engaging in exposure interventions for has impacted his sense of self and his sense of future. We discussed client's potential/possible futures and his fears of pursuing them due to fear of failure. He described a pattern of avoidance towards working towards these goals because he at least can keep them as possibilities without risk of failure. Therapist supported client to identify/write down what these possibilities include - all of which focused on music creation and collaboration. We discussed steps towards pursuing one  of these goals. Client shared how he ruminates about this topic several times per day. We discussed interventions to interrupt ruminative thinking.    Psychotherapist offered support, feedback and validation and reinforced use of skills Treatment modalities used include Cognitive Behavioral Therapy  Support and Feedback    Patient Response:   Patient responded to session by accepting feedback, giving feedback, listening, focusing on goals and accepting support  Possible barriers to participation / learning include: N/A    Current Mental Status Exam:   Appearance:  Appropriate   Eye Contact:  Good   Attitude / Demeanor: Cooperative   Speech      Rate / Production: Normal/ Responsive      Volume:  Normal  volume  Orientation:  All  Mood:   Normal  Affect:   Appropriate   Thought Content: Clear   Insight:   Good       Plan/Need for Future Services:  Return for therapy in 1 week to treat diagnosed problems.    Patient has a current master individualized treatment plan.  See Epic treatment plan for more information.    Referral / Collaboration:  Referral to another professional/service is not indicated at this time..  Emergency Services Needed?  No    Assignment:  Identify steps towards pursuing goals    Interactive Complexity:  There are four specific communication difficulties that complicate the work of the primary psychiatric procedure.  Interactive complexity (+68762) may be reported when at least one of these difficulties is present.    Communication difficulties present during current the psychiatric procedure include:  1. None.      Signature/Title:      Francisco Cannon, PhD, LP    [place] [facility], [facility]  Department of Family Medicine and Community Health  [employer]

## 2024-04-23 NOTE — NURSING NOTE
Is the patient currently in the state of [place]? YES    Visit mode:VIDEO    If the visit is dropped, the patient can be reconnected by: VIDEO VISIT: Send to e-mail at: [email or web address]    Will anyone else be joining the visit? NO  (If patient encounters technical issues they should call [phone])    How would you like to obtain your AVS? MyChart    Are changes needed to the allergy or medication list? N/A    Are refills needed on medications prescribed by this physician? NO    Reason for visit: SHERRY CARLTON

## 2024-04-23 NOTE — PROGRESS NOTES
"Virtual Visit Details    Type of service:  Video Visit   Video Start Time: {video visit start/end time for provider to select:720460}  Video End Time:{video visit start/end time for provider to select:836553}    Originating Location (pt. Location): {video visit patient location:197253::\"Home\"}  {PROVIDER LOCATION On-site should be selected for visits conducted from your clinic location or adjoining [facility] hospital, academic office, or other nearby [facility] building. Off-site should be selected for all other provider locations, including home:873636}  Distant Location (provider location):  {virtual location provider:198956}  Platform used for Video Visit: {Virtual Visit Platforms:577334::\"[employer]\"}        [place] [facility] - Progress Note    Date of Service: 24   Name: Sid Miranda  : 1995  Medical Record Number: [id number]  Treating Provider: Francisco Cannon, Ph.D,   Type of Session: {[facility] Session Type:12008229}  Present in Session: ***  Session Start and Stop Time: ***-***  Number of Minutes:  ***    SERVICE MODALITY:  {Service Modality:694521}    DSM-5 Diagnoses:  ***    Current Reported Symptoms and Status update:  Changes since last session***    Progress Toward Treatment Goals:   {Progress Described:543502}    Therapeutic Interventions/Treatment Strategies:    Area(s) of treatment focus addressed in this session included { AREA OF TREATMENT FOCUS:094720}    ***    {[facility] progress note modalities:836291}  {OP BEH ADULT  THERAPEUTIC INTERVENTIONS & TX STRAT:705739}    Patient Response:   Patient responded to session by {PATIENT RESPONSE:904209}  Possible barriers to participation / learning include: {POSSIBLE BARRIERS:051402}    Current Mental Status Exam:   Appearance:  {Appearance:422220}  Eye Contact:  {Eye Contact:789487}  Attitude / Demeanor: {Attitude:240343}  Speech      Rate / Production: {Rate/Production:335861}      Volume:  {Volume:649046} " volume  Orientation:  {Orientation:519784}  Mood:   {Mood:794956}  Affect:   {Affect:198076}  Thought Content: {Thought Content:308736}  Insight:   {Insight:255971}      Plan/Need for Future Services:  Return for therapy in *** weeks to treat diagnosed problems.    Patient has {OP MH PROGRAMMATIC TX PLAN STATUS:784782}    Referral / Collaboration:  {Referral to Professional:648173}.  Emergency Services Needed?  {07768 (16-60 min) / 25214 (30 min add-on; stackable if needed):769357}    Assignment:  ***    Interactive Complexity:  There are four specific communication difficulties that complicate the work of the primary psychiatric procedure.  Interactive complexity (+90278) may be reported when at least one of these difficulties is present.    Communication difficulties present during current the psychiatric procedure include:  {[facility] INTERACTIVE COMPLEXITY:57181157}      Signature/Title:    Francisco Cannon, Ph.D,

## 2024-04-30 ENCOUNTER — VIRTUAL VISIT (OUTPATIENT)
Dept: PSYCHOLOGY | Facility: CLINIC | Age: 29
End: 2024-04-30
Payer: COMMERCIAL

## 2024-04-30 DIAGNOSIS — F41.1 GENERALIZED ANXIETY DISORDER: ICD-10-CM

## 2024-04-30 DIAGNOSIS — F43.10 POST-TRAUMATIC STRESS DISORDER: Primary | ICD-10-CM

## 2024-04-30 DIAGNOSIS — F15.20 SEVERE STIMULANT USE DISORDER (H): ICD-10-CM

## 2024-04-30 DIAGNOSIS — F32.A DEPRESSION, UNSPECIFIED DEPRESSION TYPE: ICD-10-CM

## 2024-04-30 PROCEDURE — 90834 PSYTX W PT 45 MINUTES: CPT | Mod: 95 | Performed by: STUDENT IN AN ORGANIZED HEALTH CARE EDUCATION/TRAINING PROGRAM

## 2024-04-30 NOTE — NURSING NOTE
Is the patient currently in the state of [place]? YES    Visit mode:VIDEO    If the visit is dropped, the patient can be reconnected by: VIDEO VISIT: Text to cell phone:   Telephone Information:   Mobile [phone]       Will anyone else be joining the visit? NO  (If patient encounters technical issues they should call [phone])    How would you like to obtain your AVS? Declined    Are changes needed to the allergy or medication list? N/A    Are refills needed on medications prescribed by this physician? NO    Reason for visit: RECHECK    Kerri CARLTON

## 2024-04-30 NOTE — PROGRESS NOTES
[place] [facility] - Progress Note    Date of Service: 24   Name: Sid Miranda  : 1995  Medical Record Number: [id number]  Treating Provider: Francisco Cannon, Ph.D,    Type of Session: Individual  Present in Session: Client and therapist  Session Start and Stop Time: 11:05am-11:52am  Number of Minutes:  47    SERVICE MODALITY:  Video Visit:      Provider verified identity through the following two step process.  Patient provided:  Patient is known previously to provider and Patient was verified at admission/transfer    Telemedicine Visit: The patient's condition can be safely assessed and treated via synchronous audio and visual telemedicine encounter.      Reason for Telemedicine Visit: Patient has requested telehealth visit    Originating Site (Patient Location): Patient's home    Distant Site (Provider Location): [facility]    Consent:  The patient/guardian has verbally consented to: the potential risks and benefits of telemedicine (video visit) versus in person care; bill my insurance or make self-payment for services provided; and responsibility for payment of non-covered services.     Patient would like the video invitation sent by:  My Chart    Mode of Communication:  Video Conference via [employer]    Distant Location (Provider):  On-site    As the provider I attest to compliance with applicable laws and regulations related to telemedicine.      DSM-5 Diagnoses:  Encounter Diagnoses   Name Primary?     Post-traumatic stress disorder Yes     Depression, unspecified depression type      Generalized anxiety disorder      Severe stimulant use disorder (H)          Current Reported Symptoms and Status update:  Changes since last session includes improvement in trauma-related activation, exploring career options, less depressed and anxious, no meth use (approximately 1 month).    Post Traumatic Stress Disorder: Client reported a history of  traumatic events meeting criteria for diagnosis. Client experiences recurrent memories, troubling dreams, physiological reactivity in response to memories/reminders of traumatic events, and trauma-related avoidance.    Generalized Anxiety Disorder: Excessive anxiety and worry about a number of events or activities (such as work or school performance); difficulty controlling worry; restlessness or feeling keyed up or on edge; difficulty concentrating or mind going blank; irritability.      Unspecified Depressive Disorder: Client reports symptoms of depression that do not meet full criteria for diagnosis of MDD. Sometimes include hopelessness, depressed mood, irritability, and anhedonia.      Severe Stimulant Use Disorder: Client has a history of methamphetamine use that significantly negatively impacts functioning, including financial stress, loss of employment, and loss of relationships. He continued to use despite negative consequences. Client has a history of sexual behavior he describes as compulsive when using meth. Further assessment needed to determine if compulsive sexual behaviors also occur separately from meth use. Client shared concerns he has about the sociopolitical climate and how it affects his sense of wellness and safety. We highlighted client's resilience, coping, and social support.       Progress Toward Treatment Goals:   Satisfactory progress     Therapeutic Interventions/Treatment Strategies:    Area(s) of treatment focus addressed in this session included Symptom Management and Sexual Health and Wellness    Client reported that he shared with his crystal meth anonymous meeting about how his meth use connects with his self-image and self-worth. He processed how he has derived a sense of self-worth and sense of attractiveness through sex with others and meth use. He reflected on how this relates to trauma experiences. Client described feeling good and relieved after sharing these experiences and  reflections with his CMA meeting. He described feeling a sense of progress in his meth recovery and trauma activation.     Psychotherapist offered support, feedback and validation and reinforced use of skills Treatment modalities used include Cognitive Behavioral Therapy  Support and Feedback    Patient Response:   Patient responded to session by accepting feedback, giving feedback, listening, focusing on goals and accepting support  Possible barriers to participation / learning include: N/A    Current Mental Status Exam:   Appearance:  Appropriate   Eye Contact:  Good   Attitude / Demeanor: Cooperative   Speech      Rate / Production: Normal/ Responsive      Volume:  Normal  volume  Orientation:  All  Mood:   Normal  Affect:   Appropriate   Thought Content: Clear   Insight:   Good       Plan/Need for Future Services:  Return for therapy in 1 week to treat diagnosed problems.    Patient has a current master individualized treatment plan.  See Epic treatment plan for more information.    Referral / Collaboration:  Referral to another professional/service is not indicated at this time..  Emergency Services Needed?  No    Assignment:  Questionnaires  Self-care  Enroll in [employer]    Interactive Complexity:  There are four specific communication difficulties that complicate the work of the primary psychiatric procedure.  Interactive complexity (+44425) may be reported when at least one of these difficulties is present.    Communication difficulties present during current the psychiatric procedure include:  1. None.      Signature/Title:    Francisco Cannon, PhD, LP    [place] [facility], [facility] and [facility] Health Clinic  Department of Family Medicine and Community Health  [employer]

## 2024-05-07 ENCOUNTER — VIRTUAL VISIT (OUTPATIENT)
Dept: PSYCHOLOGY | Facility: CLINIC | Age: 29
End: 2024-05-07
Payer: COMMERCIAL

## 2024-05-07 DIAGNOSIS — F41.1 GENERALIZED ANXIETY DISORDER: Primary | ICD-10-CM

## 2024-05-07 DIAGNOSIS — F43.10 POST-TRAUMATIC STRESS DISORDER: ICD-10-CM

## 2024-05-07 DIAGNOSIS — F15.20 SEVERE STIMULANT USE DISORDER (H): ICD-10-CM

## 2024-05-07 DIAGNOSIS — F32.A DEPRESSION, UNSPECIFIED DEPRESSION TYPE: ICD-10-CM

## 2024-05-07 PROCEDURE — 90837 PSYTX W PT 60 MINUTES: CPT | Mod: 95 | Performed by: STUDENT IN AN ORGANIZED HEALTH CARE EDUCATION/TRAINING PROGRAM

## 2024-05-07 NOTE — PROGRESS NOTES
[place] [facility] - Progress Note    Date of Service: 24   Name: Sid Miranda  : 1995  Medical Record Number: [id number]  Treating Provider: Francisco Cannon, Ph.D,   Type of Session: Individual  Present in Session: Client and therapist  Session Start and Stop Time: 9:01am-9:55am  Number of Minutes:  54    SERVICE MODALITY:  Video Visit:      Provider verified identity through the following two step process.  Patient provided:  Patient is known previously to provider and Patient was verified at admission/transfer    Telemedicine Visit: The patient's condition can be safely assessed and treated via synchronous audio and visual telemedicine encounter.      Reason for Telemedicine Visit: Patient has requested telehealth visit    Originating Site (Patient Location): Patient's home    Distant Site (Provider Location): [facility]    Consent:  The patient/guardian has verbally consented to: the potential risks and benefits of telemedicine (video visit) versus in person care; bill my insurance or make self-payment for services provided; and responsibility for payment of non-covered services.     Patient would like the video invitation sent by:  My Chart    Mode of Communication:  Video Conference via [employer]    Distant Location (Provider):  On-site    As the provider I attest to compliance with applicable laws and regulations related to telemedicine.      DSM-5 Diagnoses:  Encounter Diagnoses   Name Primary?     Generalized anxiety disorder Yes     Depression, unspecified depression type      Post-traumatic stress disorder      Severe stimulant use disorder (H)          Current Reported Symptoms and Status update:  Changes since last session includes recognition of anxiety-related thinking patterns (magical thinking, catastrophic thinking), some anxiety, irritability, reduced trauma activation, maintained sobriety from meth.    Post Traumatic  Stress Disorder: Client reported a history of traumatic events meeting criteria for diagnosis. Client experiences recurrent memories, troubling dreams, physiological reactivity in response to memories/reminders of traumatic events, and trauma-related avoidance.    Generalized Anxiety Disorder: Excessive anxiety and worry about a number of events or activities (such as work or school performance); difficulty controlling worry; restlessness or feeling keyed up or on edge; difficulty concentrating or mind going blank; irritability.      Unspecified Depressive Disorder: Client reports symptoms of depression that do not meet full criteria for diagnosis of MDD. Sometimes include hopelessness, depressed mood, irritability, and anhedonia.      Severe Stimulant Use Disorder: Client has a history of methamphetamine use that significantly negatively impacts functioning, including financial stress, loss of employment, and loss of relationships. He continued to use despite negative consequences. Client has a history of sexual behavior he describes as compulsive when using meth. Further assessment needed to determine if compulsive sexual behaviors also occur separately from meth use.       Progress Toward Treatment Goals:   Satisfactory progress     Therapeutic Interventions/Treatment Strategies:    Area(s) of treatment focus addressed in this session included Symptom Management    Client processed a recent event at work that triggered anxiety and irritability. He identified ways in which he holds himself to a high moral standard and how acting impulsively causes him to ruminate. He identified other situations associated with ruminative thinking processes that feed anxiety and guilt. Client and therapist identified anxious thinking patterns (magical thinking, catastrophic thinking, either-or thinking). Client and therapist discussed ways in which his over-thinking has been adaptive and if there are ways it works against him.  Client will practice noticing when thinking patterns emerge and the effects on functioning.    Psychotherapist offered support, feedback and validation and reinforced use of skills Treatment modalities used include Cognitive Behavioral Therapy  Support and Feedback    Patient Response:   Patient responded to session by accepting feedback, giving feedback, listening, focusing on goals and accepting support  Possible barriers to participation / learning include: N/A    Current Mental Status Exam:   Appearance:  Appropriate   Eye Contact:  Good   Attitude / Demeanor: Cooperative   Speech      Rate / Production: Normal/ Responsive      Volume:  Normal  volume  Orientation:  All  Mood:   Normal  Affect:   Appropriate   Thought Content: Clear   Insight:   Good       Plan/Need for Future Services:  Return for therapy in 1 week to treat diagnosed problems.    Patient has a current master individualized treatment plan.  See Epic treatment plan for more information.    Referral / Collaboration:  Referral to another professional/service is not indicated at this time..  Emergency Services Needed?  No    Assignment:  Notice anxiety-related thinking patterns  Treatment goals    Interactive Complexity:  There are four specific communication difficulties that complicate the work of the primary psychiatric procedure.  Interactive complexity (+63737) may be reported when at least one of these difficulties is present.    Communication difficulties present during current the psychiatric procedure include:  1. None.        Francisco Cannon, PhD, LP    [place] [facility], Sexual and [facility] Health Clinic  Department of Family Medicine and Community Health  [employer]

## 2024-05-07 NOTE — NURSING NOTE
Is the patient currently in the state of [place]? NO    Visit mode:VIDEO    If the visit is dropped, the patient can be reconnected by: VIDEO VISIT: Send to e-mail at: [email or web address]    Will anyone else be joining the visit? NO  (If patient encounters technical issues they should call [phone])    How would you like to obtain your AVS? MyChart    Are changes needed to the allergy or medication list? N/A    Are refills needed on medications prescribed by this physician? NO    Reason for visit: RECHECK    Mildred CARLTON

## 2024-05-13 ENCOUNTER — VIRTUAL VISIT (OUTPATIENT)
Dept: PSYCHOLOGY | Facility: CLINIC | Age: 29
End: 2024-05-13
Payer: COMMERCIAL

## 2024-05-13 DIAGNOSIS — F43.10 POST-TRAUMATIC STRESS DISORDER: ICD-10-CM

## 2024-05-13 DIAGNOSIS — F15.20 SEVERE STIMULANT USE DISORDER (H): ICD-10-CM

## 2024-05-13 DIAGNOSIS — F41.1 GENERALIZED ANXIETY DISORDER: ICD-10-CM

## 2024-05-13 DIAGNOSIS — F32.A DEPRESSION, UNSPECIFIED DEPRESSION TYPE: Primary | ICD-10-CM

## 2024-05-13 PROCEDURE — 90837 PSYTX W PT 60 MINUTES: CPT | Mod: 95 | Performed by: STUDENT IN AN ORGANIZED HEALTH CARE EDUCATION/TRAINING PROGRAM

## 2024-05-13 NOTE — PROGRESS NOTES
[facility] - Progress Note    Date of Service: 24   Name: Sid Miranda  : 1995  Medical Record Number: [id number]  Treating Provider: Francisco Cannon, Ph.D,   Type of Session: Individual  Present in Session: Client and therapist  Session Start and Stop Time: 9:00am-10:00am  Number of Minutes:  60    SERVICE MODALITY:  Video Visit:      Provider verified identity through the following two step process.  Patient provided:  Patient is known previously to provider and Patient was verified at admission/transfer    Telemedicine Visit: The patient's condition can be safely assessed and treated via synchronous audio and visual telemedicine encounter.      Reason for Telemedicine Visit: Patient has requested telehealth visit    Originating Site (Patient Location): Patient's home    Distant Site (Provider Location): [facility]    Consent:  The patient/guardian has verbally consented to: the potential risks and benefits of telemedicine (video visit) versus in person care; bill my insurance or make self-payment for services provided; and responsibility for payment of non-covered services.     Patient would like the video invitation sent by:  My Chart    Mode of Communication:  Video Conference via [employer]    Distant Location (Provider):  On-site    As the provider I attest to compliance with applicable laws and regulations related to telemedicine.      DSM-5 Diagnoses:  Encounter Diagnoses   Name Primary?     Depression, unspecified depression type Yes     Generalized anxiety disorder      Post-traumatic stress disorder      Severe stimulant use disorder (H)          Current Reported Symptoms and Status update:  Changes since last session include recent methamphetamine use (one hour on Thursday), feeling disappointed by recent use, depressed mood, low self-worth, anxiety, irritability, reduced trauma activation.    Post Traumatic Stress  Disorder: Client reported a history of traumatic events meeting criteria for diagnosis. Client experiences recurrent memories, troubling dreams, physiological reactivity in response to memories/reminders of traumatic events, and trauma-related avoidance.    Generalized Anxiety Disorder: Excessive anxiety and worry about a number of events or activities (such as work or school performance); difficulty controlling worry; restlessness or feeling keyed up or on edge; difficulty concentrating or mind going blank; irritability.      Unspecified Depressive Disorder: Client reports symptoms of depression that do not meet full criteria for diagnosis of MDD. Sometimes include hopelessness, depressed mood, irritability, and anhedonia.      Severe Stimulant Use Disorder: Client has a history of methamphetamine use that significantly negatively impacts functioning, including financial stress, loss of employment, and loss of relationships. He continued to use despite negative consequences. Client has a history of sexual behavior he describes as compulsive when using meth. Further assessment needed to determine if compulsive sexual behaviors also occur separately from meth use.       Progress Toward Treatment Goals:   No improvement     Therapeutic Interventions/Treatment Strategies:    Area(s) of treatment focus addressed in this session included Symptom Management, Personal Safety/Harm Reduction, Interpersonal Relationship Skills and Sexual Health and Wellness    Client processed increased feelings of irritability, depression, and boredom leading up to recent meth use on Thursday. He shared that this recent use was less disruptive, time limited, and did not result in unintended consequences. He shared how his recent meth use episodes are not the same feelings of euphoria or excitement. He also shared how use causes him to feel more anxious. We identified negative thinking patterns contributing to depression, irritability, and  meth use. We also discussed client's effective coping and self-care, and how to augment his self-care routine. We discussed client's history of depression treatment, including history of SSRIs and his resistance to using them (due to negative effects to his metabolism). He also tried Wellbutrin in the Fall and briefly noticed increased anxiety/activation and irritability (he discontinued it after less than 1 month). He reported he also used it twice to recover from meth withdrawal episodes.    Psychotherapist offered support, feedback and validation and reinforced use of skills Treatment modalities used include Cognitive Behavioral Therapy  Support and Feedback    Patient Response:   Patient responded to session by accepting feedback, giving feedback, listening, focusing on goals and accepting support  Possible barriers to participation / learning include: N/A    Current Mental Status Exam:   Appearance:  Appropriate   Eye Contact:  Good   Attitude / Demeanor: Cooperative   Speech      Rate / Production: Normal/ Responsive      Volume:  Normal  volume  Orientation:  All  Mood:   Normal  Affect:   Appropriate   Thought Content: Clear   Insight:   Good       Plan/Need for Future Services:  Return for therapy in 1 week to treat diagnosed problems.    Patient has a current master individualized treatment plan.  See Epic treatment plan for more information.    Referral / Collaboration:  Referral to another professional/service is not indicated at this time..  Emergency Services Needed?  No    Assignment:  Behavioral activation and sexual interest    Interactive Complexity:  There are four specific communication difficulties that complicate the work of the primary psychiatric procedure.  Interactive complexity (+79621) may be reported when at least one of these difficulties is present.    Communication difficulties present during current the psychiatric procedure include:  1. None.      Signature/Title:      Francisco  Davis, PhD, LP    [facility], [facility]  Department of Family Medicine and Community Health  [facility]

## 2024-05-13 NOTE — NURSING NOTE
Is the patient currently in the state of [place]? YES    Visit mode:VIDEO    If the visit is dropped, the patient can be reconnected by: VIDEO VISIT: Send to e-mail at: [email or web address]    Will anyone else be joining the visit? NO  (If patient encounters technical issues they should call [phone])    How would you like to obtain your AVS? MyChart    Are changes needed to the allergy or medication list? N/A    Are refills needed on medications prescribed by this physician? NO    Reason for visit: SHERRY CARLTON

## 2024-05-15 ENCOUNTER — TELEPHONE (OUTPATIENT)
Dept: PSYCHIATRY | Facility: CLINIC | Age: 29
End: 2024-05-15
Payer: COMMERCIAL

## 2024-05-15 NOTE — TELEPHONE ENCOUNTER
Date: 5/15/2024    Client Name:  Sid Miranda  Preferred Name: Sid  MRN: [id number]   : 1995  Age:  28 year old    Presenting Problem / Reason for Assessment:     Depression, meth use disorder, anxiety    What is your goal/ hoped-for outcome for services?  Discuss medication treatment options, harm reduction    Referring:     Current/Recent Mental Health Providers  Francisco Cannon    Current Mental Health Diagnoses (if any)    Suggested Program:  Other :  Psychiatry    Interested in Couples/Family Therapy?  No    Any current or past legal concerns we would need to know about?:   No    Patient wishes to be contacted regarding Insurance benefits?:  No    Insurance Benefits to be evaluated.  Note will be entered when validated.    Please Verify Registration    Please send Welcome Packet and document date sent.

## 2024-05-22 ENCOUNTER — OFFICE VISIT (OUTPATIENT)
Dept: PSYCHIATRY | Facility: CLINIC | Age: 29
End: 2024-05-22
Payer: COMMERCIAL

## 2024-05-22 VITALS
SYSTOLIC BLOOD PRESSURE: 119 MMHG | DIASTOLIC BLOOD PRESSURE: 58 MMHG | HEIGHT: 72 IN | HEART RATE: 68 BPM | WEIGHT: 183.2 LBS | BODY MASS INDEX: 24.81 KG/M2

## 2024-05-22 DIAGNOSIS — F41.1 GENERALIZED ANXIETY DISORDER: Primary | ICD-10-CM

## 2024-05-22 DIAGNOSIS — F43.10 POST-TRAUMATIC STRESS DISORDER: ICD-10-CM

## 2024-05-22 DIAGNOSIS — F32.A DEPRESSION, UNSPECIFIED DEPRESSION TYPE: ICD-10-CM

## 2024-05-22 DIAGNOSIS — F15.20 SEVERE STIMULANT USE DISORDER (H): ICD-10-CM

## 2024-05-22 PROCEDURE — 99204 OFFICE O/P NEW MOD 45 MIN: CPT | Performed by: STUDENT IN AN ORGANIZED HEALTH CARE EDUCATION/TRAINING PROGRAM

## 2024-05-22 RX ORDER — ESCITALOPRAM OXALATE 5 MG/1
5 TABLET ORAL DAILY
Qty: 30 TABLET | Refills: 0 | Status: SHIPPED | OUTPATIENT
Start: 2024-05-22 | End: 2024-06-27

## 2024-05-30 ENCOUNTER — VIRTUAL VISIT (OUTPATIENT)
Dept: PSYCHOLOGY | Facility: CLINIC | Age: 29
End: 2024-05-30
Payer: COMMERCIAL

## 2024-05-30 DIAGNOSIS — F15.20 SEVERE STIMULANT USE DISORDER (H): ICD-10-CM

## 2024-05-30 DIAGNOSIS — F32.A DEPRESSION, UNSPECIFIED DEPRESSION TYPE: Primary | ICD-10-CM

## 2024-05-30 DIAGNOSIS — F43.10 POST-TRAUMATIC STRESS DISORDER: ICD-10-CM

## 2024-05-30 DIAGNOSIS — F41.1 GENERALIZED ANXIETY DISORDER: ICD-10-CM

## 2024-05-30 PROCEDURE — 90837 PSYTX W PT 60 MINUTES: CPT | Mod: 95 | Performed by: STUDENT IN AN ORGANIZED HEALTH CARE EDUCATION/TRAINING PROGRAM

## 2024-05-30 NOTE — PROGRESS NOTES
[facility] - Progress Note    Date of Service: 24   Name: Sid Miranda  : 1995  Medical Record Number: [id number]  Treating Provider: Francisco Cannon, Ph.D,    Type of Session: Individual  Present in Session: Client and therapist  Session Start and Stop Time: 11:03am-11:56am  Number of Minutes:  53    SERVICE MODALITY:  Video Visit:      Provider verified identity through the following two step process.  Patient provided:  Patient is known previously to provider and Patient was verified at admission/transfer    Telemedicine Visit: The patient's condition can be safely assessed and treated via synchronous audio and visual telemedicine encounter.      Reason for Telemedicine Visit: Patient has requested telehealth visit    Originating Site (Patient Location): Patient's home    Distant Site (Provider Location): [facility]    Consent:  The patient/guardian has verbally consented to: the potential risks and benefits of telemedicine (video visit) versus in person care; bill my insurance or make self-payment for services provided; and responsibility for payment of non-covered services.     Patient would like the video invitation sent by:  My Chart    Mode of Communication:  Video Conference via [employer]    Distant Location (Provider):  On-site    As the provider I attest to compliance with applicable laws and regulations related to telemedicine.      DSM-5 Diagnoses:  Encounter Diagnoses   Name Primary?    Depression, unspecified depression type Yes    Generalized anxiety disorder     Post-traumatic stress disorder     Severe stimulant use disorder (H)          Current Reported Symptoms and Status update:  Changes since last session includes difficulties with falling asleep, feeling tired/fatigue, some depression and low self-worth, anxiety/worry, no recent meth use. Started Lexapro for anxiety today.    Post Traumatic Stress Disorder: Client  "reported a history of traumatic events meeting criteria for diagnosis. Client experiences recurrent memories, troubling dreams, physiological reactivity in response to memories/reminders of traumatic events, and trauma-related avoidance.    Generalized Anxiety Disorder: Excessive anxiety and worry about a number of events or activities (such as work or school performance); difficulty controlling worry; restlessness or feeling keyed up or on edge; difficulty concentrating or mind going blank; irritability.      Unspecified Depressive Disorder: Client reports symptoms of depression that do not meet full criteria for diagnosis of MDD. Sometimes include hopelessness, depressed mood, irritability, and anhedonia.      Severe Stimulant Use Disorder: Client has a history of methamphetamine use that significantly negatively impacts functioning, including financial stress, loss of employment, and loss of relationships. He continued to use despite negative consequences. Client has a history of sexual behavior he describes as compulsive when using meth. Further assessment needed to determine if compulsive sexual behaviors also occur separately from meth use.     Progress Toward Treatment Goals:   Satisfactory progress     Therapeutic Interventions/Treatment Strategies:    Area(s) of treatment focus addressed in this session included Symptom Management    Client processed increased feelings of depression or being \"in a funk.\" He described decreased sexual interest. He started Lexapro today. Provided psychoeducation on potential side effects and continuing to work with/coordinate care with Neftali West PA-C, at this clinic for medication management. Client processed his feelings about his relationship and work.     Psychotherapist offered support, feedback and validation and reinforced use of skills Treatment modalities used include Cognitive Behavioral Therapy  Support and Feedback    Patient Response:   Patient responded to " session by accepting feedback, giving feedback, listening, focusing on goals, and accepting support  Possible barriers to participation / learning include: N/A    Current Mental Status Exam:   Appearance:  Appropriate   Eye Contact:  Good   Attitude / Demeanor: Cooperative   Speech      Rate / Production: Normal/ Responsive      Volume:  Normal  volume  Orientation:  All  Mood:   Normal  Affect:   Appropriate   Thought Content: Clear   Insight:   Good       Plan/Need for Future Services:  Return for therapy in 2 weeks to treat diagnosed problems.    Patient has a current master individualized treatment plan.  See Epic treatment plan for more information.    Referral / Collaboration:  Referral to another professional/service is not indicated at this time..  Emergency Services Needed?  No    Assignment:  Identify areas in the body activated in response to core beliefs    Interactive Complexity:  There are four specific communication difficulties that complicate the work of the primary psychiatric procedure.  Interactive complexity (+03901) may be reported when at least one of these difficulties is present.    Communication difficulties present during current the psychiatric procedure include:  None.      Signature/Title:      Francisco Cannon, PhD, LP    [facility] [facility], [facility] Clinic  Department of Family Medicine and Community Health  [facility]

## 2024-05-30 NOTE — NURSING NOTE
Is the patient currently in the state of [place]? YES    Visit mode:VIDEO    If the visit is dropped, the patient can be reconnected by: VIDEO VISIT: Send to e-mail at: [email or web address]    Will anyone else be joining the visit? NO  (If patient encounters technical issues they should call [phone])    How would you like to obtain your AVS? Mail a copy    Are changes needed to the allergy or medication list? N/A    Are refills needed on medications prescribed by this physician? NO    Reason for visit: RECHECK    Emilie CARLTON

## 2024-06-04 ENCOUNTER — VIRTUAL VISIT (OUTPATIENT)
Dept: PSYCHOLOGY | Facility: CLINIC | Age: 29
End: 2024-06-04
Payer: COMMERCIAL

## 2024-06-04 DIAGNOSIS — F41.1 GENERALIZED ANXIETY DISORDER: ICD-10-CM

## 2024-06-04 DIAGNOSIS — F43.10 POST-TRAUMATIC STRESS DISORDER: ICD-10-CM

## 2024-06-04 DIAGNOSIS — F32.A DEPRESSION, UNSPECIFIED DEPRESSION TYPE: Primary | ICD-10-CM

## 2024-06-04 DIAGNOSIS — F15.20 SEVERE STIMULANT USE DISORDER (H): ICD-10-CM

## 2024-06-04 PROCEDURE — 90837 PSYTX W PT 60 MINUTES: CPT | Mod: 95 | Performed by: STUDENT IN AN ORGANIZED HEALTH CARE EDUCATION/TRAINING PROGRAM

## 2024-06-04 NOTE — NURSING NOTE
Is the patient currently in the state of [place]? YES    Visit mode:VIDEO    If the visit is dropped, the patient can be reconnected by: VIDEO VISIT: Send to e-mail at: [email or web address]    Will anyone else be joining the visit? NO  (If patient encounters technical issues they should call [phone])    How would you like to obtain your AVS? MyChart    Are changes needed to the allergy or medication list? N/A    Are refills needed on medications prescribed by this physician? NO    Reason for visit: RECHECK    Mildred CARLTON

## 2024-06-04 NOTE — PROGRESS NOTES
[facility] - Progress Note    Date of Service: 24   Name: Sid Miranda  : 1995  Medical Record Number: [id number]  Treating Provider: Francisco Cannon, Ph.D,   Type of Session: Individual  Present in Session: Client and therapist  Session Start and Stop Time: 11:04am-12:02pm  Number of Minutes:  58    SERVICE MODALITY:  Video Visit:      Provider verified identity through the following two step process.  Patient provided:  Patient is known previously to provider and Patient was verified at admission/transfer    Telemedicine Visit: The patient's condition can be safely assessed and treated via synchronous audio and visual telemedicine encounter.      Reason for Telemedicine Visit: Patient has requested telehealth visit    Originating Site (Patient Location): Patient's home    Distant Site (Provider Location): [facility]    Consent:  The patient/guardian has verbally consented to: the potential risks and benefits of telemedicine (video visit) versus in person care; bill my insurance or make self-payment for services provided; and responsibility for payment of non-covered services.     Patient would like the video invitation sent by:  My Chart    Mode of Communication:  Video Conference via [employer]    Distant Location (Provider):  On-site    As the provider I attest to compliance with applicable laws and regulations related to telemedicine.      DSM-5 Diagnoses:  Encounter Diagnoses   Name Primary?    Depression, unspecified depression type Yes    Generalized anxiety disorder     Post-traumatic stress disorder     Severe stimulant use disorder (H)          Current Reported Symptoms and Status update:  Changes since last session includes some feelings of disappointment, working on self-care and structure/routine, some anxiety and worry, reduction in PTSD-related activation, no recent meth use. Recent intense migraine on Saturday.    Post  Traumatic Stress Disorder: Client reported a history of traumatic events meeting criteria for diagnosis. Client experiences recurrent memories, troubling dreams, physiological reactivity in response to memories/reminders of traumatic events, and trauma-related avoidance.    Generalized Anxiety Disorder: Excessive anxiety and worry about a number of events or activities (such as work or school performance); difficulty controlling worry; restlessness or feeling keyed up or on edge; difficulty concentrating or mind going blank; irritability.      Unspecified Depressive Disorder: Client reports symptoms of depression that do not meet full criteria for diagnosis of MDD. Sometimes include hopelessness, depressed mood, irritability, and anhedonia.      Severe Stimulant Use Disorder: Client has a history of methamphetamine use that significantly negatively impacts functioning, including financial stress, loss of employment, and loss of relationships. He continued to use despite negative consequences. Client has a history of sexual behavior he describes as compulsive when using meth. Further assessment needed to determine if compulsive sexual behaviors also occur separately from meth use.     Progress Toward Treatment Goals:   Satisfactory progress     Therapeutic Interventions/Treatment Strategies:    Area(s) of treatment focus addressed in this session included Symptom Management and Sexual Health and Wellness    Client shared concerns about recent increase in depression and fatigue. He had an ocular migraine on Saturday and questioned if it could be related to starting Lexapro last week. Therapist fostered advocacy skills by supporting client to [facility] and contact his provider Neftali West at this clinic. He processed how depressive symptoms are linked to his meth-related urges. Client shared a new self-talk strategy where he reminds himself he does not have to be happy. We discussed the pressure he feels about  being happy and how this helps relieve pressure or urges to fix things. He processed feelings of shame related to depression and boredom proneness. We discussed how personality features (sensations seeking, impulsivity, boredom proneness) may be a factor in client's depression and meth use. We discussed more adaptive ways of expressing these urges/personality traits.    Psychotherapist offered support, feedback and validation and reinforced use of skills Treatment modalities used include Cognitive Behavioral Therapy  Support and Feedback    Patient Response:   Patient responded to session by accepting feedback, giving feedback, listening, focusing on goals, and accepting support  Possible barriers to participation / learning include: N/A    Current Mental Status Exam:   Appearance:  Appropriate   Eye Contact:  Good   Attitude / Demeanor: Cooperative   Speech      Rate / Production: Normal/ Responsive      Volume:  Normal  volume  Orientation:  All  Mood:   Normal  Affect:   Appropriate   Thought Content: Clear   Insight:   Good       Plan/Need for Future Services:  Return for therapy in 1-2 weeks to treat diagnosed problems.    Patient has a current master individualized treatment plan.  See Epic treatment plan for more information.    Referral / Collaboration:  Referral to another professional/service is not indicated at this time..  Emergency Services Needed?  No    Assignment:  Explore ways of seeking novelty or excitement    Interactive Complexity:  There are four specific communication difficulties that complicate the work of the primary psychiatric procedure.  Interactive complexity (+90309) may be reported when at least one of these difficulties is present.    Communication difficulties present during current the psychiatric procedure include:  None.        Francisco Cannon, PhD, LP    [facility], Sexual and Gender Health Clinic  Department of Family Medicine and  Community Health  [facility]

## 2024-06-11 ENCOUNTER — VIRTUAL VISIT (OUTPATIENT)
Dept: PSYCHOLOGY | Facility: CLINIC | Age: 29
End: 2024-06-11
Payer: COMMERCIAL

## 2024-06-11 DIAGNOSIS — F43.10 POST-TRAUMATIC STRESS DISORDER: ICD-10-CM

## 2024-06-11 DIAGNOSIS — F41.1 GENERALIZED ANXIETY DISORDER: ICD-10-CM

## 2024-06-11 DIAGNOSIS — F15.20 SEVERE STIMULANT USE DISORDER (H): ICD-10-CM

## 2024-06-11 DIAGNOSIS — F32.A DEPRESSION, UNSPECIFIED DEPRESSION TYPE: Primary | ICD-10-CM

## 2024-06-11 PROCEDURE — 90837 PSYTX W PT 60 MINUTES: CPT | Mod: 95 | Performed by: STUDENT IN AN ORGANIZED HEALTH CARE EDUCATION/TRAINING PROGRAM

## 2024-06-11 NOTE — NURSING NOTE
Is the patient currently in the state of [place]? YES    Visit mode:VIDEO    If the visit is dropped, the patient can be reconnected by: VIDEO VISIT: Send to e-mail at: [email or web address]    Will anyone else be joining the visit? NO  (If patient encounters technical issues they should call [phone])    How would you like to obtain your AVS? MyChart    Are changes needed to the allergy or medication list? N/A    Are refills needed on medications prescribed by this physician? NO    Reason for visit: RECHECK    Emilie CARLTON

## 2024-06-11 NOTE — PROGRESS NOTES
[facility] - Progress Note    Date of Service: 24   Name: Sid Miranda  : 1995  Medical Record Number: [id number]  Treating Provider: Francisco Cannon, Ph.D,     Type of Session: Individual  Present in Session: Client and therapist  Session Start and Stop Time: 11:06am-11:55am  Number of Minutes:  49    SERVICE MODALITY:  Video Visit:      Provider verified identity through the following two step process.  Patient provided:  Patient is known previously to provider and Patient was verified at admission/transfer    Telemedicine Visit: The patient's condition can be safely assessed and treated via synchronous audio and visual telemedicine encounter.      Reason for Telemedicine Visit: Patient has requested telehealth visit    Originating Site (Patient Location): Patient's home    Distant Site (Provider Location): [facility]    Consent:  The patient/guardian has verbally consented to: the potential risks and benefits of telemedicine (video visit) versus in person care; bill my insurance or make self-payment for services provided; and responsibility for payment of non-covered services.     Patient would like the video invitation sent by:  My Chart    Mode of Communication:  Video Conference via [employer]    Distant Location (Provider):  On-site    As the provider I attest to compliance with applicable laws and regulations related to telemedicine.      DSM-5 Diagnoses:  Encounter Diagnoses   Name Primary?    Depression, unspecified depression type Yes    Severe stimulant use disorder (H)     Post-traumatic stress disorder     Generalized anxiety disorder          Current Reported Symptoms and Status update:  Changes since last session includes depressed mood, recent meth-use episode (IV use), trauma-related activation, anxiety and worry. He is considering a new goal of trying to change how he feels about his current situation. Restarted  Lexapro.    Post Traumatic Stress Disorder: Client reported a history of traumatic events meeting criteria for diagnosis. Client experiences recurrent memories, troubling dreams, physiological reactivity in response to memories/reminders of traumatic events, and trauma-related avoidance.    Generalized Anxiety Disorder: Excessive anxiety and worry about a number of events or activities (such as work or school performance); difficulty controlling worry; restlessness or feeling keyed up or on edge; difficulty concentrating or mind going blank; irritability.      Unspecified Depressive Disorder: Client reports symptoms of depression that do not meet full criteria for diagnosis of MDD. Sometimes include hopelessness, depressed mood, irritability, and anhedonia.      Severe Stimulant Use Disorder: Client has a history of methamphetamine use that significantly negatively impacts functioning, including financial stress, loss of employment, and loss of relationships. He continued to use despite negative consequences. Client has a history of sexual behavior he describes as compulsive when using meth. Further assessment needed to determine if compulsive sexual behaviors also occur separately from meth use.     Progress Toward Treatment Goals:   No improvement     Therapeutic Interventions/Treatment Strategies:    Area(s) of treatment focus addressed in this session included Symptom Management    Client processed his recent experiences of depression and meth use. He identified a cycle of distress, depression, meth use to cope, and trying to escape daily life. Client shared that he wants to work on acceptance/radical acceptance of his current life (work, living situation, etc) as way of working against tension/stress/depression. He shared that he also wants to work on changing how he feels rather than holding himself to a high standard. We identified cognitive restructuring techniques to address thinking and core beliefs. We  discussed a practice of gratitude as a way of developing acceptance and changing negative thinking patterns. Therapist supported client to identify aspects of his life and himself he could accept.    Psychotherapist offered support, feedback and validation and reinforced use of skills Treatment modalities used include Cognitive Behavioral Therapy  Support and Feedback    Patient Response:   Patient responded to session by accepting feedback, giving feedback, listening, focusing on goals, and accepting support  Possible barriers to participation / learning include: N/A    Current Mental Status Exam:   Appearance:  Appropriate   Eye Contact:  Good   Attitude / Demeanor: Cooperative   Speech      Rate / Production: Normal/ Responsive      Volume:  Normal  volume  Orientation:  All  Mood:   Normal  Affect:   Appropriate   Thought Content: Clear   Insight:   Good       Plan/Need for Future Services:  Return for therapy in 2 weeks to treat diagnosed problems.    Patient has a current master individualized treatment plan.  See Epic treatment plan for more information.    Referral / Collaboration:  Referral to another professional/service is not indicated at this time..  Emergency Services Needed?  No    Assignment:  Gratitude practice     Interactive Complexity:  There are four specific communication difficulties that complicate the work of the primary psychiatric procedure.  Interactive complexity (+51966) may be reported when at least one of these difficulties is present.    Communication difficulties present during current the psychiatric procedure include:  None.      Signature/Title:      Francisco Cannon, PhD, LP    [facility], [facility]  Department of Family Medicine and Community Health  [employer] [facility] [employer]

## 2024-06-18 ENCOUNTER — VIRTUAL VISIT (OUTPATIENT)
Dept: PSYCHOLOGY | Facility: CLINIC | Age: 29
End: 2024-06-18
Payer: COMMERCIAL

## 2024-06-18 DIAGNOSIS — F43.10 POST-TRAUMATIC STRESS DISORDER: ICD-10-CM

## 2024-06-18 DIAGNOSIS — F41.1 GENERALIZED ANXIETY DISORDER: ICD-10-CM

## 2024-06-18 DIAGNOSIS — F32.A DEPRESSION, UNSPECIFIED DEPRESSION TYPE: Primary | ICD-10-CM

## 2024-06-18 DIAGNOSIS — F15.20 SEVERE STIMULANT USE DISORDER (H): ICD-10-CM

## 2024-06-18 PROCEDURE — 90837 PSYTX W PT 60 MINUTES: CPT | Mod: 95 | Performed by: STUDENT IN AN ORGANIZED HEALTH CARE EDUCATION/TRAINING PROGRAM

## 2024-06-18 NOTE — NURSING NOTE
Is the patient currently in the state of [place]? YES    Visit mode:VIDEO    If the visit is dropped, the patient can be reconnected by: VIDEO VISIT: Send to e-mail at: [email or web address]    Will anyone else be joining the visit? NO  (If patient encounters technical issues they should call [phone]    How would you like to obtain your AVS? MyChart    Are changes needed to the allergy or medication list? N/A    Are refills needed on medications prescribed by this physician? NO    Reason for visit: RECHECK    Emilie CARLTON

## 2024-06-18 NOTE — PROGRESS NOTES
[facility] - Progress Note    Date of Service: 24   Name: Sid Miranda  : 1995  Medical Record Number: [id number]  Treating Provider: Francisco Cannon, Ph.D,     Type of Session: Individual  Present in Session: Client and therapist  Session Start and Stop Time: 11:06am-12:02pm  Number of Minutes:  56    SERVICE MODALITY:  Video Visit:      Provider verified identity through the following two step process.  Patient provided:  Patient is known previously to provider and Patient was verified at admission/transfer    Telemedicine Visit: The patient's condition can be safely assessed and treated via synchronous audio and visual telemedicine encounter.      Reason for Telemedicine Visit: Patient has requested telehealth visit    Originating Site (Patient Location): Patient's home    Distant Site (Provider Location): [facility]    Consent:  The patient/guardian has verbally consented to: the potential risks and benefits of telemedicine (video visit) versus in person care; bill my insurance or make self-payment for services provided; and responsibility for payment of non-covered services.     Patient would like the video invitation sent by:  My Chart    Mode of Communication:  Video Conference via [email or web address]    Distant Location (Provider):  On-site    As the provider I attest to compliance with applicable laws and regulations related to telemedicine.      DSM-5 Diagnoses:  Encounter Diagnoses   Name Primary?    Depression, unspecified depression type Yes    Severe stimulant use disorder (H)     Post-traumatic stress disorder     Generalized anxiety disorder          Current Reported Symptoms and Status update:  Changes since last session includes recent increased in depression and stress, no use since prior use episode (before last session), some relationship conflict, reduced trauma activation.    Post Traumatic Stress Disorder: Client reported  a history of traumatic events meeting criteria for diagnosis. Client experiences recurrent memories, troubling dreams, physiological reactivity in response to memories/reminders of traumatic events, and trauma-related avoidance.    Generalized Anxiety Disorder: Excessive anxiety and worry about a number of events or activities (such as work or school performance); difficulty controlling worry; restlessness or feeling keyed up or on edge; difficulty concentrating or mind going blank; irritability.      Unspecified Depressive Disorder: Client reports symptoms of depression that do not meet full criteria for diagnosis of MDD. Sometimes include hopelessness, depressed mood, irritability, and anhedonia.      Severe Stimulant Use Disorder: Client has a history of methamphetamine use that significantly negatively impacts functioning, including financial stress, loss of employment, and loss of relationships. He continued to use despite negative consequences. Client has a history of sexual behavior he describes as compulsive when using meth. Further assessment needed to determine if compulsive sexual behaviors also occur separately from meth use.     Progress Toward Treatment Goals:   Satisfactory progress     Therapeutic Interventions/Treatment Strategies:    Area(s) of treatment focus addressed in this session included Symptom Management, Interpersonal Relationship Skills, and Sexual Health and Wellness    Client processed recent conflict with his partner, trust violations, and relationship needs. He connected these experiences to increased feelings of depression and activation of his core belief of being inadequate. Therapist supported client to identify emotions and needs. We practiced communication strategies for client to communicate concerns to his partner. We discussed effects of the relationship on client's substance use and depressive patterns.    Psychotherapist offered support, feedback and validation and  reinforced use of skills Treatment modalities used include Cognitive Behavioral Therapy  Support and Feedback    Patient Response:   Patient responded to session by accepting feedback, giving feedback, listening, focusing on goals, and accepting support  Possible barriers to participation / learning include: N/A    Current Mental Status Exam:   Appearance:  Appropriate   Eye Contact:  Good   Attitude / Demeanor: Cooperative   Speech      Rate / Production: Normal/ Responsive      Volume:  Normal  volume  Orientation:  All  Mood:   Normal  Affect:   Appropriate   Thought Content: Clear   Insight:   Good       Plan/Need for Future Services:  Return for therapy in 1-2 weeks to treat diagnosed problems.    Patient has a current master individualized treatment plan.  See Epic treatment plan for more information.    Referral / Collaboration:  Referral to another professional/service is not indicated at this time..  Emergency Services Needed?  No    Assignment:  Communicate feelings    Interactive Complexity:  There are four specific communication difficulties that complicate the work of the primary psychiatric procedure.  Interactive complexity (+35143) may be reported when at least one of these difficulties is present.    Communication difficulties present during current the psychiatric procedure include:  None.      Signature/Title:      Francisco Cannon, PhD, LP    [facility], [facility]  Department of Family Medicine and Community Health  [facility] [employer]

## 2024-06-27 ENCOUNTER — VIRTUAL VISIT (OUTPATIENT)
Dept: PSYCHOLOGY | Facility: CLINIC | Age: 29
End: 2024-06-27
Payer: COMMERCIAL

## 2024-06-27 ENCOUNTER — VIRTUAL VISIT (OUTPATIENT)
Dept: PSYCHIATRY | Facility: CLINIC | Age: 29
End: 2024-06-27
Payer: COMMERCIAL

## 2024-06-27 DIAGNOSIS — F33.1 MODERATE EPISODE OF RECURRENT MAJOR DEPRESSIVE DISORDER (H): Primary | ICD-10-CM

## 2024-06-27 DIAGNOSIS — F41.1 GENERALIZED ANXIETY DISORDER: ICD-10-CM

## 2024-06-27 DIAGNOSIS — R11.0 NAUSEA: ICD-10-CM

## 2024-06-27 DIAGNOSIS — F15.20 SEVERE STIMULANT USE DISORDER (H): ICD-10-CM

## 2024-06-27 DIAGNOSIS — F43.10 POST-TRAUMATIC STRESS DISORDER: ICD-10-CM

## 2024-06-27 DIAGNOSIS — F15.20 SEVERE STIMULANT USE DISORDER (H): Primary | ICD-10-CM

## 2024-06-27 PROCEDURE — G2211 COMPLEX E/M VISIT ADD ON: HCPCS | Mod: 95 | Performed by: STUDENT IN AN ORGANIZED HEALTH CARE EDUCATION/TRAINING PROGRAM

## 2024-06-27 PROCEDURE — 90837 PSYTX W PT 60 MINUTES: CPT | Mod: 95 | Performed by: STUDENT IN AN ORGANIZED HEALTH CARE EDUCATION/TRAINING PROGRAM

## 2024-06-27 PROCEDURE — 99214 OFFICE O/P EST MOD 30 MIN: CPT | Mod: 95 | Performed by: STUDENT IN AN ORGANIZED HEALTH CARE EDUCATION/TRAINING PROGRAM

## 2024-06-27 RX ORDER — ONDANSETRON 4 MG/1
4 TABLET, ORALLY DISINTEGRATING ORAL EVERY 8 HOURS PRN
Qty: 30 TABLET | Refills: 0 | Status: SHIPPED | OUTPATIENT
Start: 2024-06-27

## 2024-06-27 RX ORDER — ESCITALOPRAM OXALATE 10 MG/1
10 TABLET ORAL DAILY
Qty: 30 TABLET | Refills: 0 | Status: SHIPPED | OUTPATIENT
Start: 2024-06-27 | End: 2024-07-11

## 2024-06-27 RX ORDER — NALTREXONE HYDROCHLORIDE 50 MG/1
50 TABLET, FILM COATED ORAL DAILY
Qty: 30 TABLET | Refills: 0 | Status: SHIPPED | OUTPATIENT
Start: 2024-06-27 | End: 2024-07-11

## 2024-06-27 NOTE — NURSING NOTE
Is the patient currently in the state of [place]? YES    Visit mode:VIDEO    If the visit is dropped, the patient can be reconnected by: VIDEO VISIT: Send to e-mail at: [email or web address]    Will anyone else be joining the visit? NO  (If patient encounters technical issues they should call [phone]    How would you like to obtain your AVS? MyChart    Are changes needed to the allergy or medication list? N/A    Are refills needed on medications prescribed by this physician? NO    Reason for visit: SHERRY CARLTON

## 2024-06-27 NOTE — PROGRESS NOTES
[facility] - Progress Note    Date of Service: 24   Name: Sid Miranda  : 1995  Medical Record Number: [id number]  Treating Provider: Francisco Cannon, Ph.D,   Type of Session: Individual  Present in Session: client and therapist  Session Start and Stop Time: 11:01am-11:55am  Number of Minutes:  54    SERVICE MODALITY:  Video Visit:      Provider verified identity through the following two step process.  Patient provided:  Patient is known previously to provider and Patient was verified at admission/transfer    Telemedicine Visit: The patient's condition can be safely assessed and treated via synchronous audio and visual telemedicine encounter.      Reason for Telemedicine Visit: Patient has requested telehealth visit    Originating Site (Patient Location): Patient's home    Distant Site (Provider Location): [facility]    Consent:  The patient/guardian has verbally consented to: the potential risks and benefits of telemedicine (video visit) versus in person care; bill my insurance or make self-payment for services provided; and responsibility for payment of non-covered services.     Patient would like the video invitation sent by:  My Chart    Mode of Communication:  Video Conference via [employer]    Distant Location (Provider):  On-site    As the provider I attest to compliance with applicable laws and regulations related to telemedicine.      DSM-5 Diagnoses:  Encounter Diagnoses   Name Primary?    Moderate episode of recurrent major depressive disorder (H) Yes    Severe stimulant use disorder (H)     Generalized anxiety disorder     Post-traumatic stress disorder        Current Reported Symptoms and Status update:  Changes since last session includes increased depression, isolation, low mood, anhedonia, thoughts of death (denied intent and plan), ruminative negative thinking, anxiety. Last meth use was a few weeks ago. Taking  lexapro consistently.    Post Traumatic Stress Disorder: Client reported a history of traumatic events meeting criteria for diagnosis. Client experiences recurrent memories, troubling dreams, physiological reactivity in response to memories/reminders of traumatic events, and trauma-related avoidance.    Generalized Anxiety Disorder: Excessive anxiety and worry about a number of events or activities (such as work or school performance); difficulty controlling worry; restlessness or feeling keyed up or on edge; difficulty concentrating or mind going blank; irritability.      Depression: Client reports hopelessness, depressed mood, irritability, anhedonia, low self-worth, isolation, fatigue, poor sleep, decreased functioning.      Severe Stimulant Use Disorder: Client has a history of methamphetamine use that significantly negatively impacts functioning, including financial stress, loss of employment, and loss of relationships. He continued to use despite negative consequences. Client has a history of sexual behavior he describes as compulsive when using meth. Further assessment needed to determine if compulsive sexual behaviors also occur separately from meth use.     Progress Toward Treatment Goals:   Satisfactory progress     Therapeutic Interventions/Treatment Strategies:    Area(s) of treatment focus addressed in this session included Symptom Management    Client discussed increased symptoms of depression that he described as slowly building over the past 2 months but worsening lately. He processed how depression interacts with his body image, sense of self-worth, relationship, and motivation for change. He has not used meth for 3 weeks. He reported intrusive thoughts of death occurring approximately daily. One time - a few days ago - he briefly googled poison as a potential means but stopped. He denied intent and plan. He expressed motivation for living and was future oriented. We discussed safety resources.  Client plans to meet with his psychiatry provider, Neville West, at this clinic today to discuss medication for depression. Therapist and client discussed therapist being away for 2 weeks. Client will try to schedule follow-up with Neville West during this therapist's absence to continue to monitor depression. Client identified negative thinking patterns contributing to depression. We collaboratively identified client's strengths, resilience, and protective factors.     Psychotherapist offered support, feedback and validation and reinforced use of skills Treatment modalities used include Cognitive Behavioral Therapy  Support and Feedback    Patient Response:   Patient responded to session by accepting feedback, giving feedback, listening, focusing on goals, and accepting support  Possible barriers to participation / learning include: N/A    Current Mental Status Exam:   Appearance:  Appropriate   Eye Contact:  Good   Attitude / Demeanor: Cooperative   Speech      Rate / Production: Normal/ Responsive      Volume:  Normal  volume  Orientation:  All  Mood:   Depressed   Affect:   Blunted   Thought Content: Clear   Insight:   Good       Plan/Need for Future Services:  Return for therapy in 1-2 weeks to treat diagnosed problems.    Patient has a current master individualized treatment plan.  See Epic treatment plan for more information.    Referral / Collaboration:  Referral to another professional/service is not indicated at this time..  Emergency Services Needed?  No    Assignment:  Attend appt with Neville West  Schedule with Neville West in approx 2 weeks    Therapist contacted Neville West to coordinate care    Interactive Complexity:  There are four specific communication difficulties that complicate the work of the primary psychiatric procedure.  Interactive complexity (+51731) may be reported when at least one of these difficulties is present.    Communication difficulties present during current the  psychiatric procedure include:  None.      Signature/Title:    Francisco Cannon, PhD, LP    [facility] [place] [facility], [facility]  Department of Family Medicine and Community Health  [facility]

## 2024-06-27 NOTE — NURSING NOTE
Is the patient currently in the state of [place]? YES    Visit mode:VIDEO    If the visit is dropped, the patient can be reconnected by: VIDEO VISIT:  Send e-mail to at [email or web address]    Will anyone else be joining the visit? No  (If patient encounters technical issues they should call [phone])    How would you like to obtain your AVS? MyChart    Are changes needed to the allergy or medication list? No    Are refills needed on medications prescribed by this physician? NO    Rooming Documentation: Patient declined to complete qnrs with VF.    Reason for visit: SHERRY Bernard, F      Care team has reviewed attendance agreement with patient. Patient advised that two failed appointments within 6 months may lead to termination of current episode of care.

## 2024-06-27 NOTE — PROGRESS NOTES
Virtual Visit Details    Type of service:  Video Visit   Video Start Time:  2:30p  Video End Time: 3:00p    Originating Location (pt. Location): Home    Distant Location (provider location):  Off-site  Platform used for Video Visit: [employer]      PSYCHIATRIC MEDICATION FOLLOW UP APPT     Name:  Sid Miranda  : 1995    Patient attended the phone/video session alone.    Last seen for outpatient psychiatry Consultation on 24.      FOLLOWING PLAN PUT INTO PLACE: yes    INTERIM HISTORY     COMMUNICATIONS FROM PATIENT VIA:  noted ocular migraines while starting lexapro 3 days after starting it.     RECORDS AVAILABLE FOR REVIEW: EHR records through [employer] .    HISTORY OF PRESENT ILLNESS   Reports past diagnosis of: substance abuse disorder, generalized anxiety,      Seeing Francisco a bit over a year now. Narrowing goals for treatment. Barriers to improvement  Would like to address baseline depression and meth use.   First sought treatment: in late childhood. Saw therapy at age 8 and 11-also had psych meds and stopped psychotherapist around 14 and continued medsf ora few years. Therapisy in mid 20s.   Since then recovery at 24. Substance abuse treatment centers. Before francisco in DBT.   Couple of hospitalizations:   First at 11: 3 day thing.   Inpatient 12 (3 day eval after pretending suidicde attempt) and one at 13 (SIB).   Has community support. Closest friends in [facility] in active use.   Boyfriend in recovery   Had a sponsor and was fired.     Sid Miranda is a 28 year old White Not  or  male presenting for psychiatric evaluation and medication management. Information is obtained from patient and available records.  Reports history of    Generalized anxiety disorder  Depression, unspecified depression type  Post-traumatic stress disorder  Severe stimulant use disorder (H)   Previously psychiatrically hospitalized during adolescence various times and chemical dependency treatment. History of IOP  as well.  Hx of suicidal ideation and attempts.   Hx of self-injurious behaviors in the form of cutting starting at age middle school.  Genetically loaded for  substance use, schizophrenia. Grew up in an intact home with all basic needs being met.     Sid was seen today for consult.     His stimulant use anxiety and depression are not controlled it is reassuring he is in therapy is currently working with therapist for harm reduction with stimulant use disorder.  I am prescribing Lexapro 5 mg to address anxiety and depression to better overall mood.  Follow-up in 1 month.     Today: 6/27/24:     Started lexapro briefly, get in next week with primary, imitrex prescribed for migraines no more episodes, end of that week. Use episide for one day. Then over. Restarted lexapro for 3 weeks. Spoke with brenda. Increase depression. Couple other variables: Money issues and other variables impacting depression. Interested in naltrexone for substance use.     FAMILY, MEDICAL, SURGICAL HISTORY REVIEWED.  MEDICATION HAVE BEEN REVIEWED AND ARE CURRENT TO THE BEST OF MY KNOWLEDGE AND ABILITY.      MEDICATIONS                                                                                                Current Outpatient Medications   Medication Sig Dispense Refill    escitalopram (LEXAPRO) 5 MG tablet Take 1 tablet (5 mg) by mouth daily (Patient not taking: Reported on 6/27/2024) 30 tablet 0     No current facility-administered medications for this visit.         PSYCHOTROPIC DRUG INTERACTIONS                                         none    MANAGEMENT:  routine monitoring    TODAY PATIENT REPORTS THE FOLLOWING PSYCHIATRIC ROS:   EXERCISE: Adequate  SIDE EFFECTS:  tolerating medications without reported side effects  COMPLIANCE:  states Adherent to medication regimen  REPORTS THE FOLLOWING NEW MEDICAL ISSUES:  none    PROBLEM: DEPRESSION: Worsening     PHQ9 score is Not completed today  Suicidal ideation:  Yes passive no intent or  plan.       PROBLEM: ANXIETY: Worsening.   GAD7 score is Not completed today      4/4/2023     8:55 AM   LORENZO-7 SCORE   Total Score 6 (mild anxiety)   Total Score 6    6       PROBLEM: CHRONIC SUICIDAL IDEATIONS: current: Yes passive, no intent, no plan.       PROBLEM: SLEEP/INSOMNIA: sleeping every day. Nights when work for 6 hours. Gets 2 hours again during the day. Gets 8-12 hours. Persistent fatigue. Demands of job. Stays lighter outside in summer. Not imbalanced.       PERTINENT PAST MEDICAL AND SURGICAL HISTORY   No past medical history on file.    VITALS     BP Readings from Last 1 Encounters:   05/22/24 119/58     Pulse Readings from Last 1 Encounters:   05/22/24 68     Wt Readings from Last 1 Encounters:   05/22/24 83.1 kg (183 lb 3.2 oz)     Ht Readings from Last 1 Encounters:   05/22/24 1.829 m (6')     Estimated body mass index is 24.85 kg/m  as calculated from the following:    Height as of 5/22/24: 1.829 m (6').    Weight as of 5/22/24: 83.1 kg (183 lb 3.2 oz).    ALLERGY & IMMUNIZATIONS     No Known Allergies    MEDICAL REVIEW OF SYSTEMS:   Ten system review was completed with pertinent positives noted     MENTAL STATUS EXAM:   General/Constitutional:  Appearance:  awake, alert, adequately groomed, appeared stated age and no apparent distress  Attitude:  cooperative   Eye Contact:  good  Musculoskeletal:  Psychomotor Behavior:  no evidence of tardive dyskinesia, dystonia, or tics from the head up  Psychiatric:  Speech:  clear, coherent, regular rate, rhythm, and volume,  No pressure speech noted.  Associations:  no loose associations  Thought Process:  logical, linear and goal oriented  Thought Content:  Evidence of suicidal ideation. No evidence of homicidal ideation, no evidence of psychotic thought, no auditory hallucinations present and no visual hallucinations present  Mood:  sad  and depressed  Affect:  restricted (limited variability of emotion during exam) and was congruent to speech  content.  Insight:  good  Judgment: fair  Impulse Control:  intact  Neurological:  Oriented to:  person, place, time, and situation  Attention Span and Concentration:  Able to attend to the interview     Language: intact    Recent and Remote Memory:  Intact to interview. Not formally assessed. No amnesia.   Fund of Knowledge: appropriate          DSM 5 DIAGNOSIS:      Generalized anxiety disorder  Severe stimulant use disorder (H)  Nausea      MEDICAL COMORBIDITY IMPACTING CLINICAL PICTURE: None noted.  Known issue that I take into account for their medical decisions, no current exacerbations or new concerns      ASSESSMENT AND PLAN    Sid was seen today for recheck.  Prescribed naltrexone for stimulant use. Advised him he may experience nausea and gave him zofran as needed. Increased lexapro to 10mg to address depression and anxiety. Spent time talking about safety due to passive thoughts of suicidal ideation and talked about resources if he needed them. Follow up in a month.   Diagnoses and all orders for this visit:    Severe stimulant use disorder (H)  -     naltrexone (DEPADE/REVIA) 50 MG tablet; Take 1 tablet (50 mg) by mouth daily    Generalized anxiety disorder  -     escitalopram (LEXAPRO) 10 MG tablet; Take 1 tablet (10 mg) by mouth daily    Nausea  -     ondansetron (ZOFRAN ODT) 4 MG ODT tab; Take 1 tablet (4 mg) by mouth every 8 hours as needed for nausea    HIGH RISK MEDICATION:  No        ADMINISTRATIVE BILLING:   The longitudinal plan of care for the diagnosis(es)/condition(s) as documented were addressed during this visit. Due to the added complexity in care, I will continue to support Sid in the subsequent management and with ongoing continuity of care.        Patient Status:  Patient will continue to be seen for ongoing consultation and stabilization.    Signed:   Neville West PA-C

## 2024-07-11 ENCOUNTER — VIRTUAL VISIT (OUTPATIENT)
Dept: PSYCHIATRY | Facility: CLINIC | Age: 29
End: 2024-07-11
Payer: COMMERCIAL

## 2024-07-11 VITALS — WEIGHT: 180 LBS | BODY MASS INDEX: 24.38 KG/M2 | HEIGHT: 72 IN

## 2024-07-11 DIAGNOSIS — F15.20 SEVERE STIMULANT USE DISORDER (H): ICD-10-CM

## 2024-07-11 DIAGNOSIS — F41.1 GENERALIZED ANXIETY DISORDER: ICD-10-CM

## 2024-07-11 PROCEDURE — 99214 OFFICE O/P EST MOD 30 MIN: CPT | Mod: 95 | Performed by: STUDENT IN AN ORGANIZED HEALTH CARE EDUCATION/TRAINING PROGRAM

## 2024-07-11 PROCEDURE — G2211 COMPLEX E/M VISIT ADD ON: HCPCS | Mod: 95 | Performed by: STUDENT IN AN ORGANIZED HEALTH CARE EDUCATION/TRAINING PROGRAM

## 2024-07-11 RX ORDER — ESCITALOPRAM OXALATE 10 MG/1
10 TABLET ORAL DAILY
Qty: 30 TABLET | Refills: 0 | Status: SHIPPED | OUTPATIENT
Start: 2024-07-11 | End: 2024-08-15

## 2024-07-11 RX ORDER — NALTREXONE HYDROCHLORIDE 50 MG/1
50 TABLET, FILM COATED ORAL DAILY
Qty: 30 TABLET | Refills: 0 | Status: SHIPPED | OUTPATIENT
Start: 2024-07-11 | End: 2024-08-15

## 2024-07-11 ASSESSMENT — PAIN SCALES - GENERAL: PAINLEVEL: NO PAIN (0)

## 2024-07-11 NOTE — PROGRESS NOTES
Virtual Visit Details    Type of service:  Video Visit   Video Start Time:  11:30a  Video End Time: 12:00p    Originating Location (pt. Location): Home    Distant Location (provider location):  Off-site  Platform used for Video Visit: [employer]      PSYCHIATRIC MEDICATION FOLLOW UP APPT     Name:  Sid Miranda  : 1995    Patient attended the phone/video session alone.    Last seen for outpatient psychiatry Return Visit on 24.      FOLLOWING PLAN PUT INTO PLACE: yes    INTERIM HISTORY     COMMUNICATIONS FROM PATIENT VIA: none    RECORDS AVAILABLE FOR REVIEW: EHR records through [employer] .   HISTORY OF PRESENT ILLNESS   Reports past diagnosis of: substance abuse disorder, generalized anxiety,      Seeing Francisco a bit over a year now. Narrowing goals for treatment. Barriers to improvement  Would like to address baseline depression and meth use.   First sought treatment: in late childhood. Saw therapy at age 8 and 11-also had psych meds and stopped psychotherapist around 14 and continued medsf ora few years. Therapisy in mid 20s.   Since then recovery at 24. Substance abuse treatment centers. Before franc[facility] in DBT.   Couple of hospitalizations:   First at 11: 3 day thing.   Inpatient 12 (3 day eval after pretending suidicde attempt) and one at 13 (SIB).   Has community support. Closest friends in [facility] in active use.   Boyfriend in recovery   Had a sponsor and was fired.      Sid Miranda is a 28 year old White Not  or  male presenting for psychiatric evaluation and medication management. Information is obtained from patient and available records.  Reports history of    Generalized anxiety disorder  Depression, unspecified depression type  Post-traumatic stress disorder  Severe stimulant use disorder (H)   Previously psychiatrically hospitalized during adolescence various times and chemical dependency treatment. History of IOP as well.  Hx of suicidal ideation and attempts.   Hx of  self-injurious behaviors in the form of cutting starting at age middle school.  Genetically loaded for  substance use, schizophrenia. Grew up in an intact home with all basic needs being met.     Sid was seen today for consult.     His stimulant use anxiety and depression are not controlled it is reassuring he is in therapy is currently working with therapist for harm reduction with stimulant use disorder.  I am prescribing Lexapro 5 mg to address anxiety and depression to better overall mood.  Follow-up in 1 month.      Today: 6/27/24:      Started lexapro briefly, get in next week with primary, imitrex prescribed for migraines no more episodes, end of that week. Use episide for one day. Then over. Restarted lexapro for 3 weeks. Spoke with bredna. Increase depression. Couple other variables: Money issues and other variables impacting depression. Interested in naltrexone for substance use.  Sid Miranda is a 28 year old White Not  or  male presenting for psychiatric evaluation and medication management. Information is obtained from patient and available records.  Reports history of    Generalized anxiety disorder  Depression, unspecified depression type  Post-traumatic stress disorder  Severe stimulant use disorder (H)   Previously psychiatrically hospitalized during adolescence various times and chemical dependency treatment. History of IOP as well.  Hx of suicidal ideation and attempts.   Hx of self-injurious behaviors in the form of cutting starting at age middle school.  Genetically loaded for  substance use, schizophrenia. Grew up in an intact home with all basic needs being met.     Sid was seen today for consult.     His stimulant use anxiety and depression are not controlled it is reassuring he is in therapy is currently working with therapist for harm reduction with stimulant use disorder.  I am prescribing Lexapro 5 mg to address anxiety and depression to better overall mood.  Follow-up in  1 month.   Diagnoses and all orders for this visit:     6/27/24:  Sid was seen today for recheck.  Prescribed naltrexone for stimulant use. Advised him he may experience nausea and gave him zofran as needed. Increased lexapro to 10mg to address depression and anxiety. Spent time talking about safety due to passive thoughts of suicidal ideation and talked about resources if he needed them. Follow up in a month.     Today: 7/11/24: new dose of lexapro is fine. Naltrexone notes no side effects.   Reports suicidal thoughts. Most days have been better. Today, lower mood kind of tired. Contracts for safety.    FAMILY, MEDICAL, SURGICAL HISTORY REVIEWED.  MEDICATION HAVE BEEN REVIEWED AND ARE CURRENT TO THE BEST OF MY KNOWLEDGE AND ABILITY.    MEDICATIONS                                                                                                Current Outpatient Medications   Medication Sig Dispense Refill    escitalopram (LEXAPRO) 10 MG tablet Take 1 tablet (10 mg) by mouth daily 30 tablet 0    naltrexone (DEPADE/REVIA) 50 MG tablet Take 1 tablet (50 mg) by mouth daily 30 tablet 0    ondansetron (ZOFRAN ODT) 4 MG ODT tab Take 1 tablet (4 mg) by mouth every 8 hours as needed for nausea 30 tablet 0     No current facility-administered medications for this visit.         PSYCHOTROPIC DRUG INTERACTIONS                                         Lexapro and zofran: increased QTC interval.     MANAGEMENT:  routine monitoring    TODAY PATIENT REPORTS THE FOLLOWING PSYCHIATRIC ROS:   EXERCISE: Adequate  SIDE EFFECTS:  tolerating medications without reported side effects  COMPLIANCE:  states Adherent to medication regimen  REPORTS THE FOLLOWING NEW MEDICAL ISSUES:  none    PROBLEM: DEPRESSION: Improving        No data to display                   No data to display              PHQ9 score is Not completed today  Suicidal ideation:  Yes contracts for safety, no intent or plan.       PROBLEM: ANXIETY: Worsening.   GAD7 score is  "Not completed today      4/4/2023     8:55 AM   LORENZO-7 SCORE   Total Score 6 (mild anxiety)   Total Score 6    6       PROBLEM: CHRONIC SUICIDAL IDEATIONS: current: No     PROBLEM: SLEEP/INSOMNIA: No change  .     PERTINENT PAST MEDICAL AND SURGICAL HISTORY   No past medical history on file.    VITALS     BP Readings from Last 1 Encounters:   05/22/24 119/58     Pulse Readings from Last 1 Encounters:   05/22/24 68     Wt Readings from Last 1 Encounters:   07/11/24 81.6 kg (180 lb)     Ht Readings from Last 1 Encounters:   07/11/24 1.829 m (6')     Estimated body mass index is 24.41 kg/m  as calculated from the following:    Height as of this encounter: 1.829 m (6').    Weight as of this encounter: 81.6 kg (180 lb).    LABS & IMAGING                                                                                                                No labs today.   No lab results found.  No lab results found.  No lab results found.  No lab results found.  No results found for: \"LQS517\", \"PUIQ747\", \"EYRX66AMBEW\", \"VITD3\", \"D2VIT\", \"D3VIT\", \"DTOT\", \"SL41370655\", \"[id number]\", \"[id number]\", \"[id number]\", \"[id number]\", \"[id number]\"     ALLERGY & IMMUNIZATIONS     No Known Allergies    MEDICAL REVIEW OF SYSTEMS:   Ten system review was completed with pertinent positives noted     MENTAL STATUS EXAM:   General/Constitutional:  Appearance:  awake, alert, adequately groomed, appeared stated age and no apparent distress  Attitude:   cooperative   Eye Contact:  good  Musculoskeletal:  Psychomotor Behavior:  no evidence of tardive dyskinesia, dystonia, or tics from the head up  Psychiatric:  Speech:  clear, coherent, regular rate, rhythm, and volume,  No pressure speech noted.  Associations:  no loose associations  Thought Process:  logical, linear and goal oriented  Thought Content:   Evidence of suicidal ideation, no evidence for homicidal ideation, no evidence of psychotic thought, no auditory hallucinations present and no visual " hallucinations present  Mood:  anxious, sad , and depressed  Affect:  restricted (limited variability of emotion during exam) and was congruent to speech content.  Insight:  good  Judgment:  fair, adequate for safety  Impulse Control:  intact  Neurological:  Oriented to:  person, place, time, and situation  Attention Span and Concentration:  Able to attend to the interview     Language: intact    Recent and Remote Memory:  Intact to interview. Not formally assessed. No amnesia.   Fund of Knowledge: appropriate        DSM 5 DIAGNOSIS:      Generalized anxiety disorder  Severe stimulant use disorder (H)      MEDICAL COMORBIDITY IMPACTING CLINICAL PICTURE: None noted.  Known issue that I take into account for their medical decisions, no current exacerbations or new concerns      ASSESSMENT AND PLAN    Sid was seen today for recheck.  Provied refills of medications. Offered to increase lexapro to 15mg due to continued depressive symptoms. Anxiety not controlled. Follow up in a month.   Diagnoses and all orders for this visit:    Generalized anxiety disorder  -     escitalopram (LEXAPRO) 10 MG tablet; Take 1 tablet (10 mg) by mouth daily    Severe stimulant use disorder (H)  -     naltrexone (DEPADE/REVIA) 50 MG tablet; Take 1 tablet (50 mg) by mouth daily           HIGH RISK MEDICATION:  No       ADMINISTRATIVE BILLING:   The longitudinal plan of care for the diagnosis(es)/condition(s) as documented were addressed during this visit. Due to the added complexity in care, I will continue to support Sid in the subsequent management and with ongoing continuity of care.       Patient Status:  Patient will continue to be seen for ongoing consultation and stabilization.    Signed:   Neville West PA-C

## 2024-07-11 NOTE — NURSING NOTE
Current patient location: [place] [address]  [address] [facility] [address]    Is the patient currently in the state of [place]? YES    Visit mode:VIDEO    If the visit is dropped, the patient can be reconnected by: VIDEO VISIT: Send to e-mail at: [email or web address]    Will anyone else be joining the visit? NO  (If patient encounters technical issues they should call [phone]    How would you like to obtain your AVS? MyChart    Are changes needed to the allergy or medication list? No    Are refills needed on medications prescribed by this physician? NO    Reason for visit: RECHECK    Emilie CARLTON

## 2024-07-16 ENCOUNTER — VIRTUAL VISIT (OUTPATIENT)
Dept: PSYCHOLOGY | Facility: CLINIC | Age: 29
End: 2024-07-16
Payer: COMMERCIAL

## 2024-07-16 ENCOUNTER — MYC MEDICAL ADVICE (OUTPATIENT)
Dept: PSYCHIATRY | Facility: CLINIC | Age: 29
End: 2024-07-16
Payer: COMMERCIAL

## 2024-07-16 DIAGNOSIS — F15.20 SEVERE STIMULANT USE DISORDER (H): ICD-10-CM

## 2024-07-16 DIAGNOSIS — F33.1 MODERATE EPISODE OF RECURRENT MAJOR DEPRESSIVE DISORDER (H): Primary | ICD-10-CM

## 2024-07-16 DIAGNOSIS — F43.10 POST-TRAUMATIC STRESS DISORDER: ICD-10-CM

## 2024-07-16 DIAGNOSIS — F41.1 GENERALIZED ANXIETY DISORDER: ICD-10-CM

## 2024-07-16 DIAGNOSIS — F32.A DEPRESSION, UNSPECIFIED DEPRESSION TYPE: ICD-10-CM

## 2024-07-16 DIAGNOSIS — F41.1 GENERALIZED ANXIETY DISORDER: Primary | ICD-10-CM

## 2024-07-16 PROCEDURE — 90837 PSYTX W PT 60 MINUTES: CPT | Mod: 95 | Performed by: STUDENT IN AN ORGANIZED HEALTH CARE EDUCATION/TRAINING PROGRAM

## 2024-07-16 NOTE — PROGRESS NOTES
[facility] - Progress Note    Date of Service: 24   Name: Sid Miranda  : 1995  Medical Record Number: [id number]  Treating Provider: Francisco Cannon, Ph.D,    Type of Session: Individual  Present in Session: Client and therapist  Session Start and Stop Time: 10:05am-10:58am  Number of Minutes:  53    SERVICE MODALITY:  Video Visit:      Provider verified identity through the following two step process.  Patient provided:  Patient is known previously to provider and Patient was verified at admission/transfer    Telemedicine Visit: The patient's condition can be safely assessed and treated via synchronous audio and visual telemedicine encounter.      Reason for Telemedicine Visit: Patient has requested telehealth visit    Originating Site (Patient Location): Patient's home    Distant Site (Provider Location): [facility]    Consent:  The patient/guardian has verbally consented to: the potential risks and benefits of telemedicine (video visit) versus in person care; bill my insurance or make self-payment for services provided; and responsibility for payment of non-covered services.     Patient would like the video invitation sent by:  My Chart    Mode of Communication:  Video Conference via [employer]    Distant Location (Provider):  On-site    As the provider I attest to compliance with applicable laws and regulations related to telemedicine.      DSM-5 Diagnoses:  Encounter Diagnoses   Name Primary?    Moderate episode of recurrent major depressive disorder (H) Yes    Generalized anxiety disorder     Post-traumatic stress disorder     Severe stimulant use disorder (H)          Current Reported Symptoms and Status update:  Changes since last session include improvement in depression and mood, no recent thoughts of death or SI, increased Lexapro to 10mg per day and considering going up to 15mg per recommendations from his psychiatry  provider, started taking Naltrexone, improvement in anxiety and trauma-related activation. No recent meth use.    Post Traumatic Stress Disorder: Client reported a history of traumatic events meeting criteria for diagnosis. Client experiences recurrent memories, troubling dreams, physiological reactivity in response to memories/reminders of traumatic events, and trauma-related avoidance.    Generalized Anxiety Disorder: Excessive anxiety and worry about a number of events or activities (such as work or school performance); difficulty controlling worry; restlessness or feeling keyed up or on edge; difficulty concentrating or mind going blank; irritability.      Depression: Client reports hopelessness, depressed mood, irritability, anhedonia, low self-worth, isolation, fatigue, poor sleep, decreased functioning.      Severe Stimulant Use Disorder: Client has a history of methamphetamine use that significantly negatively impacts functioning, including financial stress, loss of employment, and loss of relationships. He continued to use despite negative consequences. Client has a history of sexual behavior he describes as compulsive when using meth. Further assessment needed to determine if compulsive sexual behaviors also occur separately from meth use.     Progress Toward Treatment Goals:   Satisfactory progress     Therapeutic Interventions/Treatment Strategies:    Area(s) of treatment focus addressed in this session included Symptom Management, Interpersonal Relationship Skills, and Sexual Health and Wellness    Client shared that he is working with his psychiatry provider, Neftali West, at this clinic to adjust medications. He is considering quitting nicotine secondary to nausea from Naltrexone, although he is exploring motivation to quit. Client reported improvement in mood and SI (denied SI, thoughts, intent, and plan). Client processed recent conflict in his relationship related to past meth use episodes. Client  has not used meth in approx a month. Client shared feeling disconnected from his partner but attributed this to changes in his partner's work. Client processed recent sociocultural stressors and ways in which he is using them as motivation to stop meth use.     Psychotherapist offered support, feedback and validation and reinforced use of skills Treatment modalities used include Cognitive Behavioral Therapy  Support and Feedback    Patient Response:   Patient responded to session by accepting feedback, giving feedback, listening, focusing on goals, and accepting support  Possible barriers to participation / learning include: N/A    Current Mental Status Exam:   Appearance:  Appropriate   Eye Contact:  Good   Attitude / Demeanor: Cooperative   Speech      Rate / Production: Normal/ Responsive      Volume:  Normal  volume  Orientation:  All  Mood:   Normal  Affect:   Appropriate   Thought Content: Clear   Insight:   Good       Plan/Need for Future Services:  Return for therapy in 1-2 weeks to treat diagnosed problems.    Patient has a current master individualized treatment plan.  See Epic treatment plan for more information.    Referral / Collaboration:  Referral to another professional/service is not indicated at this time..  Emergency Services Needed?  No    Assignment:  Self-care  Explore motivation to stop nicotine use    Interactive Complexity:  There are four specific communication difficulties that complicate the work of the primary psychiatric procedure.  Interactive complexity (+[id number]) may be reported when at least one of these difficulties is present.    Communication difficulties present during current the psychiatric procedure include:  None.      Signature/Title:    Francisco Cannon, Ph.D,

## 2024-07-16 NOTE — NURSING NOTE
Is the patient currently in the state of [place]? YES    Current patient location: [place] [id number] [facility] [address]    Visit mode:VIDEO    If the visit is dropped, the patient can be reconnected by: VIDEO VISIT:  Send e-mail to at [email or web address]    Will anyone else be joining the visit? No  (If patient encounters technical issues they should call [phone])    How would you like to obtain your AVS? MyChart    Are changes needed to the allergy or medication list? N/A    Are refills needed on medications prescribed by this physician? NO    Rooming Documentation: Questionnaire(s) not done per department protocol.    Reason for visit: RECHBIANKA Sotelo

## 2024-07-18 RX ORDER — ESCITALOPRAM OXALATE 5 MG/1
5 TABLET ORAL DAILY
Qty: 30 TABLET | Refills: 0 | Status: SHIPPED | OUTPATIENT
Start: 2024-07-18 | End: 2024-08-15

## 2024-07-23 ENCOUNTER — VIRTUAL VISIT (OUTPATIENT)
Dept: PSYCHOLOGY | Facility: CLINIC | Age: 29
End: 2024-07-23
Payer: COMMERCIAL

## 2024-07-23 DIAGNOSIS — F15.20 SEVERE STIMULANT USE DISORDER (H): ICD-10-CM

## 2024-07-23 DIAGNOSIS — F33.1 MODERATE EPISODE OF RECURRENT MAJOR DEPRESSIVE DISORDER (H): Primary | ICD-10-CM

## 2024-07-23 DIAGNOSIS — F41.1 GENERALIZED ANXIETY DISORDER: ICD-10-CM

## 2024-07-23 PROCEDURE — 90837 PSYTX W PT 60 MINUTES: CPT | Mod: 95 | Performed by: STUDENT IN AN ORGANIZED HEALTH CARE EDUCATION/TRAINING PROGRAM

## 2024-07-23 NOTE — NURSING NOTE
Is the patient currently in the state of [place]? YES    Current patient location: [place] [address] [facility] [address]    Visit mode:VIDEO    If the visit is dropped, the patient can be reconnected by: VIDEO VISIT:  Send e-mail to at [email or web address]    Will anyone else be joining the visit? No  (If patient encounters technical issues they should call [phone])    How would you like to obtain your AVS? MyChart    Are changes needed to the allergy or medication list? N/A    Are refills needed on medications prescribed by this physician? NO    Rooming Documentation: Questionnaire(s) not done per department protocol.    Reason for visit: RECHBIANKA Sotelo

## 2024-07-23 NOTE — PROGRESS NOTES
[facility] - Progress Note    Date of Service: 24   Name: Sid Miranda  : 1995  Medical Record Number: [id number]  Treating Provider: Francisco Cannon, PhD  Type of Session: Individual  Present in Session: Client and therapist  Session Start and Stop Time: 11:05am-11:52am  Number of Minutes:  47    SERVICE MODALITY:  Video Visit:      Provider verified identity through the following two step process.  Patient provided:  Patient is known previously to provider and Patient was verified at admission/transfer    Telemedicine Visit: The patient's condition can be safely assessed and treated via synchronous audio and visual telemedicine encounter.      Reason for Telemedicine Visit: Patient has requested telehealth visit    Originating Site (Patient Location): Patient's home    Distant Site (Provider Location): [facility]    Consent:  The patient/guardian has verbally consented to: the potential risks and benefits of telemedicine (video visit) versus in person care; bill my insurance or make self-payment for services provided; and responsibility for payment of non-covered services.     Patient would like the video invitation sent by:  My Chart    Mode of Communication:  Video Conference via [employer]    Distant Location (Provider):  On-site    As the provider I attest to compliance with applicable laws and regulations related to telemedicine.      DSM-5 Diagnoses:  Encounter Diagnoses   Name Primary?    Moderate episode of recurrent major depressive disorder (H) Yes    Generalized anxiety disorder     Severe stimulant use disorder (H)          Current Reported Symptoms and Status update:  Changes since last session includes improvement in depression and anxiety, exploring new jobs that align with his goals and values, no recent meth use, no recent trauma activation.    Post Traumatic Stress Disorder: Client reported a history of traumatic events  "meeting criteria for diagnosis. Client experiences recurrent memories, troubling dreams, physiological reactivity in response to memories/reminders of traumatic events, and trauma-related avoidance.    Generalized Anxiety Disorder: Excessive anxiety and worry about a number of events or activities (such as work or school performance); difficulty controlling worry; restlessness or feeling keyed up or on edge; difficulty concentrating or mind going blank; irritability.      Depression: Client reports hopelessness, depressed mood, irritability, anhedonia, low self-worth, isolation, fatigue, poor sleep, decreased functioning.      Severe Stimulant Use Disorder: Client has a history of methamphetamine use that significantly negatively impacts functioning, including financial stress, loss of employment, and loss of relationships. He continued to use despite negative consequences. Client has a history of sexual behavior he describes as compulsive when using meth. Further assessment needed to determine if compulsive sexual behaviors also occur separately from meth use.     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 Client and therapist started addressing client's concerns about body image and functioning, and how these concerns activate depression and hopelessness. Therapist supported client to identify self-perceived neutral, positive, and negative attributes of his body. Client identified how these connect to feelings of depression. We planned to continue exploring these attributes and his \"frame of reference\" for comparison. Assigned client to continue identifying neutral and positive " attributes.    Psychotherapist offered support, feedback and validation and reinforced use of skills Treatment modalities used include Cognitive Behavioral Therapy  Support and Feedback    Patient Response:   Patient responded to session by accepting feedback, giving feedback, listening, focusing on goals, and accepting support  Possible barriers to participation / learning include: N/A    Current Mental Status Exam:   Appearance:  Appropriate   Eye Contact:  Good   Attitude / Demeanor: Cooperative   Speech      Rate / Production: Normal/ Responsive      Volume:  Normal  volume  Orientation:  All  Mood:   Normal  Affect:   Appropriate   Thought Content: Clear   Insight:   Good       Plan/Need for Future Services:  Return for therapy in 2 weeks to treat diagnosed problems.    Patient has a current master individualized treatment plan.  See Epic treatment plan for more information.    Referral / Collaboration:  Referral to another professional/service is not indicated at this time..  Emergency Services Needed?  No    Assignment:  Neutral and positive attributes    Interactive Complexity:  There are four specific communication difficulties that complicate the work of the primary psychiatric procedure.  Interactive complexity (+63692) may be reported when at least one of these difficulties is present.    Communication difficulties present during current the psychiatric procedure include:  None.      Signature/Title:    Francisco Cannon, PhD, LP    [facility], Sexual and [facility] Clinic  Department of Family Medicine and Community Health  [employer]

## 2024-08-01 ENCOUNTER — VIRTUAL VISIT (OUTPATIENT)
Dept: PSYCHOLOGY | Facility: CLINIC | Age: 29
End: 2024-08-01
Payer: COMMERCIAL

## 2024-08-01 DIAGNOSIS — F33.1 MODERATE EPISODE OF RECURRENT MAJOR DEPRESSIVE DISORDER (H): Primary | ICD-10-CM

## 2024-08-01 DIAGNOSIS — F41.1 GENERALIZED ANXIETY DISORDER: ICD-10-CM

## 2024-08-01 DIAGNOSIS — F43.10 POST-TRAUMATIC STRESS DISORDER: ICD-10-CM

## 2024-08-01 DIAGNOSIS — F15.20 SEVERE STIMULANT USE DISORDER (H): ICD-10-CM

## 2024-08-01 PROCEDURE — 90837 PSYTX W PT 60 MINUTES: CPT | Mod: 95 | Performed by: STUDENT IN AN ORGANIZED HEALTH CARE EDUCATION/TRAINING PROGRAM

## 2024-08-01 NOTE — PROGRESS NOTES
[facility] Sexual [facility] Gender [facility] - Progress Note    Date of Service: 24   Name: Sid Miranda  : 1995  Medical Record Number: [id number]  Treating Provider: Francisco Cannon, Ph.D,    Type of Session: Individual  Present in Session: Client and therapist  Session Start and Stop Time: 11:05am-11:58am  Number of Minutes:  53    SERVICE MODALITY:  Video Visit:      Provider verified identity through the following two step process.  Patient provided:  Patient is known previously to provider and Patient was verified at admission/transfer    Telemedicine Visit: The patient's condition can be safely assessed and treated via synchronous audio and visual telemedicine encounter.      Reason for Telemedicine Visit: Patient has requested telehealth visit    Originating Site (Patient Location): Patient's home    Distant Site (Provider Location): [facility]    Consent:  The patient/guardian has verbally consented to: the potential risks and benefits of telemedicine (video visit) versus in person care; bill my insurance or make self-payment for services provided; and responsibility for payment of non-covered services.     Patient would like the video invitation sent by:  My Chart    Mode of Communication:  Video Conference via [employer]    Distant Location (Provider):  On-site    As the provider I attest to compliance with applicable laws and regulations related to telemedicine.      DSM-5 Diagnoses:  Encounter Diagnoses   Name Primary?    Moderate episode of recurrent major depressive disorder (H) Yes    Generalized anxiety disorder     Severe stimulant use disorder (H)     Post-traumatic stress disorder          Current Reported Symptoms and Status update:  Changes since last session includes some improvement in depression, exploring sources of negative beliefs, improvement in anxiety, exploring work opportunities and recent job interview, recent meth use episode 2 weeks  "ago not previously disclosed. Described use as more mild.    Post Traumatic Stress Disorder: Client reported a history of traumatic events meeting criteria for diagnosis. Client experiences recurrent memories, troubling dreams, physiological reactivity in response to memories/reminders of traumatic events, and trauma-related avoidance.    Generalized Anxiety Disorder: Excessive anxiety and worry about a number of events or activities (such as work or school performance); difficulty controlling worry; restlessness or feeling keyed up or on edge; difficulty concentrating or mind going blank; irritability.      Depression: Client reports hopelessness, depressed mood, irritability, anhedonia, low self-worth, isolation, fatigue, poor sleep, decreased functioning.      Severe Stimulant Use Disorder: Client has a history of methamphetamine use that significantly negatively impacts functioning, including financial stress, loss of employment, and loss of relationships. He continued to use despite negative consequences. Client has a history of sexual behavior he describes as compulsive when using meth. Further assessment needed to determine if compulsive sexual behaviors also occur separately from meth use.     Progress Toward Treatment Goals:   Satisfactory progress     Therapeutic Interventions/Treatment Strategies:    Area(s) of treatment focus addressed in this session included Symptom Management    Client shared recent stressors and new opportunities for work. He discussed a recent meth use episode and reported that it was milder, shorter in duration, and did not have significant negative outcomes. We continued work on exploring body image concerns. He noted beliefs about how his body should appear and function and connected this with his yoga practice and implicit beliefs about \"better body, better person.\" We explored other values systems in yoga, including practice, acceptance, and self-compassion. Client recognized " "incongruence in his beliefs and attempted to reconcile them. Therapist encouraged client to notice the incongruence and connected this as a source of distress. Encouraged client to consider other beliefs he may be automatically reacting to regarding his body. Client appreciated \"slowing down\" to discover automatic beliefs and reactions. He briefly shared a series of dreams.     Psychotherapist offered support, feedback and validation and reinforced use of skills Treatment modalities used include Cognitive Behavioral Therapy  Support and Feedback    Patient Response:   Patient responded to session by accepting feedback, giving feedback, listening, focusing on goals, and accepting support  Possible barriers to participation / learning include: N/A    Current Mental Status Exam:   Appearance:  Appropriate   Eye Contact:  Good   Attitude / Demeanor: Cooperative   Speech      Rate / Production: Normal/ Responsive      Volume:  Normal  volume  Orientation:  All  Mood:   Normal  Affect:   Appropriate   Thought Content: Clear   Insight:   Good       Plan/Need for Future Services:  Return for therapy in 1 week to treat diagnosed problems.    Patient has a current master individualized treatment plan.  See Epic treatment plan for more information.    Referral / Collaboration:  Referral to another professional/service is not indicated at this time..  Emergency Services Needed?  No    Assignment:  Log automatic thoughts/reactions/beliefs re: body image    Interactive Complexity:  There are four specific communication difficulties that complicate the work of the primary psychiatric procedure.  Interactive complexity (+18903) may be reported when at least one of these difficulties is present.    Communication difficulties present during current the psychiatric procedure include:  None.      Signature/Title:      Francisco Cannon, PhD, LP    [facility], Sexual and Gender Health " Clinic  Department of Family Medicine and Community Health  [facility]

## 2024-08-01 NOTE — NURSING NOTE
Is the patient currently in the state of [place]? YES    Current patient location: [place] [address]  [address] [facility] [address]    Visit mode:VIDEO    If the visit is dropped, the patient can be reconnected by: VIDEO VISIT: Text to cell phone:   Telephone Information:   Mobile [phone]       Will anyone else be joining the visit? No  (If patient encounters technical issues they should call [phone])    How would you like to obtain your AVS? MyChart    Are changes needed to the allergy or medication list? No    Rooming Documentation: Questionnaire(s) not done per department protocol.    Reason for visit: BIANKA Whitfield

## 2024-08-13 ENCOUNTER — VIRTUAL VISIT (OUTPATIENT)
Dept: PSYCHOLOGY | Facility: CLINIC | Age: 29
End: 2024-08-13
Payer: COMMERCIAL

## 2024-08-13 DIAGNOSIS — F41.1 GENERALIZED ANXIETY DISORDER: ICD-10-CM

## 2024-08-13 DIAGNOSIS — F15.20 SEVERE STIMULANT USE DISORDER (H): ICD-10-CM

## 2024-08-13 DIAGNOSIS — F43.10 POST-TRAUMATIC STRESS DISORDER: ICD-10-CM

## 2024-08-13 DIAGNOSIS — F33.1 MODERATE EPISODE OF RECURRENT MAJOR DEPRESSIVE DISORDER (H): Primary | ICD-10-CM

## 2024-08-13 PROCEDURE — 90837 PSYTX W PT 60 MINUTES: CPT | Mod: 95 | Performed by: STUDENT IN AN ORGANIZED HEALTH CARE EDUCATION/TRAINING PROGRAM

## 2024-08-13 NOTE — NURSING NOTE
Current patient location: [place] [address]  [address] [facility] [address]    Is the patient currently in the state of [place]? YES    Visit mode:VIDEO    If the visit is dropped, the patient can be reconnected by: VIDEO VISIT: Send to e-mail at: [email or web address]    Will anyone else be joining the visit? NO  (If patient encounters technical issues they should call [phone])    How would you like to obtain your AVS? MyChart    Are changes needed to the allergy or medication list? No    Are refills needed on medications prescribed by this physician? NO    Rooming Documentation:  Questionnaire(s) not done per department protocol      Reason for visit: RECHECK    Mildred CARLTON

## 2024-08-13 NOTE — PROGRESS NOTES
[facility] for Sexual and Gender Health - Progress Note    Date of Service: 24   Name: Sid Miranda  : 1995  Medical Record Number: [id number]  Treating Provider: Francisco Cannon, Ph.D,   Type of Session: Individual  Present in Session: Client and therapist  Session Start and Stop Time: 11:05am-12:00pm  Number of Minutes:  55    SERVICE MODALITY:  Video Visit:      Provider verified identity through the following two step process.  Patient provided:  Patient is known previously to provider and Patient was verified at admission/transfer    Telemedicine Visit: The patient's condition can be safely assessed and treated via synchronous audio and visual telemedicine encounter.      Reason for Telemedicine Visit: Patient has requested telehealth visit    Originating Site (Patient Location): Patient's home    Distant Site (Provider Location): [facility]    Consent:  The patient/guardian has verbally consented to: the potential risks and benefits of telemedicine (video visit) versus in person care; bill my insurance or make self-payment for services provided; and responsibility for payment of non-covered services.     Patient would like the video invitation sent by:  My Chart    Mode of Communication:  Video Conference via [employer]    Distant Location (Provider):  On-site    As the provider I attest to compliance with applicable laws and regulations related to telemedicine.      DSM-5 Diagnoses:  Encounter Diagnoses   Name Primary?    Moderate episode of recurrent major depressive disorder (H) Yes    Generalized anxiety disorder     Severe stimulant use disorder (H)     Post-traumatic stress disorder          Current Reported Symptoms and Status update:  Changes since last session includes recently accepting a new work position, improvement in depression and anxiety, reduced trauma-related activation, recent and brief meth-use episode this past Saturday.    Post  Traumatic Stress Disorder: Client reported a history of traumatic events meeting criteria for diagnosis. Client experiences recurrent memories, troubling dreams, physiological reactivity in response to memories/reminders of traumatic events, and trauma-related avoidance.    Generalized Anxiety Disorder: Excessive anxiety and worry about a number of events or activities (such as work or school performance); difficulty controlling worry; restlessness or feeling keyed up or on edge; difficulty concentrating or mind going blank; irritability.      Depression: Client reports hopelessness, depressed mood, irritability, anhedonia, low self-worth, isolation, fatigue, poor sleep, decreased functioning.      Severe Stimulant Use Disorder: Client has a history of methamphetamine use that significantly negatively impacts functioning, including financial stress, loss of employment, and loss of relationships. He continued to use despite negative consequences. Client has a history of sexual behavior he describes as compulsive when using meth. Further assessment needed to determine if compulsive sexual behaviors also occur separately from meth use.     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 We reflected on how client is expressing excitement and opportunity rather than feeling stuck in his work. We also discussed client's improvement in treatment. He expressed passions, goals, and dreams aligned with his values. We noted improvement in depression and ways in which he is able to consider future options. He processed recent conflict with his partner Tip leading to a brief meth-use episode. We identified  core beliefs that were activated and ways in which he can engage more effective coping strategies. He identified several from prior DBT treatment that he plans to practice daily.    Psychotherapist offered support, feedback and validation and reinforced use of skills Treatment modalities used include Cognitive Behavioral Therapy  Support and Feedback    Patient Response:   Patient responded to session by accepting feedback, giving feedback, listening, focusing on goals, and accepting support  Possible barriers to participation / learning include: N/A    Current Mental Status Exam:   Appearance:  Appropriate   Eye Contact:  Good   Attitude / Demeanor: Cooperative   Speech      Rate / Production: Normal/ Responsive      Volume:  Normal  volume  Orientation:  All  Mood:   Normal  Affect:   Appropriate   Thought Content: Clear   Insight:   Good       Plan/Need for Future Services:  Return for therapy in 2 weeks to treat diagnosed problems.    Patient has a current master individualized treatment plan.  See Epic treatment plan for more information.    Referral / Collaboration:  Referral to another professional/service is not indicated at this time..  Emergency Services Needed?  No    Assignment:  Practice DBT skills    Interactive Complexity:  There are four specific communication difficulties that complicate the work of the primary psychiatric procedure.  Interactive complexity (+01436) may be reported when at least one of these difficulties is present.    Communication difficulties present during current the psychiatric procedure include:  None.      Signature/Title:    Francisco Cannon, PhD, LP    [facility], Sexual and Gender Health Clinic  Department of Family Medicine and Community Health  [employer]

## 2024-08-15 ENCOUNTER — TELEPHONE (OUTPATIENT)
Dept: PSYCHIATRY | Facility: CLINIC | Age: 29
End: 2024-08-15
Payer: COMMERCIAL

## 2024-08-15 DIAGNOSIS — F32.A DEPRESSION, UNSPECIFIED DEPRESSION TYPE: ICD-10-CM

## 2024-08-15 DIAGNOSIS — F43.10 POST-TRAUMATIC STRESS DISORDER: ICD-10-CM

## 2024-08-15 DIAGNOSIS — F15.20 SEVERE STIMULANT USE DISORDER (H): ICD-10-CM

## 2024-08-15 DIAGNOSIS — F41.1 GENERALIZED ANXIETY DISORDER: ICD-10-CM

## 2024-08-15 RX ORDER — ESCITALOPRAM OXALATE 10 MG/1
10 TABLET ORAL DAILY
Qty: 30 TABLET | Refills: 0 | Status: SHIPPED | OUTPATIENT
Start: 2024-08-15 | End: 2024-08-22

## 2024-08-15 RX ORDER — ESCITALOPRAM OXALATE 5 MG/1
5 TABLET ORAL DAILY
Qty: 30 TABLET | Refills: 0 | Status: SHIPPED | OUTPATIENT
Start: 2024-08-15 | End: 2024-08-22

## 2024-08-15 RX ORDER — NALTREXONE HYDROCHLORIDE 50 MG/1
50 TABLET, FILM COATED ORAL DAILY
Qty: 30 TABLET | Refills: 0 | Status: SHIPPED | OUTPATIENT
Start: 2024-08-15 | End: 2024-08-22

## 2024-08-15 NOTE — TELEPHONE ENCOUNTER
[facility] Call Center    Phone Message    May a detailed message be left on voicemail: no     Reason for Call: Medication Refill Request    Has the patient contacted the pharmacy for the refill? Yes   Name of medication being requested:   Lexapro (5mg and 10 mg)  Naltrexone  Provider who prescribed the medication: Neville West  Pharmacy: [facility]    Action Taken: Message routed to:  Other: [facility] nurse [place]    Date of Service: 8/15/2024 Sergio Harry

## 2024-08-15 NOTE — TELEPHONE ENCOUNTER
Date of Last Office Visit: 7/11/2024  Date of Next Office Visit: 8/22/2024  No shows since last visit: 0  Cancellations since last visit: 0    Medication requested: escitalopram (LEXAPRO) 5 MG tablet   Date last ordered: 7/18/2024 Qty: 30 Refills: 0    Medication requested: naltrexone (DEPADE/REVIA) 50 MG tablet  Date last ordered: 7/11/2024 Qty: 30 Refills: 0    Last visit treatment plan: RTC 1 month (Aug 2024)    [x]Medication refilled per  Medication Refill in Ambulatory Care  policy.  []Medication unable to be refilled by RN due to criteria not met as indicated below:    []Eligibility - not seen in the last year   []Supervision - no future appointment   []Compliance - no shows, cancellations or lapse in therapy   []Verification - order discrepancy   []Controlled medication   []Medication not included in policy   []90-day supply request   []Other    RN sent 1 month supply of both medication to  Specialty Pharm[facility] for pt.    Carmen Lai RN on 8/15/2024 at 12:29 PM

## 2024-08-20 ENCOUNTER — VIRTUAL VISIT (OUTPATIENT)
Dept: PSYCHOLOGY | Facility: CLINIC | Age: 29
End: 2024-08-20
Payer: COMMERCIAL

## 2024-08-20 DIAGNOSIS — F41.1 GENERALIZED ANXIETY DISORDER: Primary | ICD-10-CM

## 2024-08-20 DIAGNOSIS — F33.1 MODERATE EPISODE OF RECURRENT MAJOR DEPRESSIVE DISORDER (H): ICD-10-CM

## 2024-08-20 DIAGNOSIS — F43.10 POST-TRAUMATIC STRESS DISORDER: ICD-10-CM

## 2024-08-20 DIAGNOSIS — F15.20 SEVERE STIMULANT USE DISORDER (H): ICD-10-CM

## 2024-08-20 PROCEDURE — 90834 PSYTX W PT 45 MINUTES: CPT | Mod: 95 | Performed by: STUDENT IN AN ORGANIZED HEALTH CARE EDUCATION/TRAINING PROGRAM

## 2024-08-20 NOTE — PROGRESS NOTES
[facility] - Progress Note    Date of Service: 24   Name: Sid Miranda  : 1995  Medical Record Number: [id number]  Treating Provider: Francisco Cannon, Ph.D,    Type of Session: Individual  Present in Session: Client and therapist  Session Start and Stop Time: 11:02am-11:50am  Number of Minutes:  48    SERVICE MODALITY:  Video Visit:      Provider verified identity through the following two step process.  Patient provided:  Patient is known previously to provider and Patient was verified at admission/transfer    Telemedicine Visit: The patient's condition can be safely assessed and treated via synchronous audio and visual telemedicine encounter.      Reason for Telemedicine Visit: Patient has requested telehealth visit    Originating Site (Patient Location): Patient's home    Distant Site (Provider Location): [facility]    Consent:  The patient/guardian has verbally consented to: the potential risks and benefits of telemedicine (video visit) versus in person care; bill my insurance or make self-payment for services provided; and responsibility for payment of non-covered services.     Patient would like the video invitation sent by:  My Chart    Mode of Communication:  Video Conference via [employer]    Distant Location (Provider):  On-site    As the provider I attest to compliance with applicable laws and regulations related to telemedicine.      DSM-5 Diagnoses:  Encounter Diagnoses   Name Primary?    Generalized anxiety disorder Yes    Moderate episode of recurrent major depressive disorder (H)     Post-traumatic stress disorder     Severe stimulant use disorder (H)          Current Reported Symptoms and Status update:  Changes since last session include ending his work at [employer] this Friday and plans to start a new job at his [employer] complex next Tuesday; anxiety and uncertainty about potential changes in his new work; practicing  reframing and challenging negative thinking; reduced PTSD-related activation; feeling increase in optimism and reduction in depression; recently increased Lexapro; continuing recovery and attending 12-step meetings.    Post Traumatic Stress Disorder: Client reported a history of traumatic events meeting criteria for diagnosis. Client experiences recurrent memories, troubling dreams, physiological reactivity in response to memories/reminders of traumatic events, and trauma-related avoidance.    Generalized Anxiety Disorder: Excessive anxiety and worry about a number of events or activities (such as work or school performance); difficulty controlling worry; restlessness or feeling keyed up or on edge; difficulty concentrating or mind going blank; irritability.      Depression: Client reports hopelessness, depressed mood, irritability, anhedonia, low self-worth, isolation, fatigue, poor sleep, decreased functioning.      Severe Stimulant Use Disorder: Client has a history of methamphetamine use that significantly negatively impacts functioning, including financial stress, loss of employment, and loss of relationships. He continued to use despite negative consequences. Client has a history of sexual behavior he describes as compulsive when using meth. Further assessment needed to determine if compulsive sexual behaviors also occur separately from meth use.     Progress Toward Treatment Goals:   Satisfactory progress     Therapeutic Interventions/Treatment Strategies:    Area(s) of treatment focus addressed in this session included Symptom Management, Interpersonal Relationship Skills, and Sexual Health and Wellness    Client processed anxiety/worry related to a potential change in future coworkers in his new job. He recognized thinking patterns contributing to some anxiety. Therapist provided validation and support. He shared how he is engaging social support. Therapist inquired about his relationship with CASSIE, and  client shared how his friendship with CASSIE has evolved/transitioned over time. He shared ways in which he and Tip have been connecting. He also shared updates in recovery, attending meetings, and plans for structuring recovery with his new schedule. He reflected on overall improvement in depression and continued recovery. We discussed client being gentle/self-compassionate as he changes schedule, routines, and jobs.    Psychotherapist offered support, feedback and validation and reinforced use of skills Treatment modalities used include Cognitive Behavioral Therapy  Support, Feedback, and Clarification    Patient Response:   Patient responded to session by accepting feedback, giving feedback, listening, focusing on goals, and accepting support  Possible barriers to participation / learning include: N/A    Current Mental Status Exam:   Appearance:  Appropriate   Eye Contact:  Good   Attitude / Demeanor: Friendly  Speech      Rate / Production: Normal/ Responsive      Volume:  Normal  volume  Orientation:  All  Mood:   Normal  Affect:   Appropriate   Thought Content: Clear   Insight:   Good         Plan/Need for Future Services:  Return for therapy in 1-2 weeks to treat diagnosed problems.    Patient has a current master individualized treatment plan.  See Epic treatment plan for more information.    Referral / Collaboration:  Referral to another professional/service is not indicated at this time..  Emergency Services Needed?  No    Assignment:  Self-care  Be gentle with himself during changes in his routine    Interactive Complexity:  There are four specific communication difficulties that complicate the work of the primary psychiatric procedure.  Interactive complexity (+25091) may be reported when at least one of these difficulties is present.    Communication difficulties present during current the psychiatric procedure include:  None.      Signature/Title:    Francisco Cannon, PhD, LP  Assistant  Professor  [facility], [facility]  Department of Family Medicine and Community Health  [facility]

## 2024-08-20 NOTE — NURSING NOTE
Current patient location: [place] [address]  [address] [facility] [address]    Is the patient currently in the state of [place]? YES    Visit mode:VIDEO    If the visit is dropped, the patient can be reconnected by: VIDEO VISIT: Send to e-mail at: [email or web address]    Will anyone else be joining the visit? NO  (If patient encounters technical issues they should call [phone]    How would you like to obtain your AVS? MyChart    Are changes needed to the allergy or medication list? N/A    Are refills needed on medications prescribed by this physician? NO    Rooming Documentation:  Questionnaire(s) not done per department protocol      Reason for visit: RECHECK    Dhruv CARLTON

## 2024-08-22 ENCOUNTER — VIRTUAL VISIT (OUTPATIENT)
Dept: PSYCHIATRY | Facility: CLINIC | Age: 29
End: 2024-08-22
Payer: COMMERCIAL

## 2024-08-22 DIAGNOSIS — F32.A DEPRESSION, UNSPECIFIED DEPRESSION TYPE: ICD-10-CM

## 2024-08-22 DIAGNOSIS — F43.10 POST-TRAUMATIC STRESS DISORDER: ICD-10-CM

## 2024-08-22 DIAGNOSIS — F41.1 GENERALIZED ANXIETY DISORDER: ICD-10-CM

## 2024-08-22 DIAGNOSIS — F15.20 SEVERE STIMULANT USE DISORDER (H): ICD-10-CM

## 2024-08-22 PROCEDURE — 99214 OFFICE O/P EST MOD 30 MIN: CPT | Mod: 95 | Performed by: STUDENT IN AN ORGANIZED HEALTH CARE EDUCATION/TRAINING PROGRAM

## 2024-08-22 PROCEDURE — G2211 COMPLEX E/M VISIT ADD ON: HCPCS | Mod: 95 | Performed by: STUDENT IN AN ORGANIZED HEALTH CARE EDUCATION/TRAINING PROGRAM

## 2024-08-22 RX ORDER — ESCITALOPRAM OXALATE 10 MG/1
10 TABLET ORAL DAILY
Qty: 30 TABLET | Refills: 0 | Status: SHIPPED | OUTPATIENT
Start: 2024-08-22

## 2024-08-22 RX ORDER — NALTREXONE HYDROCHLORIDE 50 MG/1
50 TABLET, FILM COATED ORAL DAILY
Qty: 30 TABLET | Refills: 0 | Status: SHIPPED | OUTPATIENT
Start: 2024-08-22

## 2024-08-22 RX ORDER — ESCITALOPRAM OXALATE 5 MG/1
5 TABLET ORAL DAILY
Qty: 30 TABLET | Refills: 0 | Status: SHIPPED | OUTPATIENT
Start: 2024-08-22

## 2024-08-22 ASSESSMENT — PAIN SCALES - GENERAL: PAINLEVEL: NO PAIN (0)

## 2024-08-22 NOTE — PROGRESS NOTES
Virtual Visit Details    Type of service:  Video Visit   Video Start Time:  4:10p  Video End Time: 4:20p    Originating Location (pt. Location): Home    Distant Location (provider location):  Off-site  Platform used for Video Visit: [employer]HR       PSYCHIATRIC MEDICATION FOLLOW UP APPT     Name:  Sid Miranda  : 1995    Patient attended the phone/video session alone.    Last seen for outpatient psychiatry Return Visit on 24.      FOLLOWING PLAN PUT INTO PLACE: yes    INTERIM HISTORY     COMMUNICATIONS FROM PATIENT VIA:  would like to increase his lexapro to 15mg and has also noticed nausea associated with nicotine use.     RECORDS AVAILABLE FOR REVIEW: EHR records through [employer] .      HISTORY OF PRESENT ILLNESS   Reports past diagnosis of: substance abuse disorder, generalized anxiety,      Seeing Francisco a bit over a year now. Narrowing goals for treatment. Barriers to improvement  Would like to address baseline depression and meth use.   First sought treatment: in late childhood. Saw therapy at age 8 and 11-also had psych meds and stopped psychotherapist around 14 and continued medsf ora few years. Therapisy in mid 20s.   Since then recovery at 24. Substance abuse treatment centers. Before francisco in DBT.   Couple of hospitalizations:   First at 11: 3 day thing.   Inpatient 12 (3 day eval after pretending suidicde attempt) and one at 13 (SIB).   Has community support. Closest friends in [facility] in active use.   Boyfriend in recovery   Had a sponsor and was fired.      iSd Miranda is a 28 year old White Not  or  male presenting for psychiatric evaluation and medication management. Information is obtained from patient and available records.  Reports history of    Generalized anxiety disorder  Depression, unspecified depression type  Post-traumatic stress disorder  Severe stimulant use disorder (H)   Previously psychiatrically hospitalized during adolescence various times and chemical  dependency treatment. History of IOP as well.  Hx of suicidal ideation and attempts.   Hx of self-injurious behaviors in the form of cutting starting at age middle school.  Genetically loaded for  substance use, schizophrenia. Grew up in an intact home with all basic needs being met.     Sid was seen today for consult.     His stimulant use anxiety and depression are not controlled it is reassuring he is in therapy is currently working with therapist for harm reduction with stimulant use disorder.  I am prescribing Lexapro 5 mg to address anxiety and depression to better overall mood.  Follow-up in 1 month.      Today: 6/27/24:      Started lexapro briefly, get in next week with primary, imitrex prescribed for migraines no more episodes, end of that week. Use episide for one day. Then over. Restarted lexapro for 3 weeks. Spoke with brenda. Increase depression. Couple other variables: Money issues and other variables impacting depression. Interested in naltrexone for substance use.  Sid Miranda is a 28 year old White Not  or  male presenting for psychiatric evaluation and medication management. Information is obtained from patient and available records.  Reports history of    Generalized anxiety disorder  Depression, unspecified depression type  Post-traumatic stress disorder  Severe stimulant use disorder (H)   Previously psychiatrically hospitalized during adolescence various times and chemical dependency treatment. History of IOP as well.  Hx of suicidal ideation and attempts.   Hx of self-injurious behaviors in the form of cutting starting at age middle school.  Genetically loaded for  substance use, schizophrenia. Grew up in an intact home with all basic needs being met.     Sid was seen today for consult.     His stimulant use anxiety and depression are not controlled it is reassuring he is in therapy is currently working with therapist for harm reduction with stimulant use disorder.  I am  prescribing Lexapro 5 mg to address anxiety and depression to better overall mood.  Follow-up in 1 month.   Diagnoses and all orders for this visit:     6/27/24:  Sid was seen today for recheck.  Prescribed naltrexone for stimulant use. Advised him he may experience nausea and gave him zofran as needed. Increased lexapro to 10mg to address depression and anxiety. Spent time talking about safety due to passive thoughts of suicidal ideation and talked about resources if he needed them. Follow up in a month.      Today: 7/11/24: new dose of lexapro is fine. Naltrexone notes no side effects.   Reports suicidal thoughts. Most days have been better. Today, lower mood kind of tired. Contracts for safety.    Sid was seen today for recheck.  Provied refills of medications. Offered to increase lexapro to 15mg due to continued depressive symptoms. Anxiety not controlled. Follow up in a month.     Today: 8/22/24:   Notes things are going pretty well. New job at [employer]. Has a pull toward nonprofit work. Notes experiencing scattered nightmares a couple times a week he remembers it.  Wakes up at 1am and walks around and wonders if it's happening more than once a week. Sleep has been segmented. Naltrexone going well no nausea. Downtick in cravings.     FAMILY, MEDICAL, SURGICAL HISTORY REVIEWED.  MEDICATION HAVE BEEN REVIEWED AND ARE CURRENT TO THE BEST OF MY KNOWLEDGE AND ABILITY.    MEDICATIONS                                                                                                Current Outpatient Medications   Medication Sig Dispense Refill    escitalopram (LEXAPRO) 10 MG tablet Take 1 tablet (10 mg) by mouth daily 30 tablet 0    escitalopram (LEXAPRO) 5 MG tablet Take 1 tablet (5 mg) by mouth daily 30 tablet 0    naltrexone (DEPADE/REVIA) 50 MG tablet Take 1 tablet (50 mg) by mouth daily 30 tablet 0    ondansetron (ZOFRAN ODT) 4 MG ODT tab Take 1 tablet (4 mg) by mouth every 8 hours as needed for nausea  "30 tablet 0     No current facility-administered medications for this visit.         PSYCHOTROPIC DRUG INTERACTIONS                                         none    MANAGEMENT:  N/A    TODAY PATIENT REPORTS THE FOLLOWING PSYCHIATRIC ROS:   EXERCISE: Adequate  SIDE EFFECTS:  tolerating medications without reported side effects  COMPLIANCE:  states Adherent to medication regimen  REPORTS THE FOLLOWING NEW MEDICAL ISSUES:  none    PROBLEM: DEPRESSION: Improving        No data to display                   No data to display              PHQ9 score is Not completed today  Suicidal ideation:  No     PROBLEM: ANXIETY: Improving.   GAD7 score is Not completed today      4/4/2023     8:55 AM   LORENZO-7 SCORE   Total Score 6 (mild anxiety)   Total Score 6    6       PROBLEM: CHRONIC SUICIDAL IDEATIONS: current: No     PROBLEM: SLEEP/INSOMNIA: Worsening.     PERTINENT PAST MEDICAL AND SURGICAL HISTORY   No past medical history on file.    VITALS     BP Readings from Last 1 Encounters:   05/22/24 119/58     Pulse Readings from Last 1 Encounters:   05/22/24 68     Wt Readings from Last 1 Encounters:   07/11/24 81.6 kg (180 lb)     Ht Readings from Last 1 Encounters:   07/11/24 1.829 m (6')     Estimated body mass index is 24.41 kg/m  as calculated from the following:    Height as of 7/11/24: 1.829 m (6').    Weight as of 7/11/24: 81.6 kg (180 lb).    LABS & IMAGING                                                                                                                No labs today.   No lab results found.  No lab results found.  No lab results found.  No lab results found.  No results found for: \"ESB206\", \"ZHUW353\", \"[id number]\", \"VITD3\", \"D2VIT\", \"D3VIT\", \"DTOT\", \"CP73477112\", \"[id number]\", \"[id number]\", \"[id number]\", \"[id number]\", \"[id number]\"     ALLERGY & IMMUNIZATIONS     No Known Allergies    MEDICAL REVIEW OF SYSTEMS:   Ten system review was completed with pertinent positives noted     MENTAL STATUS EXAM: "   General/Constitutional:  Appearance:  awake, alert, adequately groomed, appeared stated age and no apparent distress  Attitude:   cooperative   Eye Contact:  good  Musculoskeletal:  Psychomotor Behavior:  no evidence of tardive dyskinesia, dystonia, or tics from the head up  Psychiatric:  Speech:  clear, coherent, regular rate, rhythm, and volume,  No pressure speech noted.  Associations:  no loose associations  Thought Process:  logical, linear and goal oriented  Thought Content:   No evidence of suicidal ideation or homicidal ideation, no evidence of psychotic thought, no auditory hallucinations present and no visual hallucinations present  Mood:  better and good  Affect:  full range/stable (normal variation of emotions during exam) and was not congruent to speech content.  Insight:  good  Judgment:  intact, adequate for safety  Impulse Control:  intact  Neurological:  Oriented to:  person, place, time, and situation  Attention Span and Concentration:  Able to attend to the interview     Language: intact    Recent and Remote Memory:  Intact to interview. Not formally assessed. No amnesia.   Fund of Knowledge: appropriate         DSM 5 DIAGNOSIS:      Severe stimulant use disorder (H)  Generalized anxiety disorder  Depression, unspecified depression type  Post-traumatic stress disorder      MEDICAL COMORBIDITY IMPACTING CLINICAL PICTURE: None noted.  Known issue that I take into account for their medical decisions, no current exacerbations or new concerns      ASSESSMENT AND PLAN    Sid was seen today for recheck.  No adjistments needed to lexapro. Tolerating naltrexone well. Introduced the idea of prazosin for nightmares. He is willing to do some research on this topic. Conditions better controlled.   Diagnoses and all orders for this visit:    Severe stimulant use disorder (H)  -     naltrexone (DEPADE/REVIA) 50 MG tablet; Take 1 tablet (50 mg) by mouth daily.    Generalized anxiety disorder  -      escitalopram (LEXAPRO) 5 MG tablet; Take 1 tablet (5 mg) by mouth daily.  -     escitalopram (LEXAPRO) 10 MG tablet; Take 1 tablet (10 mg) by mouth daily.    Depression, unspecified depression type  -     escitalopram (LEXAPRO) 5 MG tablet; Take 1 tablet (5 mg) by mouth daily.    Post-traumatic stress disorder  -     escitalopram (LEXAPRO) 5 MG tablet; Take 1 tablet (5 mg) by mouth daily.    Follow up: 1 month.          HIGH RISK MEDICATION:  No       ADMINISTRATIVE BILLING:   The longitudinal plan of care for the diagnosis(es)/condition(s) as documented were addressed during this visit. Due to the added complexity in care, I will continue to support Sid in the subsequent management and with ongoing continuity of care.       Patient Status:  Patient will continue to be seen for ongoing consultation and stabilization.    Signed:   Neville West PA-C

## 2024-08-22 NOTE — NURSING NOTE
Is the patient currently in the state of [place]? YES    Current patient location: [place] [address]  [address] [facility] [address]    Visit mode:VIDEO    If the visit is dropped, the patient can be reconnected by: VIDEO VISIT:  Send e-mail to at [email or web address]    Will anyone else be joining the visit? No  (If patient encounters technical issues they should call [phone])    How would you like to obtain your AVS? MyChart    Are changes needed to the allergy or medication list? No    Are refills needed on medications prescribed by this physician? NO    Rooming Documentation: Patient declined to complete qnrs with VF.    Reason for visit: BIANKA Kiser

## 2024-08-26 ENCOUNTER — VIRTUAL VISIT (OUTPATIENT)
Dept: PSYCHOLOGY | Facility: CLINIC | Age: 29
End: 2024-08-26
Payer: COMMERCIAL

## 2024-08-26 DIAGNOSIS — F33.1 MODERATE EPISODE OF RECURRENT MAJOR DEPRESSIVE DISORDER (H): ICD-10-CM

## 2024-08-26 DIAGNOSIS — F43.10 POST-TRAUMATIC STRESS DISORDER: ICD-10-CM

## 2024-08-26 DIAGNOSIS — F15.20 SEVERE STIMULANT USE DISORDER (H): ICD-10-CM

## 2024-08-26 DIAGNOSIS — F41.1 GENERALIZED ANXIETY DISORDER: Primary | ICD-10-CM

## 2024-08-26 PROCEDURE — 90834 PSYTX W PT 45 MINUTES: CPT | Mod: 95 | Performed by: STUDENT IN AN ORGANIZED HEALTH CARE EDUCATION/TRAINING PROGRAM

## 2024-08-26 NOTE — PROGRESS NOTES
[place] [facility] - Progress Note    Date of Service: 24   Name: Sid Miranda  : 1995  Medical Record Number: [id number]  Treating Provider: Francisco Cannon, Ph.D,    Type of Session: Individual  Present in Session: Client and therapist  Session Start and Stop Time: 10:08am-10:55pm ; appt started late due to provider's technology problems  Number of Minutes:  47    SERVICE MODALITY:  Video Visit:      Provider verified identity through the following two step process.  Patient provided:  Patient is known previously to provider and Patient was verified at admission/transfer    Telemedicine Visit: The patient's condition can be safely assessed and treated via synchronous audio and visual telemedicine encounter.      Reason for Telemedicine Visit: Patient has requested telehealth visit    Originating Site (Patient Location): Patient's home    Distant Site (Provider Location): [facility]    Consent:  The patient/guardian has verbally consented to: the potential risks and benefits of telemedicine (video visit) versus in person care; bill my insurance or make self-payment for services provided; and responsibility for payment of non-covered services.     Patient would like the video invitation sent by:  My Chart    Mode of Communication:  Video Conference via Amwell    Distant Location (Provider):  On-site    As the provider I attest to compliance with applicable laws and regulations related to telemedicine.      DSM-5 Diagnoses:  Encounter Diagnoses   Name Primary?    Generalized anxiety disorder Yes    Moderate episode of recurrent major depressive disorder (H)     Severe stimulant use disorder (H)     Post-traumatic stress disorder          Current Reported Symptoms and Status update:  Changes since last session includes increased anxiety and worry exacerbated by significant life changes (e.g., changing jobs); increased meth-related cravings  secondary to stress and anxiety; no recent meth use; improved trauma-related activation; depression improved; practicing through stopping.    Post Traumatic Stress Disorder: Client reported a history of traumatic events meeting criteria for diagnosis. Client experiences recurrent memories, troubling dreams, physiological reactivity in response to memories/reminders of traumatic events, and trauma-related avoidance.    Generalized Anxiety Disorder: Excessive anxiety and worry about a number of events or activities (such as work or school performance); difficulty controlling worry; restlessness or feeling keyed up or on edge; difficulty concentrating or mind going blank; irritability.      Depression: Client reports hopelessness, depressed mood, irritability, anhedonia, low self-worth, isolation, fatigue, poor sleep, decreased functioning.      Severe Stimulant Use Disorder: Client has a history of methamphetamine use that significantly negatively impacts functioning, including financial stress, loss of employment, and loss of relationships. He continued to use despite negative consequences. Client has a history of sexual behavior he describes as compulsive when using meth. Further assessment needed to determine if compulsive sexual behaviors also occur separately from meth use.     Progress Toward Treatment Goals:   Satisfactory progress     Therapeutic Interventions/Treatment Strategies:    Area(s) of treatment focus addressed in this session included Symptom Management and Interpersonal Relationship Skills    Client shared recent updates in his transition to a new job. He processed anxiety about his and his partner's current living situation and if/how he talks with his partner about it. Client did a pros/cons list of whether or not to discuss it as part of problem-solving. We highlighted client's engagement in social support and self-regulation skills. He also reflected on friendship dynamics and how he navigates  complexity in relationships. Client processed feelings of loss/grief related to old habits, and ways his professional life is evolving such that old habits no longer fit his goals or lifestyle. He processed feelings of frustration and desire. We discussed alternative rebellion as a way of having needs for novelty met.    Psychotherapist offered support, feedback and validation and reinforced use of skills Treatment modalities used include Cognitive Behavioral Therapy  Support, Feedback, and Clarification    Patient Response:   Patient responded to session by accepting feedback, giving feedback, listening, focusing on goals, and accepting support  Possible barriers to participation / learning include: N/A    Current Mental Status Exam:   Appearance:  Appropriate   Eye Contact:  Good   Attitude / Demeanor: Friendly  Speech      Rate / Production: Normal/ Responsive      Volume:  Normal  volume  Orientation:  All  Mood:   Normal  Affect:   Appropriate   Thought Content: Clear   Insight:   Good         Plan/Need for Future Services:  Return for therapy in 1-2 weeks to treat diagnosed problems.    Patient has a current master individualized treatment plan.  See Epic treatment plan for more information.    Referral / Collaboration:  Referral to another professional/service is not indicated at this time..  Emergency Services Needed?  No    Assignment:  Self-care, coping, social support  Consider options for alternative rebellion    Interactive Complexity:  There are four specific communication difficulties that complicate the work of the primary psychiatric procedure.  Interactive complexity (+02096) may be reported when at least one of these difficulties is present.    Communication difficulties present during current the psychiatric procedure include:  None.      Signature/Title:      Francisco Cannon, PhD, LP    [place] [facility], Sexual and [facility] Health Clinic  Department of  Family Medicine and Community Health  [facility]

## 2024-08-26 NOTE — NURSING NOTE
Current patient location: [place] [address]  [address] [address]    Is the patient currently in the state of [place]? YES    Visit mode:VIDEO    If the visit is dropped, the patient can be reconnected by: VIDEO VISIT: Send to e-mail at: [email or web address]    Will anyone else be joining the visit? NO  (If patient encounters technical issues they should call [phone]    How would you like to obtain your AVS? MyChart    Are changes needed to the allergy or medication list? N/A    Are refills needed on medications prescribed by this physician? NO    Rooming Documentation:  Not applicable      Reason for visit: RECHECK    Emilie CARLTON

## 2024-09-09 ENCOUNTER — VIRTUAL VISIT (OUTPATIENT)
Dept: PSYCHOLOGY | Facility: CLINIC | Age: 29
End: 2024-09-09
Payer: COMMERCIAL

## 2024-09-09 DIAGNOSIS — F43.10 POST-TRAUMATIC STRESS DISORDER: ICD-10-CM

## 2024-09-09 DIAGNOSIS — F15.20 SEVERE STIMULANT USE DISORDER (H): ICD-10-CM

## 2024-09-09 DIAGNOSIS — F33.1 MODERATE EPISODE OF RECURRENT MAJOR DEPRESSIVE DISORDER (H): ICD-10-CM

## 2024-09-09 DIAGNOSIS — F41.1 GENERALIZED ANXIETY DISORDER: Primary | ICD-10-CM

## 2024-09-09 PROCEDURE — 90834 PSYTX W PT 45 MINUTES: CPT | Mod: 95 | Performed by: STUDENT IN AN ORGANIZED HEALTH CARE EDUCATION/TRAINING PROGRAM

## 2024-09-09 NOTE — NURSING NOTE
Is the patient currently in the state of [place]? YES    Current patient location: [place] [address] [facility] [address]    Visit mode:VIDEO    If the visit is dropped, the patient can be reconnected by: VIDEO VISIT:  Send e-mail to at [email or web address]    Will anyone else be joining the visit? No  (If patient encounters technical issues they should call [phone])    How would you like to obtain your AVS? MyChart    Are changes needed to the allergy or medication list? N/A    Are refills needed on medications prescribed by this physician? NO    Rooming Documentation: Questionnaire(s) not done per department protocol.    Reason for visit: RECHECK     BIANKA Burnham

## 2024-09-09 NOTE — PROGRESS NOTES
"    [place] [facility] - Progress Note    Date of Service: 24   Name: Sid Miranda  : 1995  Medical Record Number: [id number]  Treating Provider: Francisco Cannon, Ph.D,     Type of Session: Individual  Present in Session: Client and therapist  Session Start and Stop Time: 10:05am-10:52   Number of Minutes:  47    SERVICE MODALITY:  Video Visit:      Provider verified identity through the following two step process.  Patient provided:  Patient is known previously to provider and Patient was verified at admission/transfer    Telemedicine Visit: The patient's condition can be safely assessed and treated via synchronous audio and visual telemedicine encounter.      Reason for Telemedicine Visit: Patient has requested telehealth visit    Originating Site (Patient Location): Patient's home    Distant Site (Provider Location): [facility]    Consent:  The patient/guardian has verbally consented to: the potential risks and benefits of telemedicine (video visit) versus in person care; bill my insurance or make self-payment for services provided; and responsibility for payment of non-covered services.     Patient would like the video invitation sent by:  My Chart    Mode of Communication:  Video Conference via [employer]    Distant Location (Provider):  On-site    As the provider I attest to compliance with applicable laws and regulations related to telemedicine.      DSM-5 Diagnoses:  Encounter Diagnoses   Name Primary?    Generalized anxiety disorder Yes    Moderate episode of recurrent major depressive disorder (H)     Severe stimulant use disorder (H)     Post-traumatic stress disorder          Current Reported Symptoms and Status update:  Changes since last session includes recently starting a new job, trying out new NA meetings, working to prioritize self-care, improvement in depression, anxiety \"manageable\" with treatment, some urges to use meth " when bored and feeling nervous when experiencing urges.     Post Traumatic Stress Disorder: Client reported a history of traumatic events meeting criteria for diagnosis. Client experiences recurrent memories, troubling dreams, physiological reactivity in response to memories/reminders of traumatic events, and trauma-related avoidance.    Generalized Anxiety Disorder: Excessive anxiety and worry about a number of events or activities (such as work or school performance); difficulty controlling worry; restlessness or feeling keyed up or on edge; difficulty concentrating or mind going blank; irritability.      Depression: Client reports hopelessness, depressed mood, irritability, anhedonia, low self-worth, isolation, fatigue, poor sleep, decreased functioning.      Severe Stimulant Use Disorder: Client has a history of methamphetamine use that significantly negatively impacts functioning, including financial stress, loss of employment, and loss of relationships. He continued to use despite negative consequences. Client has a history of sexual behavior he describes as compulsive when using meth. Further assessment needed to determine if compulsive sexual behaviors also occur separately from meth use.     Progress Toward Treatment Goals:   Satisfactory progress     Therapeutic Interventions/Treatment Strategies:    Area(s) of treatment focus addressed in this session included Symptom Management, Interpersonal Relationship Skills, and Sexual Health and Wellness    Client shared his transition to his new job and stressful experiences. He described overall adjusting to his new role well. He shared improvement in wellbeing and maintained sobriety from meth, although he has some worries about use when he experiences urges. He is trying new 12-step meetings. He also shared some worries related to body image. He described treatment - including medication - as helpful in his recovery process. We explored strategies and factors  that can help address mental health and sobriety. He identified self-care, diet, service opportunities, and social support as helpful. Client identified distress related to boredom, including restlessness. We discussed mindful ways of engaging in boredom and ways to provide meaningful activity/distraction when distressed. Client also processed anxiety related to the current socio-political climate.    Psychotherapist offered support, feedback and validation and reinforced use of skills Treatment modalities used include Cognitive Behavioral Therapy  Support, Feedback, and Clarification    Patient Response:   Patient responded to session by accepting feedback, giving feedback, listening, focusing on goals, and accepting support  Possible barriers to participation / learning include: N/A    Current Mental Status Exam:   Appearance:  Appropriate   Eye Contact:  Good   Attitude / Demeanor: Friendly  Speech      Rate / Production: Normal/ Responsive      Volume:  Normal  volume  Orientation:  All  Mood:   Normal  Affect:   Appropriate   Thought Content: Clear   Insight:   Good         Plan/Need for Future Services:  Return for therapy in 1 week to treat diagnosed problems.    Patient has a current master individualized treatment plan.  See Epic treatment plan for more information.    Referral / Collaboration:  Referral to another professional/service is not indicated at this time..  Emergency Services Needed?  No    Assignment:  Continue self-care    Interactive Complexity:  There are four specific communication difficulties that complicate the work of the primary psychiatric procedure.  Interactive complexity (+66971) may be reported when at least one of these difficulties is present.    Communication difficulties present during current the psychiatric procedure include:  None.      Signature/Title:    Francisco Cannon, PhD, LP    [place] [facility], Sexual and Gender Health  Clinic  Department of Family Medicine and Community Health  [facility]

## 2024-09-16 ENCOUNTER — VIRTUAL VISIT (OUTPATIENT)
Dept: PSYCHOLOGY | Facility: CLINIC | Age: 29
End: 2024-09-16
Payer: COMMERCIAL

## 2024-09-16 DIAGNOSIS — F41.1 GENERALIZED ANXIETY DISORDER: Primary | ICD-10-CM

## 2024-09-16 DIAGNOSIS — F33.1 MODERATE EPISODE OF RECURRENT MAJOR DEPRESSIVE DISORDER (H): ICD-10-CM

## 2024-09-16 DIAGNOSIS — F15.20 SEVERE STIMULANT USE DISORDER (H): ICD-10-CM

## 2024-09-16 DIAGNOSIS — F43.10 POST-TRAUMATIC STRESS DISORDER: ICD-10-CM

## 2024-09-16 PROCEDURE — 90834 PSYTX W PT 45 MINUTES: CPT | Mod: 95 | Performed by: STUDENT IN AN ORGANIZED HEALTH CARE EDUCATION/TRAINING PROGRAM

## 2024-09-16 NOTE — PROGRESS NOTES
[place] [facility] - Progress Note    Date of Service: 24   Name: Sid Miranda  : 1995  Medical Record Number: [id number]  Treating Provider: Francisco Cannon, Ph.D,   Type of Session: Individual  Present in Session: Client and therapist  Session Start and Stop Time: 11:03am-11:50am  Number of Minutes:  47    SERVICE MODALITY:  Video Visit:      Provider verified identity through the following two step process.  Patient provided:  Patient is known previously to provider and Patient was verified at admission/transfer    Telemedicine Visit: The patient's condition can be safely assessed and treated via synchronous audio and visual telemedicine encounter.      Reason for Telemedicine Visit: Patient has requested telehealth visit    Originating Site (Patient Location): Patient's home    Distant Site (Provider Location): [facility]    Consent:  The patient/guardian has verbally consented to: the potential risks and benefits of telemedicine (video visit) versus in person care; bill my insurance or make self-payment for services provided; and responsibility for payment of non-covered services.     Patient would like the video invitation sent by:  My Chart    Mode of Communication:  Video Conference via Printi    Distant Location (Provider):  On-site    As the provider I attest to compliance with applicable laws and regulations related to telemedicine.      DSM-5 Diagnoses:  Encounter Diagnoses   Name Primary?    Generalized anxiety disorder Yes    Moderate episode of recurrent major depressive disorder (H)     Severe stimulant use disorder (H)     Post-traumatic stress disorder          Current Reported Symptoms and Status update:  Changes since last session includes improvement in anxiety, worry, and stress; some depressive symptoms; improvement in PTSD symptoms; managing recovery related to meth use.    Post Traumatic Stress Disorder: Client  reported a history of traumatic events meeting criteria for diagnosis. Client experiences recurrent memories, troubling dreams, physiological reactivity in response to memories/reminders of traumatic events, and trauma-related avoidance.    Generalized Anxiety Disorder: Excessive anxiety and worry about a number of events or activities (such as work or school performance); difficulty controlling worry; restlessness or feeling keyed up or on edge; difficulty concentrating or mind going blank; irritability.      Depression: Client reports hopelessness, depressed mood, irritability, anhedonia, low self-worth, isolation, fatigue, poor sleep, decreased functioning.      Severe Stimulant Use Disorder: Client has a history of methamphetamine use that significantly negatively impacts functioning, including financial stress, loss of employment, and loss of relationships. He continued to use despite negative consequences. Client has a history of sexual behavior he describes as compulsive when using meth. Further assessment needed to determine if compulsive sexual behaviors also occur separately from meth use.     Progress Toward Treatment Goals:   Satisfactory progress     Therapeutic Interventions/Treatment Strategies:    Area(s) of treatment focus addressed in this session included Symptom Management and Interpersonal Relationship Skills    Client shared work experiences in his transition to his new job. He also shared relationship updates and ways in which he and his partner are able to navigate and communicate about needs. We reflected on client's treatment progress and symptom improvement. In particular, he feels less low and less intense emotional changes. He expressed curiosity about outcomes of his current treatment approaches and general life structure. He reflected on anxiety/stress related to the sociocultural/political climate. Client shared sexual concerns related to his recovery, trauma, and current  relationship. He shared having a general avoidance of sexuality at the current moment.    Psychotherapist offered support, feedback and validation and reinforced use of skills Treatment modalities used include Cognitive Behavioral Therapy  Support, Feedback, Problem Solving, and Clarification    Patient Response:   Patient responded to session by accepting feedback, giving feedback, listening, focusing on goals, and accepting support  Possible barriers to participation / learning include: N/A    Current Mental Status Exam:   Appearance:  Appropriate   Eye Contact:  Good   Attitude / Demeanor: Friendly  Speech      Rate / Production: Normal/ Responsive      Volume:  Normal  volume  Orientation:  All  Mood:   Normal  Affect:   Appropriate   Thought Content: Clear   Insight:   Good         Plan/Need for Future Services:  Return for therapy in 1 week to treat diagnosed problems.    Patient has a current master individualized treatment plan.  See Epic treatment plan for more information.    Referral / Collaboration:  Referral to another professional/service is not indicated at this time..  Emergency Services Needed?  No    Assignment:  Next session: autobiographical sexual history    Interactive Complexity:  There are four specific communication difficulties that complicate the work of the primary psychiatric procedure.  Interactive complexity (+55416) may be reported when at least one of these difficulties is present.    Communication difficulties present during current the psychiatric procedure include:  None.      Signature/Title:    Francisco Cannon, PhD, LP    [place] [facility], Sexual and Gender Health Clinic  Department of Family Medicine and Community Health  [facility]

## 2024-09-16 NOTE — NURSING NOTE
Current patient location: [place] [address]  [address] [facility] [address]    Is the patient currently in the state of [place]? YES    Visit mode:VIDEO    If the visit is dropped, the patient can be reconnected by: Email    Will anyone else be joining the visit? NO  (If patient encounters technical issues they should call [phone]    How would you like to obtain your AVS? MyChart    Are changes needed to the allergy or medication list? No    Are refills needed on medications prescribed by this physician? NO    Rooming Documentation:  Questionnaire(s) not done per department protocol      Reason for visit: RECHECK    Mildred CARLTON

## 2024-09-23 ENCOUNTER — VIRTUAL VISIT (OUTPATIENT)
Dept: PSYCHOLOGY | Facility: CLINIC | Age: 29
End: 2024-09-23
Payer: COMMERCIAL

## 2024-09-23 DIAGNOSIS — F15.20 SEVERE STIMULANT USE DISORDER (H): ICD-10-CM

## 2024-09-23 DIAGNOSIS — F33.1 MODERATE EPISODE OF RECURRENT MAJOR DEPRESSIVE DISORDER (H): Primary | ICD-10-CM

## 2024-09-23 DIAGNOSIS — F43.10 POST-TRAUMATIC STRESS DISORDER: ICD-10-CM

## 2024-09-23 DIAGNOSIS — F41.1 GENERALIZED ANXIETY DISORDER: ICD-10-CM

## 2024-09-23 PROCEDURE — 90834 PSYTX W PT 45 MINUTES: CPT | Mod: 95 | Performed by: STUDENT IN AN ORGANIZED HEALTH CARE EDUCATION/TRAINING PROGRAM

## 2024-09-23 NOTE — NURSING NOTE
Acute Care - Speech Language Pathology   Swallow Treatment Note [facility]     Patient Name: Denver Ames  : 1926  MRN: [id number]  Today's Date: 2023               Admit Date: 2023    Visit Dx:     ICD-10-CM ICD-9-CM   1. Acute congestive heart failure, unspecified heart failure type (HCC)  I50.9 428.0   2. Pneumonia of both lungs due to infectious organism, unspecified part of lung  J18.9 483.8     Patient Active Problem List   Diagnosis   • Personal history of other malignant neoplasm of skin   • Open wnd of scalp   • Open wound involving head with neck, complicated   • Paroxysmal atrial fibrillation (HCC)   • Coronary artery disease   • History of CHF (congestive heart failure)   • Hyperlipidemia   • Hypertension   • Renal insufficiency   • Subdural hygroma   • Chronic anticoagulation   • Traumatic subdural hygroma   • Acute on chronic diastolic CHF (congestive heart failure) (HCC)   • Controlled atrial fibrillation (HCC)   • Subdural hematoma, post-traumatic   • Cardiomyopathy (HCC)   • Benign essential hypertension   • Presence of aortocoronary bypass graft   • Hyperlipidemia   • Human metapneumovirus (hMPV) pneumonia     Past Medical History:   Diagnosis Date   • Atrial fibrillation (HCC)    • Cancer (HCC)     CANCER ON SCALP   • Chronic anticoagulation     FOR A- FIB   • Coronary artery disease    • History of CHF (congestive heart failure)    • History of COVID-19 2021    HEADACHE   • History of melanoma     SHOULDER   • Hyperlipidemia    • Hypertension    • MI (myocardial infarction) ([facility])    • Renal insufficiency      Past Surgical History:   Procedure Laterality Date   • APPENDECTOMY  1934   • CARDIAC CATHETERIZATION  2011   • CATARACT EXTRACTION W/ INTRAOCULAR LENS  IMPLANT, BILATERAL     • CORONARY ARTERY BYPASS GRAFT  10/01/2011    3 VESSELS   • EXCISION MASS HEAD/NECK N/A 3/31/2022    Procedure: HEAD NECK DEBRIDEMENT, scalp flap closure of open wound, with  Current patient location: [place] [address]  [address] [address]    Is the patient currently in the state of [place]? NO    Visit mode:VIDEO    If the visit is dropped, the patient can be reconnected by: VIDEO VISIT: Send to e-mail at: [email or web address]    Will anyone else be joining the visit? NO  (If patient encounters technical issues they should call [phone]    How would you like to obtain your AVS? MyChart    Are changes needed to the allergy or medication list? No    Are refills needed on medications prescribed by this physician? NO    Rooming Documentation:  Questionnaire(s) not done per department protocol    Reason for visit: RECHECK    Dhruv CARLTON       full thickness skin graft to head.;  Surgeon: Shakir Romero MD;  Location: [facility] MAIN OR;  Service: Plastics;  Laterality: N/A;       SLP Recommendation and Plan  Recommendations remain consistent from VFSS: HTL and mechanical soft/ground-no mixed consistencies. Medications: crushed in puree with cues for additional swallow s/p. Compensations: additional swallow following solids and liquids, alternate liquids and solids, no straws.        SLP available for education or therapy pending ongoing intervention level pursued and pt and family goals of care.      SWALLOW EVALUATION (last 72 hours)     SLP Adult Swallow Evaluation     Row Name 02/13/23 1600       Rehab Evaluation    Document Type re-evaluation  -AB    Subjective Information no complaints  -AB    Patient Observations alert;cooperative;agree to therapy  -AB    Patient/Family/Caregiver Comments/Observations seen upright in chair, pleasant and cooperative, room p492  -AB    Patient Effort good  -AB    Symptoms Noted During/After Treatment none  -AB       Pain    Additional Documentation Pain Scale: Numbers Pre/Post-Treatment (Group)  -AB       Pain Scale: Numbers Pre/Post-Treatment    Pretreatment Pain Rating 0/10 - no pain  -AB    Posttreatment Pain Rating 0/10 - no pain  -AB                               User Key  (r) = Recorded By, (t) = Taken By, (c) = Cosigned By    Initials Name Effective Dates    AB Basilia Roque, MS CCC-SLP 12/27/22 -                 EDUCATION  The patient has been educated in the following areas:   Dysphagia (Swallowing Impairment) Modified Diet Instruction.        SLP GOALS     Row Name 02/13/23 1600       (LTG) Patient will demonstrate functional swallow for    Diet Texture (Demonstrate functional swallow) soft to chew (ground) textures  -AB    Liquid viscosity (Demonstrate functional swallow) honey/  moderately thick liquids  -AB    [place] (Demonstrate functional swallow) independently (over 90% accuracy)  -AB     Progress/Outcomes (Demonstrate functional swallow) good progress toward goal  -AB       (STG) Swallow Compensatory Strategies Goal 1 (SLP)    Activity (Swallow Compensatory Strategies/Techniques Goal 1, SLP) alternate food/liquid intake;extra swallow per bolus  -AB    [place]/Accuracy (Swallow Compensatory Strategies/Techniques Goal 1, SLP) independently (over 90% accuracy)  -AB    Progress/Outcomes (Swallow Compensatory Strategies/Techniques Goal 1, SLP) goal partially met  -AB    Comment (Swallow Compensatory Strategies/Techniques Goal 1, SLP) VFSS follow up completed. Pt upright in chair, multiple family members at bedside. Family with 100% comprehension for diet/liquid levels. Discussed oropharyngeal dysphagia, deficits, including terms “aspiration,” and “residue.” Discussed positive indicator pt senses aspiration via cough, however cough ineffective in clearing material. Family with questions “Is he going to drink this thick stuff forever?” “Is pasta okay for him to eat?” “How do we thicken drinks at home?” Discussed plan of care with pt and family - multiple swallows, alternate liquids and solids. Attempted hard swallow cued with pt, cannot complete, attempts throat clear and cough. Pt does consume HTL via tsp with multiple swallows and slow rate with no cues. All recommendations provided in written format and included - how to thicken liquids, thickener information,  written diet compensations, compensations as pt plans to discharge home. Additionaly reiterated - if pt to pursue hospice/comfort care, may choose to eat or drink what he wishes with known risk of aspiration, however would be sensed via cough. Thick liquids aiding in comfort as well by reducing cough. Family verbalizing understanding, state goal is to take pt home, will decide further goals as indicated.   Recommendations remain consistent from VFSS: HTL and mechanical soft/ground-no mixed consistencies. Medications: crushed in puree with  cues for additional swallow s/p. Compensations: additional swallow following solids and liquids, alternate liquids and solids, no straws.    SLP available for education or therapy pending ongoing intervention level pursued and pt and family goals of care.  -AB          User Key  (r) = Recorded By, (t) = Taken By, (c) = Cosigned By    Initials Name Provider Type    Basilia Diaz MS CCC-SLP Speech and Language Pathologist                   Time Calculation:    Time Calculation- SLP     Row Name 02/13/23 1627             Time Calculation- SLP    SLP Received On 02/13/23  -AB            User Key  (r) = Recorded By, (t) = Taken By, (c) = Cosigned By    Initials Name Provider Type    Basilia Diaz MS CCC-SLP Speech and Language Pathologist                Therapy Charges for Today     Code Description Service Date Service Provider Modifiers Qty    [id number]  ST TREATMENT SWALLOW 3 2/13/2023 Basilia Roque, MS CCC-SLP GN 1               Basilia Roque MS CCC-SLP  2/13/2023

## 2024-09-23 NOTE — PROGRESS NOTES
[facility] - Progress Note    Date of Service: 24   Name: Sid Miranda  : 1995  Medical Record Number: [id number]  Treating Provider: Francisco Cannon, Ph.D,    Type of Session: Individual  Present in Session: Client and therapist  Session Start and Stop Time: 4:05pm-4:44pm  Number of Minutes:  39    SERVICE MODALITY:  Video Visit:      Provider verified identity through the following two step process.  Patient provided:  Patient is known previously to provider and Patient was verified at admission/transfer    Telemedicine Visit: The patient's condition can be safely assessed and treated via synchronous audio and visual telemedicine encounter.      Reason for Telemedicine Visit: Patient has requested telehealth visit    Originating Site (Patient Location): Patient's home    Distant Site (Provider Location): [facility]    Consent:  The patient/guardian has verbally consented to: the potential risks and benefits of telemedicine (video visit) versus in person care; bill my insurance or make self-payment for services provided; and responsibility for payment of non-covered services.     Patient would like the video invitation sent by:  My Chart    Mode of Communication:  Video Conference via [employer]    Distant Location (Provider):  On-site    As the provider I attest to compliance with applicable laws and regulations related to telemedicine.      DSM-5 Diagnoses:  Encounter Diagnoses   Name Primary?    Moderate episode of recurrent major depressive disorder (H) Yes    Generalized anxiety disorder     Severe stimulant use disorder (H)     Post-traumatic stress disorder          Current Reported Symptoms and Status update:  Changes since last session includes some feeling of dysregulation and sleep impairment, irritability, fatigue, some cravings/urges related to meth use, trauma-related activation.    Post Traumatic Stress Disorder:  Client reported a history of traumatic events meeting criteria for diagnosis. Client experiences recurrent memories, troubling dreams, physiological reactivity in response to memories/reminders of traumatic events, and trauma-related avoidance.    Generalized Anxiety Disorder: Excessive anxiety and worry about a number of events or activities (such as work or school performance); difficulty controlling worry; restlessness or feeling keyed up or on edge; difficulty concentrating or mind going blank; irritability.      Depression: Client reports hopelessness, depressed mood, irritability, anhedonia, low self-worth, isolation, fatigue, poor sleep, decreased functioning.      Severe Stimulant Use Disorder: Client has a history of methamphetamine use that significantly negatively impacts functioning, including financial stress, loss of employment, and loss of relationships. He continued to use despite negative consequences. Client has a history of sexual behavior he describes as compulsive when using meth. Further assessment needed to determine if compulsive sexual behaviors also occur separately from meth use.     Progress Toward Treatment Goals:   Satisfactory progress     Therapeutic Interventions/Treatment Strategies:    Area(s) of treatment focus addressed in this session included Symptom Management    Client shared feelings of general malaise and slight dysregulation he has been feeling secondary to attending a concert Friday, sleep disruption, and medication disruption. He was able to conceptualize the emotions. He reflected on how historically this would have been a risk factor for meth use, and noticed current urges/thoughts. He identified reasons for not using and plans to not use. He is reconnecting with his sponsor and working on next steps in 12-steps. He shared concerns about sex he wants to discuss at next session. We highlighted his capacity for flexible thinking and resilience.    Psychotherapist offered  support, feedback and validation and reinforced use of skills Treatment modalities used include Cognitive Behavioral Therapy  Support, Feedback, Problem Solving, and Clarification    Patient Response:   Patient responded to session by accepting feedback, giving feedback, listening, focusing on goals, and accepting support  Possible barriers to participation / learning include: N/A    Current Mental Status Exam:   Appearance:  Appropriate   Eye Contact:  Good   Attitude / Demeanor: Friendly  Speech      Rate / Production: Normal/ Responsive      Volume:  Normal  volume  Orientation:  All  Mood:   Normal  Affect:   Appropriate   Thought Content: Clear   Insight:   Good         Plan/Need for Future Services:  Return for therapy in 2 weeks to treat diagnosed problems. Continue weekly treatment group.  Patient has a current master individualized treatment plan.  See Epic treatment plan for more information.    Referral / Collaboration:  Referral to another professional/service is not indicated at this time..  Emergency Services Needed?  No      Assignment:  Revisit sexual concerns next session    Interactive Complexity:  There are four specific communication difficulties that complicate the work of the primary psychiatric procedure.  Interactive complexity (+50518) may be reported when at least one of these difficulties is present.    Communication difficulties present during current the psychiatric procedure include:  None.      Signature/Title:      Francisco Cannon, PhD, LP    [facility], [facility]  Department of Family Medicine and Community Health  [employer]

## 2024-09-30 ENCOUNTER — VIRTUAL VISIT (OUTPATIENT)
Dept: PSYCHOLOGY | Facility: CLINIC | Age: 29
End: 2024-09-30
Payer: COMMERCIAL

## 2024-09-30 DIAGNOSIS — F43.10 POST-TRAUMATIC STRESS DISORDER: ICD-10-CM

## 2024-09-30 DIAGNOSIS — F33.1 MODERATE EPISODE OF RECURRENT MAJOR DEPRESSIVE DISORDER (H): Primary | ICD-10-CM

## 2024-09-30 DIAGNOSIS — F15.20 SEVERE STIMULANT USE DISORDER (H): ICD-10-CM

## 2024-09-30 DIAGNOSIS — F41.1 GENERALIZED ANXIETY DISORDER: ICD-10-CM

## 2024-09-30 NOTE — NURSING NOTE
Is the patient currently in the state of [place]? YES    Current patient location: [address]  [place] [facility] [address]    Visit mode:VIDEO    If the visit is dropped, the patient can be reconnected by: VIDEO VISIT:  Send e-mail to at [email or web address]    Will anyone else be joining the visit? No  (If patient encounters technical issues they should call [phone])    How would you like to obtain your AVS? MyChart    Are changes needed to the allergy or medication list? N/A    Are refills needed on medications prescribed by this physician? No    Rooming Documentation: Questionnaire(s) not done per department protocol.    Reason for visit: SHERRY Sanders, VVF

## 2024-09-30 NOTE — PROGRESS NOTES
[facility] - Progress Note    Date of Service: 24   Name: Sid Miranda  : 1995  Medical Record Number: [id number]  Treating Provider: Francisco Cannon, Ph.D,   Type of Session: Individual  Present in Session: Client and therapist  Session Start and Stop Time: 11:01am-11:54am  Number of Minutes:  53    SERVICE MODALITY:  Video Visit:      Provider verified identity through the following two step process.  Patient provided:  Patient is known previously to provider and Patient was verified at admission/transfer    Telemedicine Visit: The patient's condition can be safely assessed and treated via synchronous audio and visual telemedicine encounter.      Reason for Telemedicine Visit: Patient has requested telehealth visit    Originating Site (Patient Location): Patient's home    Distant Site (Provider Location): [facility]    Consent:  The patient/guardian has verbally consented to: the potential risks and benefits of telemedicine (video visit) versus in person care; bill my insurance or make self-payment for services provided; and responsibility for payment of non-covered services.     Patient would like the video invitation sent by:  My Chart    Mode of Communication:  Video Conference via [employer]    Distant Location (Provider):  On-site    As the provider I attest to compliance with applicable laws and regulations related to telemedicine.      DSM-5 Diagnoses:  Encounter Diagnoses   Name Primary?    Moderate episode of recurrent major depressive disorder (H) Yes    Generalized anxiety disorder     Severe stimulant use disorder (H)     Post-traumatic stress disorder          Current Reported Symptoms and Status update:  Changes since last session includes overall improvement in depression and anxiety secondary to treatment; maintained sobriety from meth the last several weeks; trauma-related avoidance and negative beliefs.    Post  Traumatic Stress Disorder: Client reported a history of traumatic events meeting criteria for diagnosis. Client experiences recurrent memories, troubling dreams, physiological reactivity in response to memories/reminders of traumatic events, and trauma-related avoidance.    Generalized Anxiety Disorder: Excessive anxiety and worry about a number of events or activities (such as work or school performance); difficulty controlling worry; restlessness or feeling keyed up or on edge; difficulty concentrating or mind going blank; irritability.      Depression: Client reports hopelessness, depressed mood, irritability, anhedonia, low self-worth, isolation, fatigue, poor sleep, decreased functioning.      Severe Stimulant Use Disorder: Client has a history of methamphetamine use that significantly negatively impacts functioning, including financial stress, loss of employment, and loss of relationships. He continued to use despite negative consequences. Client has a history of sexual behavior he describes as compulsive when using meth. Further assessment needed to determine if compulsive sexual behaviors also occur separately from meth use.     Progress Toward Treatment Goals:   Satisfactory progress     Therapeutic Interventions/Treatment Strategies:    Area(s) of treatment focus addressed in this session included Symptom Management, Interpersonal Relationship Skills, and Sexual Health and Wellness    Client processed feelings of anhedonia related to relationship and intimacy functioning. Therapist supported client to consider multiple intersecting biopsychosocial factors related to mental health and substance use recovery that might be affecting interest and motivation. We also discussed his history of use and sexual patterns, and how they might be affecting current functioning. Client is working on finishing his 8th step homework for NA. We planned for client to start a sexual/relationship history to better understand  "patterns. We also discussed targeting negative metacognitions about what \"should\" be different and leaning more into acceptance.     Psychotherapist offered support, feedback and validation and reinforced use of skills Treatment modalities used include Cognitive Behavioral Therapy  Support, Feedback, Problem Solving, and Clarification    Patient Response:   Patient responded to session by accepting feedback, giving feedback, listening, focusing on goals, and accepting support  Possible barriers to participation / learning include: N/A    Current Mental Status Exam:   Appearance:  Appropriate   Eye Contact:  Good   Attitude / Demeanor: Friendly  Speech      Rate / Production: Normal/ Responsive      Volume:  Normal  volume  Orientation:  All  Mood:   Normal  Affect:   Appropriate   Thought Content: Clear   Insight:   Good       Plan/Need for Future Services:  Return for therapy in 2 weeks to treat diagnosed problems.    Patient has a current master individualized treatment plan.  See Epic treatment plan for more information.    Referral / Collaboration:  Referral to another professional/service is not indicated at this time..  Emergency Services Needed?  No    Assignment:  Sexual history    Interactive Complexity:  There are four specific communication difficulties that complicate the work of the primary psychiatric procedure.  Interactive complexity (+54177) may be reported when at least one of these difficulties is present.    Communication difficulties present during current the psychiatric procedure include:  None.      Signature/Title:    Francisco Cannon, PhD, LP    [facility], [facility]  Department of Family Medicine and Community Health  [employer]        "

## 2024-10-06 ENCOUNTER — HEALTH MAINTENANCE LETTER (OUTPATIENT)
Age: 29
End: 2024-10-06

## 2024-10-07 ENCOUNTER — VIRTUAL VISIT (OUTPATIENT)
Dept: PSYCHOLOGY | Facility: CLINIC | Age: 29
End: 2024-10-07
Payer: COMMERCIAL

## 2024-10-07 DIAGNOSIS — F15.20 SEVERE STIMULANT USE DISORDER (H): ICD-10-CM

## 2024-10-07 DIAGNOSIS — F41.1 GENERALIZED ANXIETY DISORDER: ICD-10-CM

## 2024-10-07 DIAGNOSIS — F33.1 MODERATE EPISODE OF RECURRENT MAJOR DEPRESSIVE DISORDER (H): ICD-10-CM

## 2024-10-07 DIAGNOSIS — F43.10 POST-TRAUMATIC STRESS DISORDER: Primary | ICD-10-CM

## 2024-10-07 PROCEDURE — 90837 PSYTX W PT 60 MINUTES: CPT | Mod: 95 | Performed by: STUDENT IN AN ORGANIZED HEALTH CARE EDUCATION/TRAINING PROGRAM

## 2024-10-07 NOTE — PROGRESS NOTES
[facility] - Progress Note    Date of Service: 10/07/24   Name: Sid Miranda  : 1995  Medical Record Number: [id number]  Treating Provider: Francisco Cannon, Ph.D,   Type of Session: Individual  Present in Session: Client and therapist  Session Start and Stop Time: 9:03am-9:57am  Number of Minutes:  54    SERVICE MODALITY:  Video Visit:      Provider verified identity through the following two step process.  Patient provided:  Patient is known previously to provider and Patient was verified at admission/transfer    Telemedicine Visit: The patient's condition can be safely assessed and treated via synchronous audio and visual telemedicine encounter.      Reason for Telemedicine Visit: Patient has requested telehealth visit    Originating Site (Patient Location): Patient's home    Distant Site (Provider Location): [facility]    Consent:  The patient/guardian has verbally consented to: the potential risks and benefits of telemedicine (video visit) versus in person care; bill my insurance or make self-payment for services provided; and responsibility for payment of non-covered services.     Patient would like the video invitation sent by:  My Chart    Mode of Communication:  Video Conference via [employer]    Distant Location (Provider):  On-site    As the provider I attest to compliance with applicable laws and regulations related to telemedicine.      DSM-5 Diagnoses:  Encounter Diagnoses   Name Primary?    Post-traumatic stress disorder Yes    Severe stimulant use disorder (H)     Moderate episode of recurrent major depressive disorder (H)     Generalized anxiety disorder          Current Reported Symptoms and Status update:  Changes since last session includes recent meth use episode, recent activation of distress related to adverse sexual experiences potentially contributing to use episodes, feeling disappointed, depression and anxiety  reasonably managed with treatment.    Post Traumatic Stress Disorder: Client reported a history of traumatic events meeting criteria for diagnosis. Client experiences recurrent memories, troubling dreams, physiological reactivity in response to memories/reminders of traumatic events, and trauma-related avoidance.    Generalized Anxiety Disorder: Excessive anxiety and worry about a number of events or activities (such as work or school performance); difficulty controlling worry; restlessness or feeling keyed up or on edge; difficulty concentrating or mind going blank; irritability.      Depression: Client reports hopelessness, depressed mood, irritability, anhedonia, low self-worth, isolation, fatigue, poor sleep, decreased functioning.      Severe Stimulant Use Disorder: Client has a history of methamphetamine use that significantly negatively impacts functioning, including financial stress, loss of employment, and loss of relationships. He continued to use despite negative consequences. Client has a history of sexual behavior he describes as compulsive when using meth. Further assessment needed to determine if compulsive sexual behaviors also occur separately from meth use.     Progress Toward Treatment Goals:   Satisfactory progress     Therapeutic Interventions/Treatment Strategies:    Area(s) of treatment focus addressed in this session included Symptom Management, Interpersonal Relationship Skills, and Sexual Health and Wellness    Client processed a recent use episode. He explored if urges to use were activated by issues/discussion around previous adverse experiences and plans to address them. Client reviewed harm-reduction strategies he engaged, ways he pivoted out of further use, and ways he is re-engaging self-care. He plans to continue engagement in 12-step programs. Client and therapist discussed ways of continuing to address adverse experiences while also reducing risk of future use. Client expressed  willingness to continue processing adverse experiences, especially given potential activation. Therapist supported client to start a timeline of overall life experiences, including adverse experiences. Client started identifying positive, neutral, and negative experiences from birth to age 7. We will continue working on this in future sessions.    Psychotherapist offered support, feedback and validation and reinforced use of skills Treatment modalities used include Cognitive Behavioral Therapy  Support, Feedback, and Problem Solving    Patient Response:   Patient responded to session by accepting feedback, giving feedback, listening, focusing on goals, and accepting support  Possible barriers to participation / learning include: N/A    Current Mental Status Exam:   Appearance:  Appropriate   Eye Contact:  Good   Attitude / Demeanor: Friendly  Speech      Rate / Production: Normal/ Responsive      Volume:  Normal  volume  Orientation:  All  Mood:   Normal  Affect:   Appropriate   Thought Content: Clear   Insight:   Good         Plan/Need for Future Services:  Return for therapy in 1-2 weeks to treat diagnosed problems.    Patient has a current master individualized treatment plan.  See Epic treatment plan for more information.    Referral / Collaboration:  Referral to another professional/service is not indicated at this time..  Emergency Services Needed?  No    Assignment:  Autobiographical History early childhood    Interactive Complexity:  There are four specific communication difficulties that complicate the work of the primary psychiatric procedure.  Interactive complexity (+34451) may be reported when at least one of these difficulties is present.    Communication difficulties present during current the psychiatric procedure include:  None.        Francisco Cannon, PhD, LP    [facility], [facility] and [facility]  Department of [facility]  [facility]

## 2024-10-07 NOTE — NURSING NOTE
Is the patient currently in the state of [place]? YES    Current patient location: [address]  [facility] [address]    Visit mode:VIDEO    If the visit is dropped, the patient can be reconnected by: VIDEO VISIT:  Send e-mail to at [email or web address]    Will anyone else be joining the visit? No  (If patient encounters technical issues they should call [phone])    Are changes needed to the allergy or medication list? N/A    Are refills needed on medications prescribed by this physician? No    Rooming Documentation: Questionnaire(s) not done per department protocol.    Reason for visit: RECHFARA Sanders, HANANEF

## 2024-10-21 ENCOUNTER — VIRTUAL VISIT (OUTPATIENT)
Dept: PSYCHOLOGY | Facility: CLINIC | Age: 29
End: 2024-10-21
Payer: COMMERCIAL

## 2024-10-21 DIAGNOSIS — F33.1 MODERATE EPISODE OF RECURRENT MAJOR DEPRESSIVE DISORDER (H): Primary | ICD-10-CM

## 2024-10-21 DIAGNOSIS — F15.20 SEVERE STIMULANT USE DISORDER (H): ICD-10-CM

## 2024-10-21 DIAGNOSIS — F41.1 GENERALIZED ANXIETY DISORDER: ICD-10-CM

## 2024-10-21 DIAGNOSIS — F43.10 POST-TRAUMATIC STRESS DISORDER: ICD-10-CM

## 2024-10-21 PROCEDURE — 90837 PSYTX W PT 60 MINUTES: CPT | Mod: 95 | Performed by: STUDENT IN AN ORGANIZED HEALTH CARE EDUCATION/TRAINING PROGRAM

## 2024-10-21 NOTE — PROGRESS NOTES
[place] [facility] - Progress Note    Date of Service: 10/21/24   Name: Sid Miranda  : 1995  Medical Record Number: [id number]  Treating Provider: Francisco Cannon, Ph.D,     Type of Session: Individual  Present in Session: Client and therapist  Session Start and Stop Time: 9:03am-9:56am  Number of Minutes:  53    SERVICE MODALITY:  Video Visit:      Provider verified identity through the following two step process.  Patient provided:  Patient is known previously to provider and Patient was verified at admission/transfer    Telemedicine Visit: The patient's condition can be safely assessed and treated via synchronous audio and visual telemedicine encounter.      Reason for Telemedicine Visit: Patient has requested telehealth visit    Originating Site (Patient Location): Patient's home    Distant Site (Provider Location): [facility]    Consent:  The patient/guardian has verbally consented to: the potential risks and benefits of telemedicine (video visit) versus in person care; bill my insurance or make self-payment for services provided; and responsibility for payment of non-covered services.     Patient would like the video invitation sent by:  My Chart    Mode of Communication:  Video Conference via [employer]    Distant Location (Provider):  On-site    As the provider I attest to compliance with applicable laws and regulations related to telemedicine.      DSM-5 Diagnoses:  Encounter Diagnoses   Name Primary?    Moderate episode of recurrent major depressive disorder (H) Yes    Post-traumatic stress disorder     Generalized anxiety disorder     Severe stimulant use disorder (H)          Current Reported Symptoms and Status update:  Changes since last session includes today is his birthday and experiencing mixed emotions; anhedonia; decreased sexual desire; feelings of guilt; irritability; recent nightmares.    Post Traumatic Stress Disorder: Client  reported a history of traumatic events meeting criteria for diagnosis. Client experiences recurrent memories, troubling dreams, physiological reactivity in response to memories/reminders of traumatic events, and trauma-related avoidance.    Generalized Anxiety Disorder: Excessive anxiety and worry about a number of events or activities (such as work or school performance); difficulty controlling worry; restlessness or feeling keyed up or on edge; difficulty concentrating or mind going blank; irritability.      Depression: Client reports hopelessness, depressed mood, irritability, anhedonia, low self-worth, isolation, fatigue, poor sleep, decreased functioning.      Severe Stimulant Use Disorder: Client has a history of methamphetamine use that significantly negatively impacts functioning, including financial stress, loss of employment, and loss of relationships. He continued to use despite negative consequences. Client has a history of sexual behavior he describes as compulsive when using meth. Further assessment needed to determine if compulsive sexual behaviors also occur separately from meth use.     Progress Toward Treatment Goals:   Satisfactory progress     Therapeutic Interventions/Treatment Strategies:    Area(s) of treatment focus addressed in this session included Symptom Management, Interpersonal Relationship Skills, and Sexual Health and Wellness    Client processed difficult emotions, anhedonia, and anxiety. He shared today is his birthday and he is experiencing conflicting feelings. 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 He processed challenging feelings while reflecting on his progress and life circumstances. We practiced identifying and expressing feelings. Client discussed whether and if he would share feelings/concerns with people important to him (e.g., yoga instructors). Encouraged client to set an intention and self-care practice for today.      Psychotherapist offered support, feedback and validation and reinforced use of skills Treatment modalities used include Cognitive Behavioral Therapy  Support, Feedback, and Clarification    Patient Response:   Patient responded to session by accepting feedback, giving feedback, listening, focusing on goals, and accepting support  Possible barriers to participation / learning include: N/A    Current Mental Status Exam:   Appearance:  Appropriate   Eye Contact:  Good   Attitude / Demeanor: Friendly  Speech      Rate / Production: Normal/ Responsive      Volume:  Normal  volume  Orientation:  All  Mood:   Normal  Affect:   Appropriate   Thought Content: Clear   Insight:   Good         Plan/Need for Future Services:  Return for therapy in 2 weeks to treat diagnosed problems.    Patient has a current master individualized treatment plan.  See Epic treatment plan for more information.    Referral / Collaboration:  Referral to another professional/service is not indicated at this time..  Emergency Services Needed?  No    Assignment:  Self-care act today  Consider discussing feelings with others    Interactive Complexity:  There are four specific communication difficulties that complicate the work of the primary psychiatric procedure.  Interactive complexity (+12522) may be reported when at least one of these difficulties is present.    Communication difficulties present during current the psychiatric procedure include:  None.      Signature/Title:      Francisco Cannon, PhD, LP    [facility], [facility]  Department of Family Medicine and Community Health  [employer] [employer]

## 2024-10-21 NOTE — NURSING NOTE
Current patient location: [address] [address]    Is the patient currently in the state of [place]? YES    Visit mode:VIDEO    If the visit is dropped, the patient can be reconnected by: VIDEO VISIT: Send to e-mail at: [email or web address]    Will anyone else be joining the visit? NO  (If patient encounters technical issues they should call [phone]    Are changes needed to the allergy or medication list? No    Are refills needed on medications prescribed by this physician? NO    Rooming Documentation:  Not applicable    Reason for visit: SHERRY CARLTON

## 2024-10-28 ENCOUNTER — VIRTUAL VISIT (OUTPATIENT)
Dept: PSYCHOLOGY | Facility: CLINIC | Age: 29
End: 2024-10-28
Payer: COMMERCIAL

## 2024-10-28 DIAGNOSIS — F15.20 SEVERE STIMULANT USE DISORDER (H): ICD-10-CM

## 2024-10-28 DIAGNOSIS — F41.1 GENERALIZED ANXIETY DISORDER: ICD-10-CM

## 2024-10-28 DIAGNOSIS — F33.1 MODERATE EPISODE OF RECURRENT MAJOR DEPRESSIVE DISORDER (H): Primary | ICD-10-CM

## 2024-10-28 DIAGNOSIS — F43.10 POST-TRAUMATIC STRESS DISORDER: ICD-10-CM

## 2024-10-28 PROCEDURE — 90837 PSYTX W PT 60 MINUTES: CPT | Mod: 95 | Performed by: STUDENT IN AN ORGANIZED HEALTH CARE EDUCATION/TRAINING PROGRAM

## 2024-10-28 NOTE — NURSING NOTE
Current patient location: [address] [address]    Is the patient currently in the state of [place]? YES    Visit mode:VIDEO    If the visit is dropped, the patient can be reconnected by: VIDEO VISIT: Send to e-mail at: [email or web address]    Will anyone else be joining the visit? NO  (If patient encounters technical issues they should call [phone]    Are changes needed to the allergy or medication list? N/A    Are refills needed on medications prescribed by this physician? NO    Rooming Documentation:  Not applicable    Reason for visit: RECHECK    Emilie CARLTON

## 2024-10-28 NOTE — PROGRESS NOTES
[facility] - Progress Note    Date of Service: 10/28/24   Name: Sid Miranda  : 1995  Medical Record Number: [id number]  Treating Provider: Francisco Cannon, Ph.D,     Type of Session: Individual  Present in Session: Client and therapist  Session Start and Stop Time: 9:01-9:55am  Number of Minutes:  54    SERVICE MODALITY:  Video Visit:      Provider verified identity through the following two step process.  Patient provided:  Patient is known previously to provider and Patient was verified at admission/transfer    Telemedicine Visit: The patient's condition can be safely assessed and treated via synchronous audio and visual telemedicine encounter.      Reason for Telemedicine Visit: Patient has requested telehealth visit    Originating Site (Patient Location): Patient's home    Distant Site (Provider Location): [facility]    Consent:  The patient/guardian has verbally consented to: the potential risks and benefits of telemedicine (video visit) versus in person care; bill my insurance or make self-payment for services provided; and responsibility for payment of non-covered services.     Patient would like the video invitation sent by:  My Chart    Mode of Communication:  Video Conference via [address]    Distant Location (Provider):  On-site    As the provider I attest to compliance with applicable laws and regulations related to telemedicine.      DSM-5 Diagnoses:  Encounter Diagnoses   Name Primary?    Moderate episode of recurrent major depressive disorder (H) Yes    Generalized anxiety disorder     Post-traumatic stress disorder     Severe stimulant use disorder (H)      Current Reported Symptoms and Status update:  Changes since last session includes improvement in mood, depression, and anxiety over the past week; positive experiences and success at work; validation at work; no recent meth use; increased 12-step meeting frequency over  "the past week.    Post Traumatic Stress Disorder: Client reported a history of traumatic events meeting criteria for diagnosis. Client experiences recurrent memories, troubling dreams, physiological reactivity in response to memories/reminders of traumatic events, and trauma-related avoidance.    Generalized Anxiety Disorder: Excessive anxiety and worry about a number of events or activities (such as work or school performance); difficulty controlling worry; restlessness or feeling keyed up or on edge; difficulty concentrating or mind going blank; irritability.      Depression: Client reports hopelessness, depressed mood, irritability, anhedonia, low self-worth, isolation, fatigue, poor sleep, decreased functioning.      Severe Stimulant Use Disorder: Client has a history of methamphetamine use that significantly negatively impacts functioning, including financial stress, loss of employment, and loss of relationships. He continued to use despite negative consequences. Client has a history of sexual behavior he describes as compulsive when using meth. Further assessment needed to determine if compulsive sexual behaviors also occur separately from meth use.     Progress Toward Treatment Goals:   Satisfactory progress     Therapeutic Interventions/Treatment Strategies:    Area(s) of treatment focus addressed in this session included Symptom Management and Sexual Health and Wellness    Client shared ways in which he has been successful at work. He engaged in self-care activities over the past week and increased his participation in 12-step programming. He also processed a series of nightmares that cause him distress. We reviewed content of nightmares and therapist provided pscyhoeducation on potential general activation or \"noise\" that comes out as nightmares. We also identified potential effects of TV he watches before/during sleep. Client processed changes in sexual functioning and desire causing negative effects in " his relationship. He identified 3 potential factors: medication effects, feeling bad about sex in the relationship, and wanting to maintain stability in recovery. We discussed ways of addressing desire in the relationship and planning to explore willingness at next session.    Psychotherapist offered support, feedback and validation and reinforced use of skills Treatment modalities used include Cognitive Behavioral Therapy  Support, Feedback, and Clarification    Patient Response:   Patient responded to session by accepting feedback, giving feedback, listening, focusing on goals, and accepting support  Possible barriers to participation / learning include: N/A    Current Mental Status Exam:   Appearance:  Appropriate   Eye Contact:  Good   Attitude / Demeanor: Friendly  Speech      Rate / Production: Normal/ Responsive      Volume:  Normal  volume  Orientation:  All  Mood:   Normal  Affect:   Appropriate   Thought Content: Clear   Insight:   Good       Plan/Need for Future Services:  Return for therapy in 1 week to treat diagnosed problems.    Patient has a current master individualized treatment plan.  See Epic treatment plan for more information.    Referral / Collaboration:  Referral to another professional/service is not indicated at this time..  Emergency Services Needed?  No    Assignment:  None assigned    Interactive Complexity:  There are four specific communication difficulties that complicate the work of the primary psychiatric procedure.  Interactive complexity (+21281) may be reported when at least one of these difficulties is present.    Communication difficulties present during current the psychiatric procedure include:  None.      Signature/Title:      Francisco Cannon, PhD, LP    [place] [facility], Sexual and Gender Health Clinic  Department of Family Medicine and Community Health  [employer] [facility] [employer]

## 2024-11-04 ENCOUNTER — MYC REFILL (OUTPATIENT)
Dept: PSYCHIATRY | Facility: CLINIC | Age: 29
End: 2024-11-04
Payer: COMMERCIAL

## 2024-11-04 ENCOUNTER — VIRTUAL VISIT (OUTPATIENT)
Dept: PSYCHOLOGY | Facility: CLINIC | Age: 29
End: 2024-11-04
Payer: COMMERCIAL

## 2024-11-04 DIAGNOSIS — F15.20 SEVERE STIMULANT USE DISORDER (H): ICD-10-CM

## 2024-11-04 DIAGNOSIS — F41.1 GENERALIZED ANXIETY DISORDER: ICD-10-CM

## 2024-11-04 DIAGNOSIS — F33.1 MODERATE EPISODE OF RECURRENT MAJOR DEPRESSIVE DISORDER (H): ICD-10-CM

## 2024-11-04 DIAGNOSIS — F43.10 POST-TRAUMATIC STRESS DISORDER: Primary | ICD-10-CM

## 2024-11-04 DIAGNOSIS — F32.A DEPRESSION, UNSPECIFIED DEPRESSION TYPE: ICD-10-CM

## 2024-11-04 DIAGNOSIS — F43.10 POST-TRAUMATIC STRESS DISORDER: ICD-10-CM

## 2024-11-04 PROCEDURE — 90837 PSYTX W PT 60 MINUTES: CPT | Mod: 95 | Performed by: STUDENT IN AN ORGANIZED HEALTH CARE EDUCATION/TRAINING PROGRAM

## 2024-11-04 RX ORDER — NALTREXONE HYDROCHLORIDE 50 MG/1
50 TABLET, FILM COATED ORAL DAILY
Qty: 30 TABLET | Refills: 0 | Status: SHIPPED | OUTPATIENT
Start: 2024-11-04

## 2024-11-04 RX ORDER — ESCITALOPRAM OXALATE 10 MG/1
10 TABLET ORAL DAILY
Qty: 30 TABLET | Refills: 0 | Status: SHIPPED | OUTPATIENT
Start: 2024-11-04

## 2024-11-04 RX ORDER — ESCITALOPRAM OXALATE 5 MG/1
5 TABLET ORAL DAILY
Qty: 30 TABLET | Refills: 0 | Status: SHIPPED | OUTPATIENT
Start: 2024-11-04

## 2024-11-04 NOTE — PROGRESS NOTES
[place] [facility] - Progress Note    Date of Service: 24   Name: Sid Miranda  : 1995  Medical Record Number: [id number]  Treating Provider: Francisco Cannon, Ph.D,     Type of Session: Individual  Present in Session: Client and therapist  Session Start and Stop Time: 11:02am-11:58am  Number of Minutes:  56    SERVICE MODALITY:  Video Visit:      Provider verified identity through the following two step process.  Patient provided:  Patient is known previously to provider and Patient was verified at admission/transfer    Telemedicine Visit: The patient's condition can be safely assessed and treated via synchronous audio and visual telemedicine encounter.      Reason for Telemedicine Visit: Patient has requested telehealth visit    Originating Site (Patient Location): Patient's home    Distant Site (Provider Location): [facility]    Consent:  The patient/guardian has verbally consented to: the potential risks and benefits of telemedicine (video visit) versus in person care; bill my insurance or make self-payment for services provided; and responsibility for payment of non-covered services.     Patient would like the video invitation sent by:  My Chart    Mode of Communication:  Video Conference via [employer]    Distant Location (Provider):  On-site    As the provider I attest to compliance with applicable laws and regulations related to telemedicine.      DSM-5 Diagnoses:  Encounter Diagnoses   Name Primary?    Moderate episode of recurrent major depressive disorder (H)     Generalized anxiety disorder     Post-traumatic stress disorder Yes    Severe stimulant use disorder (H)      Current Reported Symptoms and Status update:  Changes since last session includes increase in waking up at night (approx every 3 hours), nightmares and dreams related to use, considering if hydrating too much causing him to wake up; improvement in mood and  anxiety; some trauma-related activation; no recent meth use.    Post Traumatic Stress Disorder: Client reported a history of traumatic events meeting criteria for diagnosis. Client experiences recurrent memories, troubling dreams, physiological reactivity in response to memories/reminders of traumatic events, and trauma-related avoidance.    Generalized Anxiety Disorder: Excessive anxiety and worry about a number of events or activities (such as work or school performance); difficulty controlling worry; restlessness or feeling keyed up or on edge; difficulty concentrating or mind going blank; irritability.      Depression: Client reports hopelessness, depressed mood, irritability, anhedonia, low self-worth, isolation, fatigue, poor sleep, decreased functioning.      Severe Stimulant Use Disorder: Client has a history of methamphetamine use that significantly negatively impacts functioning, including financial stress, loss of employment, and loss of relationships. He continued to use despite negative consequences. Client has a history of sexual behavior he describes as compulsive when using meth. Further assessment needed to determine if compulsive sexual behaviors also occur separately from meth use.     Progress Toward Treatment Goals:   Satisfactory progress     Therapeutic Interventions/Treatment Strategies:    Area(s) of treatment focus addressed in this session included Symptom Management and Sexual Health and Wellness    Client shared recent nightmares, use-related dreams, and difficulties staying asleep. We reviewed potential sleep hygiene/environmental control strategies for sleep. We discussed client sharing concerns with his psychiatry provider, Neftali West, but not doing CBT-I for sleep given trauma and use history and potential for exacerbating symptoms. Client shared recent concerns in his relationship re: sexual desire, anhedonia, and avoidance. We identified ways he could share concerns with his  partner.     Psychotherapist offered support, feedback and validation and reinforced use of skills Treatment modalities used include Cognitive Behavioral Therapy  Support and Feedback    Patient Response:   Patient responded to session by accepting feedback, giving feedback, listening, focusing on goals, and accepting support  Possible barriers to participation / learning include: N/A    Current Mental Status Exam:   Appearance:  Appropriate   Eye Contact:  Good   Attitude / Demeanor: Friendly  Speech      Rate / Production: Normal/ Responsive      Volume:  Normal  volume  Orientation:  All  Mood:   Normal  Affect:   Appropriate   Thought Content: Clear   Insight:   Good       Plan/Need for Future Services:  Return for therapy to treat diagnosed problems.    Patient has a current master individualized treatment plan.  See Epic treatment plan for more information.    Referral / Collaboration:  Referral to another professional/service is not indicated at this time..  Emergency Services Needed?  No    Assignment:  Meet with Neftali West    Interactive Complexity:  There are four specific communication difficulties that complicate the work of the primary psychiatric procedure.  Interactive complexity (+87903) may be reported when at least one of these difficulties is present.    Communication difficulties present during current the psychiatric procedure include:  None.      Francisco Cannon, PhD, LP    [place] [facility], [facility]  Department of Family Medicine and Community Health  [employer] [facility]

## 2024-11-04 NOTE — TELEPHONE ENCOUNTER
Requested Medication:  Naltrexone 50 MG  Dose:   50 MG  Quantity:  30  Refills:  0    Take 1 tablet (50 mg) by mouth daily  _____    Requested Medication:  Escitalopram 10 MG  Dose:   10 MG  Quantity:  30  Refills:  0    Take 1 tablet (10 MG) by mouth daily  _____    Requested Medication:  Escitalopram 5 MG  Dose:   5 MG  Quantity:  30  Refills:  0    Take 1 tablet (5 MG) by mouth daily    Last seen at [facility]:  8/2024 - 1 mo  Next Appointment with Provider: 11/6/2024    Bethany Virk CMA

## 2024-11-04 NOTE — NURSING NOTE
Current patient location: [address] [address]    Is the patient currently in the state of [place]? YES    Visit mode:VIDEO    If the visit is dropped, the patient can be reconnected by: VIDEO VISIT: Send to e-mail at: [email or web address]    Will anyone else be joining the visit? NO  (If patient encounters technical issues they should call [phone]    Are changes needed to the allergy or medication list? N/A    Are refills needed on medications prescribed by this physician? NO    Rooming Documentation:  Not applicable    Reason for visit: SHERRY CARLTON

## 2024-11-06 ENCOUNTER — VIRTUAL VISIT (OUTPATIENT)
Dept: PSYCHIATRY | Facility: CLINIC | Age: 29
End: 2024-11-06
Payer: COMMERCIAL

## 2024-11-06 VITALS — BODY MASS INDEX: 25.18 KG/M2 | HEIGHT: 73 IN | WEIGHT: 190 LBS

## 2024-11-06 DIAGNOSIS — F41.1 GENERALIZED ANXIETY DISORDER: ICD-10-CM

## 2024-11-06 DIAGNOSIS — F32.A DEPRESSION, UNSPECIFIED DEPRESSION TYPE: Primary | ICD-10-CM

## 2024-11-06 DIAGNOSIS — F43.10 POST-TRAUMATIC STRESS DISORDER: ICD-10-CM

## 2024-11-06 ASSESSMENT — PAIN SCALES - GENERAL: PAINLEVEL_OUTOF10: NO PAIN (0)

## 2024-11-06 NOTE — PROGRESS NOTES
Virtual Visit Details    Type of service:  Video Visit   Video Start Time:  9:10a  Video End Time: 9:20a    Originating Location (pt. Location): Home    Distant Location (provider location):  Off-site  Platform used for Video Visit: [employer]    PSYCHIATRIC MEDICATION FOLLOW UP APPT     Name:  Sid Miranda  : 1995    Patient attended the phone/video session alone.    Last seen for outpatient psychiatry Return Visit on 24.      FOLLOWING PLAN PUT INTO PLACE: yes    INTERIM HISTORY     COMMUNICATIONS FROM PATIENT VIA:  none      RECORDS AVAILABLE FOR REVIEW: EHR records through [employer] .   HISTORY OF PRESENT ILLNESS   Reports past diagnosis of: substance abuse disorder, generalized anxiety,      Seeing Francisco a bit over a year now. Narrowing goals for treatment. Barriers to improvement  Would like to address baseline depression and meth use.   First sought treatment: in late childhood. Saw therapy at age 8 and 11-also had psych meds and stopped psychotherapist around 14 and continued medsf ora few years. Therapisy in mid 20s.   Since then recovery at 24. Substance abuse treatment centers. Before francisco in DBT.   Couple of hospitalizations:   First at 11: 3 day thing.   Inpatient 12 (3 day eval after pretending suidicde attempt) and one at 13 (SIB).   Has community support. Closest friends in [facility] in active use.   Boyfriend in recovery   Had a sponsor and was fired.      Sid Miranda is a 28 year old White Not  or  male presenting for psychiatric evaluation and medication management. Information is obtained from patient and available records.  Reports history of    Generalized anxiety disorder  Depression, unspecified depression type  Post-traumatic stress disorder  Severe stimulant use disorder (H)   Previously psychiatrically hospitalized during adolescence various times and chemical dependency treatment. History of IOP as well.  Hx of suicidal ideation and attempts.   Hx of  self-injurious behaviors in the form of cutting starting at age middle school.  Genetically loaded for  substance use, schizophrenia. Grew up in an intact home with all basic needs being met.     Sid was seen today for consult.     His stimulant use anxiety and depression are not controlled it is reassuring he is in therapy is currently working with therapist for harm reduction with stimulant use disorder.  I am prescribing Lexapro 5 mg to address anxiety and depression to better overall mood.  Follow-up in 1 month.      Today: 6/27/24:      Started lexapro briefly, get in next week with primary, imitrex prescribed for migraines no more episodes, end of that week. Use episide for one day. Then over. Restarted lexapro for 3 weeks. Spoke with brenda. Increase depression. Couple other variables: Money issues and other variables impacting depression. Interested in naltrexone for substance use.  Sid Miranda is a 28 year old White Not  or  male presenting for psychiatric evaluation and medication management. Information is obtained from patient and available records.  Reports history of    Generalized anxiety disorder  Depression, unspecified depression type  Post-traumatic stress disorder  Severe stimulant use disorder (H)   Previously psychiatrically hospitalized during adolescence various times and chemical dependency treatment. History of IOP as well.  Hx of suicidal ideation and attempts.   Hx of self-injurious behaviors in the form of cutting starting at age middle school.  Genetically loaded for  substance use, schizophrenia. Grew up in an intact home with all basic needs being met.     Sid was seen today for consult.     His stimulant use anxiety and depression are not controlled it is reassuring he is in therapy is currently working with therapist for harm reduction with stimulant use disorder.  I am prescribing Lexapro 5 mg to address anxiety and depression to better overall mood.  Follow-up in  1 month.   Diagnoses and all orders for this visit:     6/27/24:  Sid was seen today for recheck.  Prescribed naltrexone for stimulant use. Advised him he may experience nausea and gave him zofran as needed. Increased lexapro to 10mg to address depression and anxiety. Spent time talking about safety due to passive thoughts of suicidal ideation and talked about resources if he needed them. Follow up in a month.      Today: 7/11/24: new dose of lexapro is fine. Naltrexone notes no side effects.   Reports suicidal thoughts. Most days have been better. Today, lower mood kind of tired. Contracts for safety.     Sid was seen today for recheck.  Provied refills of medications. Offered to increase lexapro to 15mg due to continued depressive symptoms. Anxiety not controlled. Follow up in a month.      Today: 8/22/24:   Notes things are going pretty well. New job at [employer]. Has a pull toward nonprofit work. Notes experiencing scattered nightmares a couple times a week he remembers it.  Wakes up at 1am and walks around and wonders if it's happening more than once a week. Sleep has been segmented. Naltrexone going well no nausea. Downtick in cravings.     Today: 11/6/24: Doing well. No concerns at this time.     FAMILY, MEDICAL, SURGICAL HISTORY REVIEWED.  MEDICATION HAVE BEEN REVIEWED AND ARE CURRENT TO THE BEST OF MY KNOWLEDGE AND ABILITY.    MEDICATIONS                                                                                                Current Outpatient Medications   Medication Sig Dispense Refill    escitalopram (LEXAPRO) 10 MG tablet Take 1 tablet (10 mg) by mouth daily. 30 tablet 0    escitalopram (LEXAPRO) 5 MG tablet Take 1 tablet (5 mg) by mouth daily. 30 tablet 0    naltrexone (DEPADE/REVIA) 50 MG tablet Take 1 tablet (50 mg) by mouth daily. 30 tablet 0    ondansetron (ZOFRAN ODT) 4 MG ODT tab Take 1 tablet (4 mg) by mouth every 8 hours as needed for nausea 30 tablet 0     No current  "facility-administered medications for this visit.           Past Psychotropic Medications   Zoloft (sertraline) and Wellbutrin / Zyban / Aplenzin (bupropion)  Lithobid / Eskalith (lithium), Lamictal (lamotrigine), and Tegretol (carbamazepine)  Risperdal (risperidone) and Abilify (aripriprazole)       Medication Max Dose (mg) Dates / Duration Helpful? DC Reason / Adverse Effects?   wellbutrin  8/24-9/24  Anxiety, used as tool to recover from past methamphetamine use                                                                       PSYCHOTROPIC DRUG INTERACTIONS                                         none    MANAGEMENT:  N/A    TODAY PATIENT REPORTS THE FOLLOWING PSYCHIATRIC ROS:       EXERCISE: Adequate  SIDE EFFECTS:  tolerating medications without reported side effects  COMPLIANCE:  states Adherent to medication regimen  REPORTS THE FOLLOWING NEW MEDICAL ISSUES:  none    PROBLEM: DEPRESSION: Improving        No data to display                   No data to display              PHQ9 score is Not completed today  Suicidal ideation:  No     PROBLEM: ANXIETY: Improving.   GAD7 score is Not completed today      4/4/2023     8:55 AM   LORENZO-7 SCORE   Total Score 6 (mild anxiety)   Total Score 6    6       PROBLEM: CHRONIC SUICIDAL IDEATIONS: current: No     PROBLEM: SLEEP/INSOMNIA: Stable.     PERTINENT PAST MEDICAL AND SURGICAL HISTORY   No past medical history on file.    VITALS     BP Readings from Last 1 Encounters:   05/22/24 119/58     Pulse Readings from Last 1 Encounters:   05/22/24 68     Wt Readings from Last 1 Encounters:   11/06/24 86.2 kg (190 lb)     Ht Readings from Last 1 Encounters:   11/06/24 1.854 m (6' 1\")     Estimated body mass index is 25.07 kg/m  as calculated from the following:    Height as of this encounter: 1.854 m (6' 1\").    Weight as of this encounter: 86.2 kg (190 lb).    ALLERGY & IMMUNIZATIONS     No Known Allergies    MEDICAL REVIEW OF SYSTEMS:   Ten system review was completed with " pertinent positives noted     MENTAL STATUS EXAM:   General/Constitutional:  Appearance:  awake, alert, adequately groomed, appeared stated age and no apparent distress  Attitude:   cooperative   Eye Contact:  good  Musculoskeletal:  Psychomotor Behavior:  no evidence of tardive dyskinesia, dystonia, or tics from the head up  Psychiatric:  Speech:  clear, coherent, regular rate, rhythm, and volume,  No pressure speech noted.  Associations:  no loose associations  Thought Process:  logical, linear and goal oriented  Thought Content:   No evidence of suicidal ideation or homicidal ideation, no evidence of psychotic thought, no auditory hallucinations present and no visual hallucinations present  Mood:  good  Affect:  full range/stable (normal variation of emotions during exam) and was congruent to speech content.  Insight:  good  Judgment:  intact, adequate for safety  Impulse Control:  intact  Neurological:  Oriented to:  person, place, time, and situation  Attention Span and Concentration:  Able to attend to the interview     Language: intact    Recent and Remote Memory:  Intact to interview. Not formally assessed. No amnesia.   Fund of Knowledge: appropriate       DSM 5 DIAGNOSIS:      Depression, unspecified depression type  Generalized anxiety disorder  Post-traumatic stress disorder     MEDICAL COMORBIDITY IMPACTING CLINICAL PICTURE: None noted.  Known issue that I take into account for their medical decisions, no current exacerbations or new concerns      ASSESSMENT AND PLAN      Sid was seen today for recheck.  Conditions better controlled. No refills at this time. Continuing lexapro and naltrexone.   Diagnoses and all orders for this visit:    Depression, unspecified depression type    Generalized anxiety disorder    Post-traumatic stress disorder    Follow up: 1 month    HIGH RISK MEDICATION:  No     Administrative/Billing:   The longitudinal plan of care for the diagnosis(es)/condition(s) as documented  were addressed during this visit. Due to the added complexity in care, I will continue to support Sid in the subsequent management and with ongoing continuity of care.        Patient Status:  Patient will continue to be seen for ongoing consultation and stabilization.    Signed:   Neville West PA-C

## 2024-11-06 NOTE — NURSING NOTE
Current patient location: [address] [address]    Is the patient currently in the state of [place]? YES    Visit mode:VIDEO    If the visit is dropped, the patient can be reconnected by: VIDEO VISIT: Text to cell phone:   Telephone Information:   Mobile [phone]       Will anyone else be joining the visit? NO  (If patient encounters technical issues they should call [phone]    Are changes needed to the allergy or medication list? No    Are refills needed on medications prescribed by this physician? NO    Rooming Documentation:  Not applicable    Reason for visit: RECHECK    Emilie KOOF

## 2024-12-02 ENCOUNTER — VIRTUAL VISIT (OUTPATIENT)
Dept: PSYCHOLOGY | Facility: CLINIC | Age: 29
End: 2024-12-02
Payer: COMMERCIAL

## 2024-12-02 DIAGNOSIS — F15.20 SEVERE STIMULANT USE DISORDER (H): ICD-10-CM

## 2024-12-02 DIAGNOSIS — F33.1 MODERATE EPISODE OF RECURRENT MAJOR DEPRESSIVE DISORDER (H): ICD-10-CM

## 2024-12-02 DIAGNOSIS — F43.10 POST-TRAUMATIC STRESS DISORDER: ICD-10-CM

## 2024-12-02 DIAGNOSIS — F41.1 GENERALIZED ANXIETY DISORDER: Primary | ICD-10-CM

## 2024-12-02 PROCEDURE — 90837 PSYTX W PT 60 MINUTES: CPT | Mod: 95 | Performed by: STUDENT IN AN ORGANIZED HEALTH CARE EDUCATION/TRAINING PROGRAM

## 2024-12-02 NOTE — PROGRESS NOTES
[place] [facility] - Progress Note    Date of Service: 24   Name: Sid Miranda  : 1995  Medical Record Number: [id number]  Treating Provider: Francisco Cannon, Ph.D,     Type of Session: Individual  Present in Session: Client and therapist  Session Start and Stop Time: 11:02-12:02pm  Number of Minutes:  60    SERVICE MODALITY:  Video Visit:      Provider verified identity through the following two step process.  Patient provided:  Patient is known previously to provider and Patient was verified at admission/transfer    Telemedicine Visit: The patient's condition can be safely assessed and treated via synchronous audio and visual telemedicine encounter.      Reason for Telemedicine Visit: Patient has requested telehealth visit    Originating Site (Patient Location): Patient's home    Distant Site (Provider Location): [facility]    Consent:  The patient/guardian has verbally consented to: the potential risks and benefits of telemedicine (video visit) versus in person care; bill my insurance or make self-payment for services provided; and responsibility for payment of non-covered services.     Patient would like the video invitation sent by:  My Chart    Mode of Communication:  Video Conference via [employer]    Distant Location (Provider):  On-site    As the provider I attest to compliance with applicable laws and regulations related to telemedicine.      DSM-5 Diagnoses:  Encounter Diagnoses   Name Primary?    Generalized anxiety disorder Yes    Moderate episode of recurrent major depressive disorder (H)     Severe stimulant use disorder (H)     Post-traumatic stress disorder        Current Reported Symptoms and Status update:  Changes since last session includes some anxiety/worry particularly about his relationship; improvement in depression; maintained abstinence from meth approx 2 months; improvement in PTSD symptoms.    Post Traumatic Stress  Disorder: Client reported a history of traumatic events meeting criteria for diagnosis. Client experiences recurrent memories, troubling dreams, physiological reactivity in response to memories/reminders of traumatic events, and trauma-related avoidance.    Generalized Anxiety Disorder: Excessive anxiety and worry about a number of events or activities (such as work or school performance); difficulty controlling worry; restlessness or feeling keyed up or on edge; difficulty concentrating or mind going blank; irritability.      Depression: Client reports hopelessness, depressed mood, irritability, anhedonia, low self-worth, isolation, fatigue, poor sleep, decreased functioning.      Severe Stimulant Use Disorder: Client has a history of methamphetamine use that significantly negatively impacts functioning, including financial stress, loss of employment, and loss of relationships. He continued to use despite negative consequences. Client has a history of sexual behavior he describes as compulsive when using meth. Further assessment needed to determine if compulsive sexual behaviors also occur separately from meth use.     Progress Toward Treatment Goals:   Satisfactory progress     Therapeutic Interventions/Treatment Strategies:    Area(s) of treatment focus addressed in this session included Symptom Management, Interpersonal Relationship Skills, and Sexual Health and Wellness    Client processed recent stressors in his relationship with Tip and with his friend. We discussed ways in which client effectively implemented boundaries for himself to stay aligned with his sobriety, relationship, and emotional-regulation goals. We also explored client's emotional responses to these stressors. Client noted recurring nightmares. We collaboratively planned to more thoroughly assess impact on functioning and potential contributing factors. We planned to review client's treatment plan in future follow-up.    Psychotherapist  offered support, feedback and validation and reinforced use of skills Treatment modalities used include Cognitive Behavioral Therapy  Support and Feedback    Patient Response:   Patient responded to session by accepting feedback, giving feedback, listening, focusing on goals, and accepting support  Possible barriers to participation / learning include: N/A    Current Mental Status Exam:   Appearance:  Appropriate   Eye Contact:  Good   Attitude / Demeanor: Friendly  Speech      Rate / Production: Normal/ Responsive      Volume:  Normal  volume  Orientation:  All  Mood:   Normal  Affect:   Appropriate   Thought Content: Clear   Insight:   Good     Plan/Need for Future Services:  Return for therapy in 1 week to treat diagnosed problems.    Patient has a current master individualized treatment plan.  See Epic treatment plan for more information.    Referral / Collaboration:  Referral to another professional/service is not indicated at this time..  Emergency Services Needed?  No    Assignment:  Questionnaires sent to pt  Consider updates to treatment plan/goals    Interactive Complexity:  There are four specific communication difficulties that complicate the work of the primary psychiatric procedure.  Interactive complexity (+82759) may be reported when at least one of these difficulties is present.    Communication difficulties present during current the psychiatric procedure include:  None.        Francisco Cannon, PhD, LP    [place] [facility], [facility]  Department of Family Medicine and Community Health  [employer]

## 2024-12-02 NOTE — NURSING NOTE
Current patient location: [address] [address]    Is the patient currently in the state of [place]? YES    Visit mode:VIDEO    If the visit is dropped, the patient can be reconnected by:VIDEO VISIT: Send to e-mail at: [email or web address]    Will anyone else be joining the visit? NO  (If patient encounters technical issues they should call [phone]    Are changes needed to the allergy or medication list? N/A    Are refills needed on medications prescribed by this physician? NO    Rooming Documentation:  Not applicable    Reason for visit: RECHECK    Emilie CARLTON

## 2024-12-09 ENCOUNTER — VIRTUAL VISIT (OUTPATIENT)
Dept: PSYCHOLOGY | Facility: CLINIC | Age: 29
End: 2024-12-09
Payer: COMMERCIAL

## 2024-12-09 DIAGNOSIS — F43.10 POST-TRAUMATIC STRESS DISORDER: ICD-10-CM

## 2024-12-09 DIAGNOSIS — F41.1 GENERALIZED ANXIETY DISORDER: Primary | ICD-10-CM

## 2024-12-09 DIAGNOSIS — F15.20 SEVERE STIMULANT USE DISORDER (H): ICD-10-CM

## 2024-12-09 DIAGNOSIS — F33.1 MODERATE EPISODE OF RECURRENT MAJOR DEPRESSIVE DISORDER (H): ICD-10-CM

## 2024-12-09 NOTE — PROGRESS NOTES
[place] [facility] - Progress Note    Date of Service: 24   Name: Sid Miranda  : 1995  Medical Record Number: [id number]  Treating Provider: Francisco Cannon, Ph.D,     Type of Session: Individual  Present in Session: Client and therapist  Session Start and Stop Time: 10:02am-10:55am  Number of Minutes:  53    SERVICE MODALITY:  Video Visit:      Provider verified identity through the following two step process.  Patient provided:  Patient is known previously to provider and Patient was verified at admission/transfer    Telemedicine Visit: The patient's condition can be safely assessed and treated via synchronous audio and visual telemedicine encounter.      Reason for Telemedicine Visit: Patient has requested telehealth visit    Originating Site (Patient Location): Patient's home    Distant Site (Provider Location): [facility]    Consent:  The patient/guardian has verbally consented to: the potential risks and benefits of telemedicine (video visit) versus in person care; bill my insurance or make self-payment for services provided; and responsibility for payment of non-covered services.     Patient would like the video invitation sent by:  My Chart    Mode of Communication:  Video Conference via [employer]    Distant Location (Provider):  On-site    As the provider I attest to compliance with applicable laws and regulations related to telemedicine.      DSM-5 Diagnoses:  Encounter Diagnoses   Name Primary?    Generalized anxiety disorder Yes    Moderate episode of recurrent major depressive disorder (H)     Severe stimulant use disorder (H)     Post-traumatic stress disorder          Current Reported Symptoms and Status update:  Changes since last session includes some anxiety, worry, an distress, depression improved with treatment, no recent meth use, trauma-related activation improved.    Post Traumatic Stress Disorder: Client reported a  history of traumatic events meeting criteria for diagnosis. Client experiences recurrent memories, troubling dreams, physiological reactivity in response to memories/reminders of traumatic events, and trauma-related avoidance.    Generalized Anxiety Disorder: Excessive anxiety and worry about a number of events or activities (such as work or school performance); difficulty controlling worry; restlessness or feeling keyed up or on edge; difficulty concentrating or mind going blank; irritability.      Depression: Client reports hopelessness, depressed mood, irritability, anhedonia, low self-worth, isolation, fatigue, poor sleep, decreased functioning.      Severe Stimulant Use Disorder: Client has a history of methamphetamine use that significantly negatively impacts functioning, including financial stress, loss of employment, and loss of relationships. He continued to use despite negative consequences. Client has a history of sexual behavior he describes as compulsive when using meth. Further assessment needed to determine if compulsive sexual behaviors also occur separately from meth use.     Progress Toward Treatment Goals:   Satisfactory progress     Therapeutic Interventions/Treatment Strategies:    Area(s) of treatment focus addressed in this session included Symptom Management, Interpersonal Relationship Skills, and Sexual Health and Wellness    Client processed recent stressors and interpersonal dynamics with a friend struggling with significant mental health concerns. Therapist provided psychoeducation on mental health symptoms and potential impacts on client. Client processed recent nightmares. We explored themes around being judged and rejected. We also discussed potential stressors contributing to activation of nightmares. Encouraged client to write out his dreams and write a new ending to rehearse and try to reduce distress.     Psychotherapist offered support, feedback and validation and reinforced use  of skills Treatment modalities used include Cognitive Behavioral Therapy  Support, Feedback, and Clarification    Patient Response:   Patient responded to session by accepting feedback, giving feedback, listening, focusing on goals, and accepting support  Possible barriers to participation / learning include: N/A    Current Mental Status Exam:   Appearance:  Appropriate   Eye Contact:  Good   Attitude / Demeanor: Friendly  Speech      Rate / Production: Normal/ Responsive      Volume:  Normal  volume  Orientation:  All  Mood:   Normal  Affect:   Appropriate   Thought Content: Clear   Insight:   Good     Plan/Need for Future Services:  Return for therapy in 1 week to treat diagnosed problems.    Patient has a current master individualized treatment plan.  See Epic treatment plan for more information.    Referral / Collaboration:  Referral to another professional/service is not indicated at this time..  Emergency Services Needed?  No    Assignment:  Write and rescript nightmares    Interactive Complexity:  There are four specific communication difficulties that complicate the work of the primary psychiatric procedure.  Interactive complexity (+50046) may be reported when at least one of these difficulties is present.    Communication difficulties present during current the psychiatric procedure include:  None.      Signature/Title:    Francisco Cannon, PhD, LP    [place] [facility], [facility]  Department of Family Medicine and Community Health  [employer]

## 2024-12-09 NOTE — NURSING NOTE
Current patient location: [address] [address]    Is the patient currently in the state of [place]? YES    Visit mode:VIDEO    If the visit is dropped, the patient can be reconnected by:VIDEO VISIT: Send to e-mail at: [email or web address]    Will anyone else be joining the visit? NO  (If patient encounters technical issues they should call [phone]    Are changes needed to the allergy or medication list? N/A    Are refills needed on medications prescribed by this physician? NO    Rooming Documentation:  Not applicable    Reason for visit: SHERRY CARLTON

## 2024-12-11 ENCOUNTER — VIRTUAL VISIT (OUTPATIENT)
Dept: PSYCHIATRY | Facility: CLINIC | Age: 29
End: 2024-12-11
Payer: COMMERCIAL

## 2024-12-11 DIAGNOSIS — F41.1 GENERALIZED ANXIETY DISORDER: ICD-10-CM

## 2024-12-11 DIAGNOSIS — F32.A DEPRESSION, UNSPECIFIED DEPRESSION TYPE: ICD-10-CM

## 2024-12-11 DIAGNOSIS — F43.10 POST-TRAUMATIC STRESS DISORDER: Primary | ICD-10-CM

## 2024-12-11 DIAGNOSIS — F15.20 SEVERE STIMULANT USE DISORDER (H): ICD-10-CM

## 2024-12-11 RX ORDER — PRAZOSIN HYDROCHLORIDE 1 MG/1
1 CAPSULE ORAL AT BEDTIME
Qty: 30 CAPSULE | Refills: 0 | Status: SHIPPED | OUTPATIENT
Start: 2024-12-11 | End: 2025-01-10

## 2024-12-11 RX ORDER — ESCITALOPRAM OXALATE 10 MG/1
10 TABLET ORAL DAILY
Qty: 30 TABLET | Refills: 0 | Status: SHIPPED | OUTPATIENT
Start: 2024-12-11

## 2024-12-11 RX ORDER — NALTREXONE HYDROCHLORIDE 50 MG/1
50 TABLET, FILM COATED ORAL DAILY
Qty: 30 TABLET | Refills: 0 | Status: SHIPPED | OUTPATIENT
Start: 2024-12-11

## 2024-12-11 RX ORDER — ESCITALOPRAM OXALATE 5 MG/1
5 TABLET ORAL DAILY
Qty: 30 TABLET | Refills: 0 | Status: SHIPPED | OUTPATIENT
Start: 2024-12-11

## 2024-12-11 ASSESSMENT — PAIN SCALES - GENERAL: PAINLEVEL_OUTOF10: NO PAIN (0)

## 2024-12-11 NOTE — PROGRESS NOTES
Virtual Visit Details    Type of service:  Video Visit   Video Start Time:  9A  Video End Time: 9:15A    Originating Location (pt. Location): Home    Distant Location (provider location):  Off-site  Platform used for Video Visit: [facility] TEAM:    [facility]  Therapist- Francisco Cannon    Sid is a 29 year old who uses the pronouns he, him, his, himself.      Diagnoses        Post-traumatic stress disorder  Generalized anxiety disorder  Depression, unspecified depression type  Severe stimulant use disorder (H)     Assessment     Sid was seen today for a follow-up.  His PTSD is not controlled as he is 5 been experiencing nightmares that cause him to wake up in the middle the night we did discuss adding prazosin to help with this.       Future Considerations: Increasing prazosin to a therapeutic level.     Psychotropic Drug Interactions:   none  Management: N/A    [place]: not using controlled substances    Risk Statements:   Treatment Risk- Risks, benefits, alternatives and potential adverse effects have been discussed and are understood.   Safety Risk-Sid Lau did not appear to be an imminent safety risk to self or others.     Plan     1) Medications:   - START prazosin 1mg at bedtime  - Continue lexapro 15mg.   - Continue naltrexone 50mg    2) Psychotherapy: continue    3) Next due:  Labs- Routine monitoring is not indicated for current psychotropic medication regimen   EKG- Routine monitoring is not indicated for current psychotropic medication regimen   Rating scales- none needed    4) Referrals: none    5) Other: none    6) Follow-up: Return to clinic in 4 weeks.        Pertinent Background                                                   [most recent eval 12/11/24]   Established care on 5/22/24  Reports past diagnosis of: substance abuse disorder, generalized anxiety,      Seeing Francisco a bit over a year now. Narrowing goals for treatment. Barriers to  improvement  Would like to address baseline depression and meth use.   First sought treatment: in late childhood. Saw therapy at age 8 and 11-also had psych meds and stopped psychotherapist around 14 and continued medsf ora few years. Therapisy in mid 20s.   Since then recovery at 24. Substance abuse treatment centers. Before brenda in DBT.   Couple of hospitalizations:   First at 11: 3 day thing.   Inpatient 12 (3 day eval after pretending suidicde attempt) and one at 13 (SIB).   Has community support. Closest friends in [facility] in active use.   Boyfriend in recovery   Had a sponsor and was fired.      Sid Miranda is a 28 year old White Not  or  male presenting for psychiatric evaluation and medication management. Information is obtained from patient and available records.  Reports history of    Generalized anxiety disorder  Depression, unspecified depression type  Post-traumatic stress disorder  Severe stimulant use disorder (H)   Previously psychiatrically hospitalized during adolescence various times and chemical dependency treatment. History of IOP as well.  Hx of suicidal ideation and attempts.   Hx of self-injurious behaviors in the form of cutting starting at age middle school.  Genetically loaded for  substance use, schizophrenia. Grew up in an intact home with all basic needs being met.     Sid was seen today for consult.     His stimulant use anxiety and depression are not controlled it is reassuring he is in therapy is currently working with therapist for harm reduction with stimulant use disorder.  I am prescribing Lexapro 5 mg to address anxiety and depression to better overall mood.  Follow-up in 1 month.      Today: 6/27/24:      Started lexapro briefly, get in next week with primary, imitrex prescribed for migraines no more episodes, end of that week. Use episide for one day. Then over. Restarted lexapro for 3 weeks. Spoke with brenda. Increase depression. Couple other variables:  Money issues and other variables impacting depression. Interested in naltrexone for substance use.  Sid was seen today for recheck.  Prescribed naltrexone for stimulant use. Advised him he may experience nausea and gave him zofran as needed. Increased lexapro to 10mg to address depression and anxiety. Spent time talking about safety due to passive thoughts of suicidal ideation and talked about resources if he needed them. Follow up in a month.      7/11/24: new dose of lexapro is fine. Naltrexone notes no side effects.   Reports suicidal thoughts. Most days have been better. Today, lower mood kind of tired. Contracts for safety.     Sid was seen today for recheck.  Provied refills of medications. Offered to increase lexapro to 15mg due to continued depressive symptoms. Anxiety not controlled. Follow up in a month.     8/22/24:   Notes things are going pretty well. New job at [employer]. Has a pull toward nonprofit work. Notes experiencing scattered nightmares a couple times a week he remembers it.  Wakes up at 1am and walks around and wonders if it's happening more than once a week. Sleep has been segmented. Naltrexone going well no nausea. Downtick in cravings.   Sid was seen today for recheck.  No adjistments needed to lexapro. Tolerating naltrexone well. Introduced the idea of prazosin for nightmares. He is willing to do some research on this topic. Conditions better controlled.        11/6/24: Doing well. No concerns at this time.      Sid was seen today for recheck.  Conditions better controlled. No refills at this time. Continuing lexapro and naltrexone.        Subjective     Since the last visit:   - decent, not quite as shiny. Get a couple months without regular consistent sleep. Taking medications and notes vivid dreams that wake him up.     Current Social History:  Financial/occupational: employed  Living situation (partner, children, pets, etc): not assessed  Social/spiritual support:  yes  Feels safe at home: not assessed    Medical Review of Systems:   Lightheadedness/orthostasis: None  Headaches: None  GI: none  Sexual health concerns: None       Mental Status Exam     General/Constitutional:  Appearance:  awake, alert, adequately groomed, appeared stated age and no apparent distress  Attitude:   cooperative   Eye Contact:  good  Musculoskeletal:  Psychomotor Behavior:  no evidence of tardive dyskinesia, dystonia, or tics from the head up  Psychiatric:  Speech:  clear, coherent, regular rate, rhythm, and volume,  No pressure speech noted.  Associations:  no loose associations  Thought Process:  logical, linear and goal oriented  Thought Content:   No evidence of suicidal ideation or homicidal ideation, no evidence of psychotic thought, no auditory hallucinations present and no visual hallucinations present  Mood:  good  Affect:  full range/stable (normal variation of emotions during exam) and was congruent to speech content.  Insight:  good  Judgment:  intact, adequate for safety  Impulse Control:  intact  Neurological:  Oriented to:  person, place, time, and situation  Attention Span and Concentration:  Able to attend to the interview     Language: intact    Recent and Remote Memory:  Intact to interview. Not formally assessed. No amnesia.   Fund of Knowledge: appropriate         Past Psych Med Trials      Medication Max Dose (mg) Dates / Duration Helpful? DC Reason / Adverse Effects?   wellbutrin   denies Induced anxiety   zoloft   denies    lithium   denies    risperdal   denies    abilify   denies    tegredol   denies                            Treatment Course and Terrell Events since  JULY 2023       Established care on 5/22/24: His stimulant use anxiety and depression are not controlled it is reassuring he is in therapy is currently working with therapist for harm reduction with stimulant use disorder.  I am prescribing Lexapro 5 mg to address anxiety and depression to better overall mood.   Follow-up in 1 month.       6/27/24:Prescribed naltrexone for stimulant use. Advised him he may experience nausea and gave him zofran as needed. Increased lexapro to 10mg to address depression and anxiety. Spent time talking about safety due to passive thoughts of suicidal ideation and talked about resources if he needed them.      Today: 7/11/24: new dose of lexapro is fine. Naltrexone notes no side effects.   Reports suicidal thoughts. Most days have been better. Today, lower mood kind of tired. Contracts for safety.vProvied refills of medications. Offered to increase lexapro to 15mg due to continued depressive symptoms. Anxiety not controlled.    Today: 8/22/24: Notes things are going pretty well. New job at [employer]. Has a pull toward nonprofit work. Notes experiencing scattered nightmares a couple times a week he remembers it.  Wakes up at 1am and walks around and wonders if it's happening more than once a week. Sleep has been segmented. Naltrexone going well no nausea. Downtick in cravings. No adjustments needed to lexapro. Tolerating naltrexone well. Introduced the idea of prazosin for nightmares. He is willing to do some research on this topic. Conditions better controlled.        Vitals   There were no vitals taken for this visit.  Pulse Readings from Last 3 Encounters:   05/22/24 68     Wt Readings from Last 3 Encounters:   11/06/24 86.2 kg (190 lb)   07/11/24 81.6 kg (180 lb)   05/22/24 83.1 kg (183 lb 3.2 oz)     BP Readings from Last 3 Encounters:   05/22/24 119/58        Medical History     ALLERGIES: Patient has no known allergies.    There is no problem list on file for this patient.       Medications     Current Outpatient Medications   Medication Sig Dispense Refill    escitalopram (LEXAPRO) 10 MG tablet Take 1 tablet (10 mg) by mouth daily. 30 tablet 0    escitalopram (LEXAPRO) 5 MG tablet Take 1 tablet (5 mg) by mouth daily. 30 tablet 0    naltrexone (DEPADE/REVIA) 50 MG tablet Take 1  tablet (50 mg) by mouth daily. 30 tablet 0    ondansetron (ZOFRAN ODT) 4 MG ODT tab Take 1 tablet (4 mg) by mouth every 8 hours as needed for nausea 30 tablet 0        Labs and Data         3/21/2023     8:44 AM 5/23/2024    11:25 AM   PROMIS-10 Total Score w/o Sub Scores   PROMIS TOTAL - SUBSCORES 27 24    24         3/21/2023     8:44 AM   CAGE-AID Total Score   Total Score 4   Total Score MyChart 4 (A total score of 2 or greater is considered clinically significant)          No data to display                  4/4/2023     8:55 AM   LORENZO-7 SCORE   Total Score 6 (mild anxiety)   Total Score 6    6       Liver/Kidney Function, TSH Metabolic Blood counts   No lab results found.  No lab results found. No lab results found.  No lab results found.  No lab results found. No lab results found.      .g  Administrative/Billing:  The longitudinal plan of care for the diagnosis(es)/condition(s) as documented were addressed during this visit. Due to the added complexity in care, I will continue to support Sid in the subsequent management and with ongoing continuity of care.   PROVIDER: Neville West PA-C

## 2024-12-11 NOTE — NURSING NOTE
Current patient location: [address] [address]    Is the patient currently in the state of [place]? YES    Visit mode:VIDEO    If the visit is dropped, the patient can be reconnected by:VIDEO VISIT: Text to cell phone:   Telephone Information:   Mobile [phone]       Will anyone else be joining the visit? NO  (If patient encounters technical issues they should call [phone])    Are changes needed to the allergy or medication list? Pt stated no changes to allergies and Pt stated no med changes    Are refills needed on medications prescribed by this physician? Discuss with provider    Rooming Documentation:  Not applicable    Reason for visit: SHERRY CARLTON

## 2024-12-16 ENCOUNTER — VIRTUAL VISIT (OUTPATIENT)
Dept: PSYCHOLOGY | Facility: CLINIC | Age: 29
End: 2024-12-16
Payer: COMMERCIAL

## 2024-12-16 DIAGNOSIS — F41.1 GENERALIZED ANXIETY DISORDER: Primary | ICD-10-CM

## 2024-12-16 DIAGNOSIS — F43.10 POST-TRAUMATIC STRESS DISORDER: ICD-10-CM

## 2024-12-16 DIAGNOSIS — F15.20 SEVERE STIMULANT USE DISORDER (H): ICD-10-CM

## 2024-12-16 DIAGNOSIS — F33.1 MODERATE EPISODE OF RECURRENT MAJOR DEPRESSIVE DISORDER (H): ICD-10-CM

## 2024-12-16 NOTE — NURSING NOTE
Current patient location: [address] [address]    Is the patient currently in the state of [place]? YES    Visit mode:VIDEO      If the visit is dropped, the patient can be reconnected by:VIDEO VISIT: Text to cell phone:   Telephone Information:   Mobile [phone]    and VIDEO VISIT: Send to e-mail at: [email or web address]    Will anyone else be joining the visit? NO  (If patient encounters technical issues they should call [phone])    Are changes needed to the allergy or medication list? No    Are refills needed on medications prescribed by this physician? NO    Rooming Documentation:  Questionnaire(s) not done per department protocol    Reason for visit: RECHECK    Mildred CARLTON

## 2024-12-16 NOTE — PROGRESS NOTES
[place] [facility] - Progress Note    Date of Service: 24   Name: Sid Miranda  : 1995  Medical Record Number: [id number]  Treating Provider: Francisco Cannon, Ph.D,   Type of Session: Individual  Present in Session: Client and therapist  Session Start and Stop Time: 10:01am-10:55am  Number of Minutes:  54    SERVICE MODALITY:  Video Visit:      Provider verified identity through the following two step process.  Patient provided:  Patient is known previously to provider and Patient was verified at admission/transfer    Telemedicine Visit: The patient's condition can be safely assessed and treated via synchronous audio and visual telemedicine encounter.      Reason for Telemedicine Visit: Patient has requested telehealth visit    Originating Site (Patient Location): Patient's home    Distant Site (Provider Location): [facility]    Consent:  The patient/guardian has verbally consented to: the potential risks and benefits of telemedicine (video visit) versus in person care; bill my insurance or make self-payment for services provided; and responsibility for payment of non-covered services.     Patient would like the video invitation sent by:  My Chart    Mode of Communication:  Video Conference via [employer]    Distant Location (Provider):  On-site    As the provider I attest to compliance with applicable laws and regulations related to telemedicine.      DSM-5 Diagnoses:  Encounter Diagnoses   Name Primary?    Generalized anxiety disorder Yes    Moderate episode of recurrent major depressive disorder (H)     Post-traumatic stress disorder     Severe stimulant use disorder (H)        Current Reported Symptoms and Status update:  Changes since last session includes some nightmares/bad dreams contributing to stress and poorer sleep quality, some trauma-related activation, anxiety and worry, improvement in depressive symptoms, maintained sobriety  from meth.    Post Traumatic Stress Disorder: Client reported a history of traumatic events meeting criteria for diagnosis. Client experiences recurrent memories, troubling dreams, physiological reactivity in response to memories/reminders of traumatic events, and trauma-related avoidance.    Generalized Anxiety Disorder: Excessive anxiety and worry about a number of events or activities (such as work or school performance); difficulty controlling worry; restlessness or feeling keyed up or on edge; difficulty concentrating or mind going blank; irritability.      Depression: Client reports hopelessness, depressed mood, irritability, anhedonia, low self-worth, isolation, fatigue, poor sleep, decreased functioning.      Severe Stimulant Use Disorder: Client has a history of methamphetamine use that significantly negatively impacts functioning, including financial stress, loss of employment, and loss of relationships. He continued to use despite negative consequences. Client has a history of sexual behavior he describes as compulsive when using meth. Further assessment needed to determine if compulsive sexual behaviors also occur separately from meth use.       Progress Toward Treatment Goals:   Satisfactory progress     Therapeutic Interventions/Treatment Strategies:    Area(s) of treatment focus addressed in this session included Symptom Management, Interpersonal Relationship Skills, and Sexual Health and Wellness    Client processed recent dreams and how they are related to his recovery, mental health, and sleep quality. We discussed continuing to work on re-scripting the narrative. He also provided updates on relationship functioning and sexual intimacy with his partner. The focus of today's session was on assessing current functioning and updating treatment plan (see below).     Psychotherapist offered support, feedback and validation and reinforced use of skills Treatment modalities used include Cognitive  Behavioral Therapy  Support and Feedback    Patient Response:   Patient responded to session by accepting feedback, giving feedback, listening, focusing on goals, and accepting support  Possible barriers to participation / learning include: N/A    Current Mental Status Exam:   Appearance:  Appropriate   Eye Contact:  Good   Attitude / Demeanor: Friendly  Speech      Rate / Production: Normal/ Responsive      Volume:  Normal  volume  Orientation:  All  Mood:   Normal  Affect:   Appropriate   Thought Content: Clear   Insight:   Good         Plan/Need for Future Services:  Return for therapy in 1 week to treat diagnosed problems.    Patient has a current master individualized treatment plan.  See Epic treatment plan for more information.    Referral / Collaboration:  Referral to another professional/service is not indicated at this time..  Emergency Services Needed?  No    Assignment:  Continue scripting dreams    Interactive Complexity:  There are four specific communication difficulties that complicate the work of the primary psychiatric procedure.  Interactive complexity (+42888) may be reported when at least one of these difficulties is present.    Communication difficulties present during current the psychiatric procedure include:  None.    [facility]:  Functional Assessment and Individualized Treatment Plan        Date of Assessment and Plan: 2024  Name: Sid Miranda MRN: [id number]  : 1995     DSM5 Diagnoses:    Encounter Diagnoses   Name Primary?    Generalized anxiety disorder Yes    Moderate episode of recurrent major depressive disorder (H)     Post-traumatic stress disorder     Severe stimulant use disorder (H)      PROMIS:     Question 2024  9:39 AM CST - Filed by Patient   Please respond to each question or statement.    In general, would you say your health is: Very good   In general, would you say your quality of life is: Very good   In general, how would you  rate your physical health? Very good   In general, how would you rate your mental health, including your mood and your ability to think? Very good   In general, how would you rate your satisfaction with your social activities and relationships? Good   In general, please rate how well you carry out your usual social activities and roles. (This includes activities at home, at work and in your community, and responsibilities as a parent, child, spouse, employee, friend, etc.) Good   To what extent are you able to carry out your everyday physical activities such as walking, climbing stairs, carrying groceries, or moving a chair? Completely   In the past 7 days    How often have you been bothered by emotional problems such as feeling anxious, depressed or irritable? Sometimes   How would you rate your fatigue on average? Moderate   How would you rate your pain on average? 0   PROMIS Global Mental Health T-Score (range: 20 - 70) 48 (Good)   PROMIS Global Physical Health T-Score (range: 15 - 70) 54 (Very Good)   PROMIS General Health Score (range: 1 - 5) 4   PROMIS Global Social Activities & Roles Score (range: 1 - 5) 3     Insert any other relevant measures or screening tools:     PHQ-9 score:        12/16/2024     9:34 AM   PHQ   PHQ-9 Total Score 9    Q9: Thoughts of better off dead/self-harm past 2 weeks Several days    F/U: Thoughts of suicide or self-harm No    F/U: Safety concerns No        Patient-reported           3/21/2023     8:44 AM 12/16/2024     9:39 AM   CAGE-AID Total Score   Total Score 4 4    Total Score MyChart 4 (A total score of 2 or greater is considered clinically significant) 4 (A total score of 2 or greater is considered clinically significant)       Patient-reported       CAGE-AID score  > 1 is a positive screen, suggesting further discussion is needed to determine if evaluation for alcohol or substance abuse is appropriate.  A score > 2 is considered clinically significant, suggesting further  evaluation of alcohol or substance-related problems is indicated.        2023     8:55 AM 2024     9:36 AM   LORENZO-7 SCORE   Total Score 6 (mild anxiety) 5 (mild anxiety)   Total Score 6    6 5        Patient-reported    Multiple values from one day are sorted in reverse-chronological order       Functional Assessment  Assess and document how the client's symptoms (associated with above diagnoses) impact the client's functioning in the following areas. Assign 0 - 3 for each of the 10 areas of impact listed below. For anything with a 1 or higher, provide one or two sentences to explain.    0: No Impact; 1: Minimal Impact; 2: Moderate Impact; 3: Severe Impact     (i) the client's co-occurring mental health symptoms: 1 - Substance use disorder needs a strict management that can be overbearing and occasional dissatisfaction with life. Anxiety about managing sobriety/recovery long-term. Use is a big disruption on mental health. PTSD associated with substance use and resurfacing of ideas/experiences related to use. Sexual dysfunction as a consequence of abstaining, creating complications in relationship and challenges sense of self-worth. Depression is a barrier to meeting responsibilities, but is getting better.    (ii) the client's mental health service needs: 2 - Individual therapy with this provider, 12-step programming, psychiatric medication management with Neftali West PA-C, at this clinic.    (iii) the client's substance use: 1 - currently more minimal relative to history. Still has impact on decision-making, coping, etc.    (iv) the client's vocational and educational functionin - Sleep, fatigue, reduced energy and mood challenges affecting productivity.    (v) the client's social functioning, including the use of leisure time: 1 - Sleep, fatigue, reduced energy and mood challenges affecting use of leisure time. Anxiety preventing him from resting effectively.    (vi) the client's interpersonal  functioning, including relationships with the client's family and other natural supports: 1 - Overall relationships are positive, but symptoms create significant challenges in romantic relationships due to sexual concerns.    (vii) the client's ability to provide self-care and live independently: 1 - Occasional mood challenges affecting responsibilities.    (viii) the client's medical and dental health: 1 - Client reported long-term anxiety about dental health contributing to avoidance.     (ix) the client's financial assistance needs: 0    (x) the client's housing and transportation needs: 0    Client Strengths:  - Available and diverse support network  - Discipline and commitment to positive care routines  - Willingness to try new interventions and engage in care    Areas of Vulnerability:  - Low sense of self-concept and self-worth  - Sleep quality and nightmares, some urges related to substance use    Narrative summary of the client's strengths, resources, and all areas of functional impairment: In the past 6 months, client has made significant progress in expanding abstinence from stimulants and reducing use episodes (frequency and duration). He has also improved in trauma-related activation, severity of depressive symptoms, and anxiety. He currently reports anxiety about sexual functioning and his relationship. He also describes a poor sense of self-worth, which is partly attributed to sexual functioning. Client has engaged many sources of support and is active in his mental health and recovery supports. He describes challenges with sleep due to nightmares, infrequent cravings or urges for stimulants, and anxiety about engaging in sex in ways that do not activate meth-related cravings.     Treatment Plan   Treatment plan will be reviewed in 180 days or when goals have been changed.    Anticipated number of session for this episode of care: 24  Anticipation frequency of session: Weekly    Client Participation  in Plan:  Contributed to goals and plan   Agrees with plan       For this six month period of time, what are your goals for treatment?  I'm interested in reintroducing sexuality into my life, a continuation of the trauma work we have done, overcoming a fear of sex (related to trauma and history of substance use patterns)     GOAL 1: Continue addressing sexual anxiety and avoidance of sex in relationships  Measureable treatment objectives:  -Patient's self-report of sexual satisfaction.    GOAL 2: Continue discussing, identifying, and intervening on substance use patterns and overall mental health.  Measureable treatment objectives:  - Sustained abstinence from meth use  - Engagement in 12-step and other recovery supports  - Self-reported improvement in symptoms and functioning  - Improvement in LORENZO-7 and PHQ-9 Scores    Treatment Strategy (Theoretical Framework): Cognitive Behavioral Therapy    Modality (Check all that apply): Individual therapy  Proposed Interventions: Exposure-based interventions to challenge anxiety, avoidance, and trauma. Maintaining support network. Behavioral activation and skills practice.    Other [facility] providers involved in care include: Neftali West PA-C  Referral / Collaboration: Referral to another professional/service is not indicated at this time..    Note: Measureable means subjective or objective assessment of progress, impact on functioning, symptoms severity, and/or behavior related to treatment goals.      Francisco Cannon, PhD, LP    [place] [facility], [facility]  Department of Family Medicine and Community Health  [facility] [employer]

## 2025-01-13 ENCOUNTER — VIRTUAL VISIT (OUTPATIENT)
Dept: PSYCHOLOGY | Facility: CLINIC | Age: 30
End: 2025-01-13
Payer: COMMERCIAL

## 2025-01-13 DIAGNOSIS — F41.1 GENERALIZED ANXIETY DISORDER: ICD-10-CM

## 2025-01-13 DIAGNOSIS — F43.10 POST-TRAUMATIC STRESS DISORDER: ICD-10-CM

## 2025-01-13 DIAGNOSIS — F15.20 SEVERE STIMULANT USE DISORDER (H): ICD-10-CM

## 2025-01-13 DIAGNOSIS — F33.1 MODERATE EPISODE OF RECURRENT MAJOR DEPRESSIVE DISORDER (H): Primary | ICD-10-CM

## 2025-01-13 NOTE — PROGRESS NOTES
[place] [facility] - Progress Note    Date of Service: 25   Name: Sid Miranda  : 1995  Medical Record Number: [id number]  Treating Provider:Francisco Cannon, Ph.D,    Type of Session: Individual  Present in Session: Client and therapist  Session Start and Stop Time: 10:02am-10:58am  Number of Minutes:  56    SERVICE MODALITY:  Video Visit:      Provider verified identity through the following two step process.  Patient provided:  Patient is known previously to provider and Patient was verified at admission/transfer    Telemedicine Visit: The patient's condition can be safely assessed and treated via synchronous audio and visual telemedicine encounter.      Reason for Telemedicine Visit: Patient has requested telehealth visit    Originating Site (Patient Location): Patient's home    Distant Site (Provider Location): [facility]    Consent:  The patient/guardian has verbally consented to: the potential risks and benefits of telemedicine (video visit) versus in person care; bill my insurance or make self-payment for services provided; and responsibility for payment of non-covered services.     Patient would like the video invitation sent by:  My Chart    Mode of Communication:  Video Conference via [employer]    Distant Location (Provider):  On-site    As the provider I attest to compliance with applicable laws and regulations related to telemedicine.      DSM-5 Diagnoses:  Encounter Diagnoses   Name Primary?    Moderate episode of recurrent major depressive disorder (H) Yes    Generalized anxiety disorder     Post-traumatic stress disorder     Severe stimulant use disorder (H)          Current Reported Symptoms and Status update:  Changes since last session includes increase in depressive symptoms (difficulty staying asleep and difficulty waking in the morning), lower mood, mild anhedonia, attributing this to seasonal change; no change in  anxiety; improvement in trauma symptoms and reduced nightmares; abstinence from meth for 3 months.    Post Traumatic Stress Disorder: Client reported a history of traumatic events meeting criteria for diagnosis. Client experiences recurrent memories, troubling dreams, physiological reactivity in response to memories/reminders of traumatic events, and trauma-related avoidance.    Generalized Anxiety Disorder: Excessive anxiety and worry about a number of events or activities (such as work or school performance); difficulty controlling worry; restlessness or feeling keyed up or on edge; difficulty concentrating or mind going blank; irritability.      Depression: Client reports hopelessness, depressed mood, irritability, anhedonia, low self-worth, isolation, fatigue, poor sleep, decreased functioning.      Severe Stimulant Use Disorder: Client has a history of methamphetamine use that significantly negatively impacts functioning, including financial stress, loss of employment, and loss of relationships. He continued to use despite negative consequences. Client has a history of sexual behavior he describes as compulsive when using meth. Further assessment needed to determine if compulsive sexual behaviors also occur separately from meth use.     Progress Toward Treatment Goals:   Satisfactory progress     Therapeutic Interventions/Treatment Strategies:    Area(s) of treatment focus addressed in this session included Symptom Management, Interpersonal Relationship Skills, and Sexual Health and Wellness    Client shared that he has maintained >3 months abstinence from meth, and we reviewed factors contributing to him working on his goal of sobriety. He also discussed some increase in depression, which he attributed to the season. He also processed decreased sexual desire. We assessed if changes in spontaneous versus responsive desire, his relationship, mood, or medication may be contributing factors. We further discussed  how biopsychosocial factors may be affecting sexual arousal - particularly his disgust response to sex. He also processed how past sexual experiences may have shaped his sexual arousal and how being sober may affect this. Client also noted that his priority right now is to continue stability while also exploring positive sexual health. Encouraged client to reflect on positive changes he has made.    Psychotherapist offered support, feedback and validation and reinforced use of skills Treatment modalities used include Cognitive Behavioral Therapy  Support, Feedback, and Problem Solving    Patient Response:   Patient responded to session by accepting feedback, giving feedback, listening, focusing on goals, and accepting support  Possible barriers to participation / learning include: N/A    Current Mental Status Exam:   Appearance:  Appropriate   Eye Contact:  Good   Attitude / Demeanor: Friendly  Speech      Rate / Production: Normal/ Responsive      Volume:  Normal  volume  Orientation:  All  Mood:   Normal  Affect:   Appropriate   Thought Content: Clear   Insight:   Good       Plan/Need for Future Services:  Return for therapy in 1-2 weeks to treat diagnosed problems.    Patient has a current master individualized treatment plan.  See Epic treatment plan for more information.    Referral / Collaboration:  Referral to another professional/service is not indicated at this time..  Emergency Services Needed?  No    Assignment:  Discuss medication with Neftali West PA-C, at this clinic  Write about changes he has made    Interactive Complexity:  There are four specific communication difficulties that complicate the work of the primary psychiatric procedure.  Interactive complexity (+94372) may be reported when at least one of these difficulties is present.    Communication difficulties present during current the psychiatric procedure include:  None.      Signature/Title:    Francisco Cannon, PhD, LP  Assistant  Professor  [place] [facility], [facility]  Department of Family Medicine and Community Health  [facility]

## 2025-01-13 NOTE — NURSING NOTE
Is the patient currently in the state of [place]? YES    Current patient location: [address] [address]    Visit mode:Video    If the visit is dropped, the patient can be reconnected by: VIDEO VISIT:  Send e-mail to at [email or web address]    Will anyone else be joining the visit? No  (If patient encounters technical issues they should call [phone])    Are changes needed to the allergy or medication list? N/A    Are refills needed on medications prescribed by this physician? No    Rooming Documentation: Questionnaire(s) not done per department protocol.    Reason for visit: RECHBIANKA Sotelo

## 2025-01-27 ENCOUNTER — VIRTUAL VISIT (OUTPATIENT)
Dept: PSYCHOLOGY | Facility: CLINIC | Age: 30
End: 2025-01-27
Payer: COMMERCIAL

## 2025-01-27 DIAGNOSIS — F33.1 MODERATE EPISODE OF RECURRENT MAJOR DEPRESSIVE DISORDER (H): ICD-10-CM

## 2025-01-27 DIAGNOSIS — F43.10 POST-TRAUMATIC STRESS DISORDER: ICD-10-CM

## 2025-01-27 DIAGNOSIS — F15.20 SEVERE STIMULANT USE DISORDER (H): ICD-10-CM

## 2025-01-27 DIAGNOSIS — F41.1 GENERALIZED ANXIETY DISORDER: Primary | ICD-10-CM

## 2025-01-27 NOTE — PROGRESS NOTES
[place] [facility] - Progress Note    Date of Service: 25   Name: Sid Miranda  : 1995  Medical Record Number: [id number]  Treating Provider: Francisco Cannon, PhD  Type of Session: Individual  Present in Session: Client and therapist  Session Start and Stop Time: 3:02pm-3:55pm  Number of Minutes:  53    SERVICE MODALITY:  Video Visit:      Provider verified identity through the following two step process.  Patient provided:  Patient is known previously to provider and Patient was verified at admission/transfer    Telemedicine Visit: The patient's condition can be safely assessed and treated via synchronous audio and visual telemedicine encounter.      Reason for Telemedicine Visit: Patient has requested telehealth visit    Originating Site (Patient Location): Patient's home    Distant Site (Provider Location): [facility]    Consent:  The patient/guardian has verbally consented to: the potential risks and benefits of telemedicine (video visit) versus in person care; bill my insurance or make self-payment for services provided; and responsibility for payment of non-covered services.     Patient would like the video invitation sent by:  My Chart    Mode of Communication:  Video Conference via [employer]    Distant Location (Provider):  On-site    As the provider I attest to compliance with applicable laws and regulations related to telemedicine.      DSM-5 Diagnoses:  Encounter Diagnoses   Name Primary?    Generalized anxiety disorder Yes    Moderate episode of recurrent major depressive disorder (H)     Post-traumatic stress disorder     Severe stimulant use disorder (H)          Current Reported Symptoms and Status update:  Changes since last session includes some anxiety and worry, relationship conflict contributing to stress, depression improved with treatment, reduced PTSD symptoms, maintained abstinence from meth.    Post Traumatic Stress  Disorder: Client reported a history of traumatic events meeting criteria for diagnosis. Client experiences recurrent memories, troubling dreams, physiological reactivity in response to memories/reminders of traumatic events, and trauma-related avoidance.    Generalized Anxiety Disorder: Excessive anxiety and worry about a number of events or activities (such as work or school performance); difficulty controlling worry; restlessness or feeling keyed up or on edge; difficulty concentrating or mind going blank; irritability.      Depression: Client reports hopelessness, depressed mood, irritability, anhedonia, low self-worth, isolation, fatigue, poor sleep, decreased functioning.      Severe Stimulant Use Disorder: Client has a history of methamphetamine use that significantly negatively impacts functioning, including financial stress, loss of employment, and loss of relationships. He continued to use despite negative consequences. Client has a history of sexual behavior he describes as compulsive when using meth. Further assessment needed to determine if compulsive sexual behaviors also occur separately from meth use.     Progress Toward Treatment Goals:   Satisfactory progress     Therapeutic Interventions/Treatment Strategies:    Area(s) of treatment focus addressed in this session included Symptom Management and Interpersonal Relationship Skills    Client shared recent conflict and stressors in his relationship that interact with his anxiety. He also identified and processed concerns related to sexuality, including aspects that are aversive for him. Therapist supported client to explore aspects of sexuality he is comfortable with. We also explored factors contributing to a disgust response about sex. We discussed potential for client practicing mindful masturbation. Client processed emotions and physical sensations related to anxiety.    Psychotherapist offered support, feedback and validation and reinforced use of  skills Treatment modalities used include Cognitive Behavioral Therapy  Support and Feedback    Patient Response:   Patient responded to session by accepting feedback, giving feedback, listening, focusing on goals, and accepting support  Possible barriers to participation / learning include: N/A    Current Mental Status Exam:   Appearance:  Appropriate   Eye Contact:  Good   Attitude / Demeanor: Friendly  Speech      Rate / Production: Normal/ Responsive      Volume:  Normal  volume  Orientation:  All  Mood:   Normal  Affect:   Appropriate   Thought Content: Clear   Insight:   Good       Plan/Need for Future Services:  Return for therapy in 1-2 weeks to treat diagnosed problems.    Patient has a current master individualized treatment plan.  See Epic treatment plan for more information.    Referral / Collaboration:  Referral to another professional/service is not indicated at this time..  Emergency Services Needed?  No    Assignment:  Continue self-care.    Interactive Complexity:  There are four specific communication difficulties that complicate the work of the primary psychiatric procedure.  Interactive complexity (+[id number]) may be reported when at least one of these difficulties is present.    Communication difficulties present during current the psychiatric procedure include:  None.      Signature/Title:    Francisco Cannon, PhD, LP    [place] [facility], [facility]  Department of Family Medicine and Community Health  [employer]

## 2025-01-27 NOTE — NURSING NOTE
Current patient location: [address] [address]    Is the patient currently in the state of [place]? YES    Visit mode: VIDEO    If the visit is dropped, the patient can be reconnected by:VIDEO VISIT: Send to e-mail at: [email or web address]    Will anyone else be joining the visit? NO  (If patient encounters technical issues they should call [phone]    Are changes needed to the allergy or medication list? N/A    Are refills needed on medications prescribed by this physician? NO    Rooming Documentation:  Not applicable    Reason for visit: RECHECK    Emilie CARLTON

## 2025-01-29 ENCOUNTER — VIRTUAL VISIT (OUTPATIENT)
Dept: PSYCHIATRY | Facility: CLINIC | Age: 30
End: 2025-01-29
Payer: COMMERCIAL

## 2025-01-29 VITALS — BODY MASS INDEX: 23.75 KG/M2 | WEIGHT: 180 LBS

## 2025-01-29 DIAGNOSIS — F32.A DEPRESSION, UNSPECIFIED DEPRESSION TYPE: Primary | ICD-10-CM

## 2025-01-29 RX ORDER — BUPROPION HYDROCHLORIDE 150 MG/1
150 TABLET ORAL EVERY MORNING
Qty: 30 TABLET | Refills: 0 | Status: SHIPPED | OUTPATIENT
Start: 2025-01-29 | End: 2025-02-28

## 2025-01-29 NOTE — PROGRESS NOTES
Virtual Visit Details    Type of service:  Video Visit   Video Start Time:  9A  Video End Time: 9:15A    Originating Location (pt. Location): Home    Distant Location (provider location):  Off-site  Platform used for Video Visit: [facility] TEAM:    [facility]  Therapist- Francisco Cannon    Sid is a 29 year old who uses the pronouns he, him, his, himself.      Diagnoses        Post-traumatic stress disorder  Generalized anxiety disorder  Depression, unspecified depression type  Severe stimulant use disorder (H)     Assessment     Sid was seen today for a follow-up. Conditions well controlled but adding wellbutrin XL 150mg to treat sexual dysfunction attributed to lexapro. He has been on this before and it did induce anxiety and also used this to help him with his substance use. I do feel this would be beneficial for both mental health and history of substance use.     Future Considerations: none at this time.     Psychotropic Drug Interactions:   Lexapro and wellbutrin: increases wellbutrin.   Management: use lowest therapeutic doses of wellbutrin    [place] was [place] today: not using controlled substances    Risk Statements:   Treatment Risk- Risks, benefits, alternatives and potential adverse effects have been discussed and are understood.   Safety Risk-Sid Lau did not appear to be an imminent safety risk to self or others.     Plan     1) Medications:   - CONTINUE prazosin 1mg at bedtime (take this if nightmares return).   - Continue lexapro 15mg.   - Continue naltrexone 50mg  - START: wellbutrin XL 150mg.     2) Psychotherapy: continue    3) Next due:  Labs- Routine monitoring is not indicated for current psychotropic medication regimen   EKG- Routine monitoring is not indicated for current psychotropic medication regimen   Rating scales- none needed    4) Referrals: none    5) Other: none    6) Follow-up: Return to clinic in 4 weeks.        Pertinent Background                                                    [most recent leyda 12/11/24]   Established care on 5/22/24  Reports past diagnosis of: substance abuse disorder, generalized anxiety,      Seeing Francisco a bit over a year now. Narrowing goals for treatment. Barriers to improvement  Would like to address baseline depression and meth use.   First sought treatment: in late childhood. Saw therapy at age 8 and 11-also had psych meds and stopped psychotherapist around 14 and continued medsf ora few years. Therapisy in mid 20s.   Since then recovery at 24. Substance abuse treatment centers. Before francisco in DBT.   Couple of hospitalizations:   First at 11: 3 day thing.   Inpatient 12 (3 day eval after pretending suidicde attempt) and one at 13 (SIB).   Has community support. Closest friends in [facility] in active use.   Boyfriend in recovery   Had a sponsor and was fired.      Sid Miranda is a 28 year old White Not  or  male presenting for psychiatric evaluation and medication management. Information is obtained from patient and available records.  Reports history of    Generalized anxiety disorder  Depression, unspecified depression type  Post-traumatic stress disorder  Severe stimulant use disorder (H)   Previously psychiatrically hospitalized during adolescence various times and chemical dependency treatment. History of IOP as well.  Hx of suicidal ideation and attempts.   Hx of self-injurious behaviors in the form of cutting starting at age middle school.  Genetically loaded for  substance use, schizophrenia. Grew up in an intact home with all basic needs being met.     Sid was seen today for consult.     His stimulant use anxiety and depression are not controlled it is reassuring he is in therapy is currently working with therapist for harm reduction with stimulant use disorder.  I am prescribing Lexapro 5 mg to address anxiety and depression to better overall mood.  Follow-up in 1 month.      Today: 6/27/24:      Started  lexapro briefly, get in next week with primary, imitrex prescribed for migraines no more episodes, end of that week. Use episide for one day. Then over. Restarted lexapro for 3 weeks. Spoke with brenda. Increase depression. Couple other variables: Money issues and other variables impacting depression. Interested in naltrexone for substance use.  Sid was seen today for recheck.  Prescribed naltrexone for stimulant use. Advised him he may experience nausea and gave him zofran as needed. Increased lexapro to 10mg to address depression and anxiety. Spent time talking about safety due to passive thoughts of suicidal ideation and talked about resources if he needed them. Follow up in a month.      7/11/24: new dose of lexapro is fine. Naltrexone notes no side effects.   Reports suicidal thoughts. Most days have been better. Today, lower mood kind of tired. Contracts for safety.     Sid was seen today for recheck.  Provied refills of medications. Offered to increase lexapro to 15mg due to continued depressive symptoms. Anxiety not controlled. Follow up in a month.     8/22/24:   Notes things are going pretty well. New job at [email or web address]. Has a pull toward nonprofit work. Notes experiencing scattered nightmares a couple times a week he remembers it.  Wakes up at 1am and walks around and wonders if it's happening more than once a week. Sleep has been segmented. Naltrexone going well no nausea. Downtick in cravings.   Sid was seen today for recheck.  No adjistments needed to lexapro. Tolerating naltrexone well. Introduced the idea of prazosin for nightmares. He is willing to do some research on this topic. Conditions better controlled.        11/6/24: Doing well. No concerns at this time.      Sid was seen today for recheck.  Conditions better controlled. No refills at this time. Continuing lexapro and naltrexone.     12/11/24:   - decent, not quite as shiny. Get a couple months without regular consistent sleep.  Taking medications and notes vivid dreams that wake him up.   Sid was seen today for a follow-up.  His PTSD is not controlled as he is 5 been experiencing nightmares that cause him to wake up in the middle the night we did discuss adding prazosin to help with this.    Subjective     Since the last visit:   -Doing well has not started the prazosin as nightmares resolved after picking up notes some sleep disruption but notes it is most likely due to sleep hygiene notes ongoing experience of lack of sexual arousal    Current Social History:  Financial/occupational: employed  Living situation (partner, children, pets, etc): not assessed  Social/spiritual support: yes  Feels safe at home: not assessed    Medical Review of Systems:   Lightheadedness/orthostasis: None  Headaches: None  GI: none  Sexual health concerns: Lack of sexual arousal       Mental Status Exam     General/Constitutional:  Appearance:  awake, alert, adequately groomed, appeared stated age and no apparent distress  Attitude:   cooperative   Eye Contact:  good  Musculoskeletal:  Psychomotor Behavior:  no evidence of tardive dyskinesia, dystonia, or tics from the head up  Psychiatric:  Speech:  clear, coherent, regular rate, rhythm, and volume,  No pressure speech noted.  Associations:  no loose associations  Thought Process:  logical, linear and goal oriented  Thought Content:   No evidence of suicidal ideation or homicidal ideation, no evidence of psychotic thought, no auditory hallucinations present and no visual hallucinations present  Mood:  good  Affect:  full range/stable (normal variation of emotions during exam) and was congruent to speech content.  Insight:  good  Judgment:  intact, adequate for safety  Impulse Control:  intact  Neurological:  Oriented to:  person, place, time, and situation  Attention Span and Concentration:  Able to attend to the interview     Language: intact    Recent and Remote Memory:  Intact to interview. Not formally  assessed. No amnesia.   Fund of Knowledge: appropriate         Past Psych Med Trials      Medication Max Dose (mg) Dates / Duration Helpful? DC Reason / Adverse Effects?   wellbutrin   denies Induced anxiety, lessen withdrawal experience.    zoloft   denies    lithium   denies    risperdal   denies    abilify   denies    tegredol   denies                            Treatment Course and Terrell Events since  JULY 2023   Established care on 5/22/24: His stimulant use anxiety and depression are not controlled it is reassuring he is in therapy is currently working with therapist for harm reduction with stimulant use disorder.  I am prescribing Lexapro 5 mg to address anxiety and depression to better overall mood.  Follow-up in 1 month.       6/27/24:Prescribed naltrexone for stimulant use. Advised him he may experience nausea and gave him zofran as needed. Increased lexapro to 10mg to address depression and anxiety. Spent time talking about safety due to passive thoughts of suicidal ideation and talked about resources if he needed them.      Today: 7/11/24: new dose of lexapro is fine. Naltrexone notes no side effects.   Reports suicidal thoughts. Most days have been better. Today, lower mood kind of tired. Contracts for safety.vProvied refills of medications. Offered to increase lexapro to 15mg due to continued depressive symptoms. Anxiety not controlled.    Today: 8/22/24: Notes things are going pretty well. New job at [employer]. Has a pull toward nonprofit work. Notes experiencing scattered nightmares a couple times a week he remembers it.  Wakes up at 1am and walks around and wonders if it's happening more than once a week. Sleep has been segmented. Naltrexone going well no nausea. Downtick in cravings. No adjustments needed to lexapro. Tolerating naltrexone well. Introduced the idea of prazosin for nightmares. He is willing to do some research on this topic. Conditions better controlled.     1/19/25: Started  WellbutrinXL 150mg to offset effects of Lexapro on sexual arousal.    Vitals   Wt 81.6 kg (180 lb)   BMI 23.75 kg/m    Pulse Readings from Last 3 Encounters:   05/22/24 68     Wt Readings from Last 3 Encounters:   01/29/25 81.6 kg (180 lb)   11/06/24 86.2 kg (190 lb)   07/11/24 81.6 kg (180 lb)     BP Readings from Last 3 Encounters:   05/22/24 119/58        Medical History     ALLERGIES: Patient has no known allergies.    There is no problem list on file for this patient.       Medications     Current Outpatient Medications   Medication Sig Dispense Refill    escitalopram (LEXAPRO) 10 MG tablet Take 1 tablet (10 mg) by mouth daily. 30 tablet 0    escitalopram (LEXAPRO) 5 MG tablet Take 1 tablet (5 mg) by mouth daily. 30 tablet 0    naltrexone (DEPADE/REVIA) 50 MG tablet Take 1 tablet (50 mg) by mouth daily. 30 tablet 0    ondansetron (ZOFRAN ODT) 4 MG ODT tab Take 1 tablet (4 mg) by mouth every 8 hours as needed for nausea 30 tablet 0    prazosin (MINIPRESS) 1 MG capsule Take 1 capsule (1 mg) by mouth at bedtime. 30 capsule 0        Labs and Data         3/21/2023     8:44 AM 5/23/2024    11:25 AM 1/29/2025     8:59 AM   PROMIS-10 Total Score w/o Sub Scores   PROMIS TOTAL - SUBSCORES 27 24    24 30        Patient-reported         3/21/2023     8:44 AM 12/16/2024     9:39 AM   CAGE-AID Total Score   Total Score 4 4    Total Score MyChart 4 (A total score of 2 or greater is considered clinically significant) 4 (A total score of 2 or greater is considered clinically significant)       Patient-reported         12/16/2024     9:34 AM   PHQ-9 SCORE   PHQ-9 Total Score MyChart 9 (Mild depression)   PHQ-9 Total Score 9        Patient-reported         4/4/2023     8:55 AM 12/16/2024     9:36 AM   LORENZO-7 SCORE   Total Score 6 (mild anxiety) 5 (mild anxiety)   Total Score 6    6 5        Patient-reported       Liver/Kidney Function, TSH Metabolic Blood counts   No lab results found.  No lab results found. No lab results  found.  No lab results found.  No lab results found. No lab results found.      .g  Administrative/Billing:  The longitudinal plan of care for the diagnosis(es)/condition(s) as documented were addressed during this visit. Due to the added complexity in care, I will continue to support Sid in the subsequent management and with ongoing continuity of care.   PROVIDER: Neville West PA-C

## 2025-01-29 NOTE — NURSING NOTE
Current patient location: [address] [address]    Is the patient currently in the state of [place]? YES    Visit mode: VIDEO    If the visit is dropped, the patient can be reconnected by:VIDEO VISIT: Text to cell phone:   Telephone Information:   Mobile [phone]       Will anyone else be joining the visit? NO  (If patient encounters technical issues they should call [phone]    Are changes needed to the allergy or medication list? No    Are refills needed on medications prescribed by this physician? NO    Rooming Documentation:  Patient will complete questionnaire(s) in [employer]    Reason for visit: RECHECK    Emilie KOOF

## 2025-02-03 ENCOUNTER — VIRTUAL VISIT (OUTPATIENT)
Dept: PSYCHOLOGY | Facility: CLINIC | Age: 30
End: 2025-02-03
Payer: COMMERCIAL

## 2025-02-03 DIAGNOSIS — F15.20 SEVERE STIMULANT USE DISORDER (H): ICD-10-CM

## 2025-02-03 DIAGNOSIS — F33.1 MODERATE EPISODE OF RECURRENT MAJOR DEPRESSIVE DISORDER (H): Primary | ICD-10-CM

## 2025-02-03 DIAGNOSIS — F41.1 GENERALIZED ANXIETY DISORDER: ICD-10-CM

## 2025-02-03 DIAGNOSIS — F43.10 POST-TRAUMATIC STRESS DISORDER: ICD-10-CM

## 2025-02-03 NOTE — PROGRESS NOTES
[place] [facility] - Progress Note    Date of Service: 25   Name: Sid Miranda  : 1995  Medical Record Number: [id number]  Treating Provider: Francisco Cannon, PhD  Type of Session: Individual  Present in Session: Client and therapist  Session Start and Stop Time: 10:04am-10:59am  Number of Minutes:  55    SERVICE MODALITY:  Video Visit:      Provider verified identity through the following two step process.  Patient provided:  Patient is known previously to provider and Patient was verified at admission/transfer    Telemedicine Visit: The patient's condition can be safely assessed and treated via synchronous audio and visual telemedicine encounter.      Reason for Telemedicine Visit: Patient has requested telehealth visit    Originating Site (Patient Location): Patient's home    Distant Site (Provider Location): [facility]    Consent:  The patient/guardian has verbally consented to: the potential risks and benefits of telemedicine (video visit) versus in person care; bill my insurance or make self-payment for services provided; and responsibility for payment of non-covered services.     Patient would like the video invitation sent by:  My Chart    Mode of Communication:  Video Conference via [employer]    Distant Location (Provider):  On-site    As the provider I attest to compliance with applicable laws and regulations related to telemedicine.      DSM-5 Diagnoses:  Encounter Diagnoses   Name Primary?    Moderate episode of recurrent major depressive disorder (H) Yes    Generalized anxiety disorder     Post-traumatic stress disorder     Severe stimulant use disorder (H)          Current Reported Symptoms and Status update:  Changes since last session includes recent increase in depressive symptoms and anhedonia, difficulties with sleep the past couple of days, some anxiety and restlessness, maintained abstinence from meth.    Post Traumatic  Stress Disorder: Client reported a history of traumatic events meeting criteria for diagnosis. Client experiences recurrent memories, troubling dreams, physiological reactivity in response to memories/reminders of traumatic events, and trauma-related avoidance.    Generalized Anxiety Disorder: Excessive anxiety and worry about a number of events or activities (such as work or school performance); difficulty controlling worry; restlessness or feeling keyed up or on edge; difficulty concentrating or mind going blank; irritability.      Depression: Client reports hopelessness, depressed mood, irritability, anhedonia, low self-worth, isolation, fatigue, poor sleep, decreased functioning.      Severe Stimulant Use Disorder: Client has a history of methamphetamine use that significantly negatively impacts functioning, including financial stress, loss of employment, and loss of relationships. He continued to use despite negative consequences. Client has a history of sexual behavior he describes as compulsive when using meth. Further assessment needed to determine if compulsive sexual behaviors also occur separately from meth use.     Progress Toward Treatment Goals:   Satisfactory progress     Therapeutic Interventions/Treatment Strategies:    Area(s) of treatment focus addressed in this session included Symptom Management, Interpersonal Relationship Skills, and Sexual Health and Wellness    Client shared difficult feelings related to feeling stuck, restless, having euphoric recall of use, and sexual aversion. He processed conflict with his partner and ways in which he suppresses emotion expression in the relationship. We explored how this contributes to his difficult feelings. We also discussed ways in which he could share his feelings more and engage in effective conflict. Client linked this to DBT treatment modules. Client expressed wishing we could work more on meth sobriety and sexual aversion; therapist linked  today's discussion as contributing factors to these concerns. Client noted that was helpful to think about .    Psychotherapist offered support, feedback and validation and reinforced use of skills Treatment modalities used include Cognitive Behavioral Therapy  Support and Feedback    Patient Response:   Patient responded to session by accepting feedback, giving feedback, listening, focusing on goals, and accepting support  Possible barriers to participation / learning include: N/A    Current Mental Status Exam:   Appearance:  Appropriate   Eye Contact:  Good   Attitude / Demeanor: Friendly  Speech      Rate / Production: Normal/ Responsive      Volume:  Normal  volume  Orientation:  All  Mood:   Normal  Affect:   Appropriate   Thought Content: Clear   Insight:   Good     Plan/Need for Future Services:  Return for therapy in 1 week to treat diagnosed problems.    Patient has a current master individualized treatment plan.  See Epic treatment plan for more information.    Referral / Collaboration:  Referral to another professional/service is not indicated at this time..  Emergency Services Needed?  No    Assignment:  Communication with partner    Interactive Complexity:  There are four specific communication difficulties that complicate the work of the primary psychiatric procedure.  Interactive complexity (+65164) may be reported when at least one of these difficulties is present.    Communication difficulties present during current the psychiatric procedure include:  None.      Signature/Title:      Francisco Cannon, PhD, LP    [place] [facility], [facility]  Department of Family Medicine and Community Health  [employer] [facility] [employer]

## 2025-02-03 NOTE — NURSING NOTE
Current patient location: [address] [address]    Is the patient currently in the state of [place]? YES    Visit mode: VIDEO    If the visit is dropped, the patient can be reconnected by:VIDEO VISIT: Text to cell phone:   Telephone Information:   Mobile [phone]       Will anyone else be joining the visit? NO  (If patient encounters technical issues they should call [phone]    Are changes needed to the allergy or medication list? N/A    Are refills needed on medications prescribed by this physician? NO    Rooming Documentation:  Not applicable    Reason for visit: RECHFARA CARLTON

## 2025-02-10 ENCOUNTER — VIRTUAL VISIT (OUTPATIENT)
Dept: PSYCHOLOGY | Facility: CLINIC | Age: 30
End: 2025-02-10
Payer: COMMERCIAL

## 2025-02-10 DIAGNOSIS — F41.1 GENERALIZED ANXIETY DISORDER: ICD-10-CM

## 2025-02-10 DIAGNOSIS — F15.20 SEVERE STIMULANT USE DISORDER (H): ICD-10-CM

## 2025-02-10 DIAGNOSIS — F33.1 MODERATE EPISODE OF RECURRENT MAJOR DEPRESSIVE DISORDER (H): Primary | ICD-10-CM

## 2025-02-10 DIAGNOSIS — F43.10 POST-TRAUMATIC STRESS DISORDER: ICD-10-CM

## 2025-02-10 NOTE — NURSING NOTE
Is the patient currently in the state of [place]? YES    Current patient location: [address] [address]    Visit mode:Video    If the visit is dropped, the patient can be reconnected by: VIDEO VISIT: Text to cell phone:   Telephone Information:   Mobile [phone]       Will anyone else be joining the visit? No  (If patient encounters technical issues they should call [phone])    Are changes needed to the allergy or medication list? N/A    Are refills needed on medications prescribed by this physician? No    Rooming Documentation: Questionnaire(s) not done per department protocol.    Reason for visit: RECHECK     Su Sanders, BIANKA

## 2025-02-10 NOTE — PROGRESS NOTES
[place] [facility] - Progress Note    Date of Service: 2/10/25   Name: Sid Miranda  : 1995  Medical Record Number: [id number]  Treating Provider: Francisco Cannon, PhD  Type of Session: Individual  Present in Session: Client and therapist  Session Start and Stop Time: 10:01am-10:56am  Number of Minutes:  55    SERVICE MODALITY:  Video Visit:      Provider verified identity through the following two step process.  Patient provided:  Patient is known previously to provider and Patient was verified at admission/transfer    Telemedicine Visit: The patient's condition can be safely assessed and treated via synchronous audio and visual telemedicine encounter.      Reason for Telemedicine Visit: Patient has requested telehealth visit    Originating Site (Patient Location): Patient's home    Distant Site (Provider Location): [facility]    Consent:  The patient/guardian has verbally consented to: the potential risks and benefits of telemedicine (video visit) versus in person care; bill my insurance or make self-payment for services provided; and responsibility for payment of non-covered services.     Patient would like the video invitation sent by:  My Chart    Mode of Communication:  Video Conference via [employer]    Distant Location (Provider):  On-site    As the provider I attest to compliance with applicable laws and regulations related to telemedicine.      DSM-5 Diagnoses:  Encounter Diagnoses   Name Primary?    Moderate episode of recurrent major depressive disorder (H) Yes    Generalized anxiety disorder     Post-traumatic stress disorder     Severe stimulant use disorder (H)      Current Reported Symptoms and Status update:  Changes since last session includes improvement in depression and anxiety, some anhedonia and decreased sexual desire, feeling self-criticism related to sexuality, recognizing negative thinking patterns contributing to symptoms,  "maintained sobriety from meth.    Post Traumatic Stress Disorder: Client reported a history of traumatic events meeting criteria for diagnosis. Client experiences recurrent memories, troubling dreams, physiological reactivity in response to memories/reminders of traumatic events, and trauma-related avoidance.    Generalized Anxiety Disorder: Excessive anxiety and worry about a number of events or activities (such as work or school performance); difficulty controlling worry; restlessness or feeling keyed up or on edge; difficulty concentrating or mind going blank; irritability.      Depression: Client reports hopelessness, depressed mood, irritability, anhedonia, low self-worth, isolation, fatigue, poor sleep, decreased functioning.      Severe Stimulant Use Disorder: Client has a history of methamphetamine use that significantly negatively impacts functioning, including financial stress, loss of employment, and loss of relationships. He continued to use despite negative consequences. Client has a history of sexual behavior he describes as compulsive when using meth. Further assessment needed to determine if compulsive sexual behaviors also occur separately from meth use.     Progress Toward Treatment Goals:   Satisfactory progress     Therapeutic Interventions/Treatment Strategies:    Area(s) of treatment focus addressed in this session included Symptom Management, Interpersonal Relationship Skills, and Sexual Health and Wellness    Client processed recent urges and plans to increase 12-step support. He reflected on feeling anhedonia, particularly around reduced sexual desire. He shared self-criticism, thoughts about how he should be as a sexual person, and wishing he could be \"normal\" or have sexuality go away. He identified adverse experiences with his sexuality, and ways in which these contribute to current shame or criticism. Client shared more information about a particular set of childhood experiences that " connects to his sense of current sexual self. Future session - plan to discuss feeling weird and body image in childhood as pivot point in sexual development. Therapist provided validation and support.     Psychotherapist offered support, feedback and validation and reinforced use of skills Treatment modalities used include Cognitive Behavioral Therapy  Support and Feedback    Patient Response:   Patient responded to session by accepting feedback, giving feedback, listening, focusing on goals, and accepting support  Possible barriers to participation / learning include: N/A    Current Mental Status Exam:   Appearance:  Appropriate   Eye Contact:  Good   Attitude / Demeanor: Friendly  Speech      Rate / Production: Normal/ Responsive      Volume:  Normal  volume  Orientation:  All  Mood:   Normal  Affect:   Appropriate   Thought Content: Clear   Insight:   Good     Plan/Need for Future Services:  Return for therapy in 1 week treat diagnosed problems.    Patient has a current master individualized treatment plan.  See Epic treatment plan for more information.    Referral / Collaboration:  Referral to another professional/service is not indicated at this time..  Emergency Services Needed?  No    Assignment:  Continue self-care    Interactive Complexity:  There are four specific communication difficulties that complicate the work of the primary psychiatric procedure.  Interactive complexity (+58020) may be reported when at least one of these difficulties is present.    Communication difficulties present during current the psychiatric procedure include:  None.        Francisco Cannon, PhD, LP    [place] [facility], [facility] and [facility]  Department of Family Medicine and Community Health  [facility] [employer]

## 2025-02-17 ENCOUNTER — VIRTUAL VISIT (OUTPATIENT)
Dept: PSYCHOLOGY | Facility: CLINIC | Age: 30
End: 2025-02-17
Payer: COMMERCIAL

## 2025-02-17 DIAGNOSIS — F43.10 POST-TRAUMATIC STRESS DISORDER: ICD-10-CM

## 2025-02-17 DIAGNOSIS — F41.1 GENERALIZED ANXIETY DISORDER: ICD-10-CM

## 2025-02-17 DIAGNOSIS — F33.1 MODERATE EPISODE OF RECURRENT MAJOR DEPRESSIVE DISORDER (H): Primary | ICD-10-CM

## 2025-02-17 DIAGNOSIS — F15.20 SEVERE STIMULANT USE DISORDER (H): ICD-10-CM

## 2025-02-17 NOTE — PROGRESS NOTES
[facility]ual [facility] Health Clinic - Progress Note    Date of Service: 25   Name: Sid Miranda  : 1995  Medical Record Number: [id number]  Treating Provider: Francisco Cannon, PhD  Type of Session: Individual  Present in Session: Client and therapist  Session Start and Stop Time: 10:01am-10:53am  Number of Minutes:  52    SERVICE MODALITY:  Video Visit:      Provider verified identity through the following two step process.  Patient provided:  Patient is known previously to provider and Patient was verified at admission/transfer    Telemedicine Visit: The patient's condition can be safely assessed and treated via synchronous audio and visual telemedicine encounter.      Reason for Telemedicine Visit: Patient has requested telehealth visit    Originating Site (Patient Location): Patient's home    Distant Site (Provider Location): [facility]    Consent:  The patient/guardian has verbally consented to: the potential risks and benefits of telemedicine (video visit) versus in person care; bill my insurance or make self-payment for services provided; and responsibility for payment of non-covered services.     Patient would like the video invitation sent by:  My Chart    Mode of Communication:  [facility]    Distant Location (Provider):  On-site    As the provider I attest to compliance with applicable laws and regulations related to telemedicine.      DSM-5 Diagnoses:  Encounter Diagnoses   Name Primary?    Moderate episode of recurrent major depressive disorder (H) Yes    Generalized anxiety disorder     Post-traumatic stress disorder     Severe stimulant use disorder (H) in early remission      Current Reported Symptoms and Status update:  Changes since last session includes:    Depression: Some anhedonia and depressed mood  Anxiety: Improved with treatment  PTSD: Improved with treatment, some low self-concept  Stimulant Use Disorder: Maintained  sobriety for 5 months    Post Traumatic Stress Disorder: Client reported a history of traumatic events meeting criteria for diagnosis. Client experiences recurrent memories, troubling dreams, physiological reactivity in response to memories/reminders of traumatic events, and trauma-related avoidance.    Generalized Anxiety Disorder: Excessive anxiety and worry about a number of events or activities (such as work or school performance); difficulty controlling worry; restlessness or feeling keyed up or on edge; difficulty concentrating or mind going blank; irritability.      Depression: Client reports hopelessness, depressed mood, irritability, anhedonia, low self-worth, isolation, fatigue, poor sleep, decreased functioning.      Severe Stimulant Use Disorder: Client has a history of methamphetamine use that significantly negatively impacts functioning, including financial stress, loss of employment, and loss of relationships. He continued to use despite negative consequences. Client has a history of sexual behavior he describes as compulsive when using meth. Further assessment needed to determine if compulsive sexual behaviors also occur separately from meth use.     Progress Toward Treatment Goals:   Satisfactory progress     Therapeutic Interventions/Treatment Strategies:    Area(s) of treatment focus addressed in this session included Symptom Management, Interpersonal Relationship Skills, and Sexual Health and Wellness    Client shared that his meth-related urges/cravings have gone down. He reported improvement in his connection with his partner, who is also starting mental health treatment. He shared some concerns about potential conflict or irritability in the relationship as his partner navigates sobriety.  Client shared photos of his child self in continuation from last session. While sharing photos, he explored identity development, family dynamics, and shifts in his sense of self as he developed. Client  reflected on early experiences that have continued to impact him as an adult. He also identified and shared feelings that were coming up for him today as he looked at photos and shared experiences. Client described feeling hazy or cloudy while approaching emotional topics.     Psychotherapist offered support, feedback and validation and reinforced use of skills Treatment modalities used include Cognitive Behavioral Therapy  Support and Feedback    Patient Response:   Patient responded to session by accepting feedback, giving feedback, listening, focusing on goals, and accepting support  Possible barriers to participation / learning include: N/A    Current Mental Status Exam:   Appearance:  Appropriate   Eye Contact:  Good   Attitude / Demeanor: Friendly  Speech      Rate / Production: Normal/ Responsive      Volume:  Normal  volume  Orientation:  All  Mood:   Normal  Affect:   Appropriate   Thought Content: Clear   Insight:   Good         Plan/Need for Future Services:  Return for therapy in 1 week to treat diagnosed problems.    Patient has a current master individualized treatment plan.  See Epic treatment plan for more information.    Referral / Collaboration:  Referral to another professional/service is not indicated at this time..  Emergency Services Needed?  No    Assignment:  Self-care    Interactive Complexity:  There are four specific communication difficulties that complicate the work of the primary psychiatric procedure.  Interactive complexity (+18145) may be reported when at least one of these difficulties is present.    Communication difficulties present during current the psychiatric procedure include:  None.      Signature/Title:    Francisco Cannon, PhD, LP    [place] [facility], Sexual and Gender Health Clinic  Department of Family Medicine and Community Health  [employer] [facility] [employer]

## 2025-02-17 NOTE — NURSING NOTE
Current patient location: [address] [address]    Is the patient currently in the state of [place]? YES    Visit mode: VIDEO    If the visit is dropped, the patient can be reconnected by:VIDEO VISIT: Send to e-mail at: [email or web address]    Will anyone else be joining the visit? NO  (If patient encounters technical issues they should call [phone])    Are changes needed to the allergy or medication list? N/A    Are refills needed on medications prescribed by this physician? NO    Rooming Documentation:  Not applicable    Reason for visit: RECHECK    Emilie CARLTON

## 2025-02-24 ENCOUNTER — VIRTUAL VISIT (OUTPATIENT)
Dept: PSYCHOLOGY | Facility: CLINIC | Age: 30
End: 2025-02-24
Payer: COMMERCIAL

## 2025-02-24 ENCOUNTER — TELEPHONE (OUTPATIENT)
Dept: PSYCHIATRY | Facility: CLINIC | Age: 30
End: 2025-02-24
Payer: COMMERCIAL

## 2025-02-24 DIAGNOSIS — F32.A DEPRESSION, UNSPECIFIED DEPRESSION TYPE: ICD-10-CM

## 2025-02-24 DIAGNOSIS — F43.10 POST-TRAUMATIC STRESS DISORDER: ICD-10-CM

## 2025-02-24 DIAGNOSIS — F33.1 MODERATE EPISODE OF RECURRENT MAJOR DEPRESSIVE DISORDER (H): Primary | ICD-10-CM

## 2025-02-24 DIAGNOSIS — F15.20 SEVERE STIMULANT USE DISORDER (H): ICD-10-CM

## 2025-02-24 DIAGNOSIS — F41.1 GENERALIZED ANXIETY DISORDER: ICD-10-CM

## 2025-02-24 PROCEDURE — 90837 PSYTX W PT 60 MINUTES: CPT | Mod: 95 | Performed by: STUDENT IN AN ORGANIZED HEALTH CARE EDUCATION/TRAINING PROGRAM

## 2025-02-24 RX ORDER — ESCITALOPRAM OXALATE 5 MG/1
5 TABLET ORAL DAILY
Qty: 30 TABLET | Refills: 0 | Status: SHIPPED | OUTPATIENT
Start: 2025-02-24

## 2025-02-24 RX ORDER — ESCITALOPRAM OXALATE 10 MG/1
10 TABLET ORAL DAILY
Qty: 30 TABLET | Refills: 0 | Status: SHIPPED | OUTPATIENT
Start: 2025-02-24

## 2025-02-24 NOTE — TELEPHONE ENCOUNTER
Last seen: 1/29/2025  RTC: 4 weeks  Any patient initiated cancellations or no shows since last visit? No  Next appt: None  Last filled: 1/24/2025     Incoming refill from pharmacy via fax by pharmacy    Is note signed/closed? Yes    Is this a 90 day request for a psych medication? No    From chart note:     Medication refill approved per refill protocol.

## 2025-02-24 NOTE — NURSING NOTE
Is the patient currently in the state of [place]? YES    Current patient location: [address] [address]    Visit mode:Video    If the visit is dropped, the patient can be reconnected by: VIDEO VISIT:  Send e-mail to at [email or web address]    Will anyone else be joining the visit? No  (If patient encounters technical issues they should call [phone])    Are changes needed to the allergy or medication list? N/A    Are refills needed on medications prescribed by this physician? No    Rooming Documentation: Questionnaire(s) not done per department protocol.    Reason for visit: RECHECK     BIANKA Burnham

## 2025-02-24 NOTE — PROGRESS NOTES
[facility] - Progress Note    Date of Service: 25   Name: Sid Miranda  : 1995  Medical Record Number: [id number]  Treating Provider: Francisco Cannon, PhD  Type of Session: Individual  Present in Session: Client and therapist  Session Start and Stop Time: 3:00pm-3:54pm  Number of Minutes:  54    SERVICE MODALITY:  Video Visit:      Provider verified identity through the following two step process.  Patient provided:  Patient is known previously to provider and Patient was verified at admission/transfer    Telemedicine Visit: The patient's condition can be safely assessed and treated via synchronous audio and visual telemedicine encounter.      Reason for Telemedicine Visit: Patient has requested telehealth visit    Originating Site (Patient Location): Patient's home    Distant Site (Provider Location): [facility]    Consent:  The patient/guardian has verbally consented to: the potential risks and benefits of telemedicine (video visit) versus in person care; bill my insurance or make self-payment for services provided; and responsibility for payment of non-covered services.     Patient would like the video invitation sent by:  My Chart    Mode of Communication:  Video Conference via [employer]    Distant Location (Provider):  On-site    As the provider I attest to compliance with applicable laws and regulations related to telemedicine.      DSM-5 Diagnoses:  Encounter Diagnoses   Name Primary?    Moderate episode of recurrent major depressive disorder (H) Yes    Generalized anxiety disorder     Post-traumatic stress disorder     Severe stimulant use disorder (H) in early remission      Current Reported Symptoms and Status update:  Changes since last session includes general improvement in mood, anxiety, and trauma symptoms; maintained sobriety from meth.    Post Traumatic Stress Disorder: Client reported a history of traumatic events meeting  criteria for diagnosis. Client experiences recurrent memories, troubling dreams, physiological reactivity in response to memories/reminders of traumatic events, and trauma-related avoidance.    Generalized Anxiety Disorder: Excessive anxiety and worry about a number of events or activities (such as work or school performance); difficulty controlling worry; restlessness or feeling keyed up or on edge; difficulty concentrating or mind going blank; irritability.      Depression: Client reports hopelessness, depressed mood, irritability, anhedonia, low self-worth, isolation, fatigue, poor sleep, decreased functioning.      Severe Stimulant Use Disorder: Client has a history of methamphetamine use that significantly negatively impacts functioning, including financial stress, loss of employment, and loss of relationships. He continued to use despite negative consequences. Client has a history of sexual behavior he describes as compulsive when using meth. Further assessment needed to determine if compulsive sexual behaviors also occur separately from meth use.     Progress Toward Treatment Goals:   Satisfactory progress     Therapeutic Interventions/Treatment Strategies:    Area(s) of treatment focus addressed in this session included Symptom Management    Client shared that he has been doing well the past week. We reviewed coping and self-care for the coming week. We continued last week's exercise of learning more about client's autobiographical history contributing to sense of self, core beliefs, and patterns in coping/functioning. Client explored themes related to wanting to belong, developing identity, tension/conflict with his older brother, and desire for attention. He also shared impacts of medical and mental health on his relationships and sense of self. We noted a shift in affect when starting to explore his middle-school years and planned to review this part of his history in next session.     Psychotherapist  offered support, feedback and validation and reinforced use of skills Treatment modalities used include Cognitive Behavioral Therapy  Support and Feedback    Patient Response:   Patient responded to session by accepting feedback, giving feedback, listening, focusing on goals, and accepting support  Possible barriers to participation / learning include: N/A    Current Mental Status Exam:   Appearance:  Appropriate   Eye Contact:  Good   Attitude / Demeanor: Friendly  Speech      Rate / Production: Normal/ Responsive      Volume:  Normal  volume  Orientation:  All  Mood:   Normal  Affect:   Appropriate   Thought Content: Clear   Insight:   Good     Plan/Need for Future Services:  Return for therapy in 1 week to treat diagnosed problems.    Patient has a current master individualized treatment plan.  See Epic treatment plan for more information.    Referral / Collaboration:  Referral to another professional/service is not indicated at this time..  Emergency Services Needed?  No    Assignment:  Continue self-care    Interactive Complexity:  There are four specific communication difficulties that complicate the work of the primary psychiatric procedure.  Interactive complexity (+77328) may be reported when at least one of these difficulties is present.    Communication difficulties present during current the psychiatric procedure include:  None.      Signature/Title:      Francisco Cannon, PhD, LP    [facility], [facility]  Department of Family Medicine and Community Health  [employer] [employer]

## 2025-03-03 ENCOUNTER — VIRTUAL VISIT (OUTPATIENT)
Dept: PSYCHOLOGY | Facility: CLINIC | Age: 30
End: 2025-03-03
Payer: COMMERCIAL

## 2025-03-03 DIAGNOSIS — F33.1 MODERATE EPISODE OF RECURRENT MAJOR DEPRESSIVE DISORDER (H): Primary | ICD-10-CM

## 2025-03-03 DIAGNOSIS — F43.10 POST-TRAUMATIC STRESS DISORDER: ICD-10-CM

## 2025-03-03 DIAGNOSIS — F15.20 SEVERE STIMULANT USE DISORDER (H): ICD-10-CM

## 2025-03-03 DIAGNOSIS — F41.1 GENERALIZED ANXIETY DISORDER: ICD-10-CM

## 2025-03-03 PROCEDURE — 90834 PSYTX W PT 45 MINUTES: CPT | Mod: 95 | Performed by: STUDENT IN AN ORGANIZED HEALTH CARE EDUCATION/TRAINING PROGRAM

## 2025-03-03 NOTE — NURSING NOTE
Current patient location: [address] [address]    Is the patient currently in the state of [place]? YES    Visit mode: VIDEO    If the visit is dropped, the patient can be reconnected by:VIDEO VISIT: Text to cell phone:   Telephone Information:   Mobile [phone]       Will anyone else be joining the visit? NO  (If patient encounters technical issues they should call [phone])    Are changes needed to the allergy or medication list? N/A    Are refills needed on medications prescribed by this physician? NO    Rooming Documentation:  Questionnaire(s) not done per department protocol    Reason for visit: RECHECK    Dhruv CARLTON

## 2025-03-03 NOTE — PROGRESS NOTES
[facility] - Progress Note    Date of Service: 3/03/25   Name: Sid Miranda  : 1995  Medical Record Number: [id number]  Treating Provider: Francisco Cannon, PhD  Type of Session: Individual  Present in Session: Client and therapist  Session Start and Stop Time: 10:05am-10:56am  Number of Minutes:  51    SERVICE MODALITY:  Video Visit:      Provider verified identity through the following two step process.  Patient provided:  Patient is known previously to provider and Patient was verified at admission/transfer    Telemedicine Visit: The patient's condition can be safely assessed and treated via synchronous audio and visual telemedicine encounter.      Reason for Telemedicine Visit: Patient has requested telehealth visit    Originating Site (Patient Location): Patient's home    Distant Site (Provider Location): [facility]    Consent:  The patient/guardian has verbally consented to: the potential risks and benefits of telemedicine (video visit) versus in person care; bill my insurance or make self-payment for services provided; and responsibility for payment of non-covered services.     Patient would like the video invitation sent by:  My Chart    Mode of Communication:  Video Conference via [employer]    Distant Location (Provider):  On-site    As the provider I attest to compliance with applicable laws and regulations related to telemedicine.      DSM-5 Diagnoses:  Encounter Diagnoses   Name Primary?    Generalized anxiety disorder     Moderate episode of recurrent major depressive disorder (H) Yes    Severe stimulant use disorder (H) in early remission     Post-traumatic stress disorder          Current Reported Symptoms and Status update:  Changes since last session includes maintaining self-care, general improvement in depression and anxiety, maintained sobriety from meth, exploring his sense of self and identity.    Post Traumatic Stress  Disorder: Client reported a history of traumatic events meeting criteria for diagnosis. Client experiences recurrent memories, troubling dreams, physiological reactivity in response to memories/reminders of traumatic events, and trauma-related avoidance.    Generalized Anxiety Disorder: Excessive anxiety and worry about a number of events or activities (such as work or school performance); difficulty controlling worry; restlessness or feeling keyed up or on edge; difficulty concentrating or mind going blank; irritability.      Depression: Client reports hopelessness, depressed mood, irritability, anhedonia, low self-worth, isolation, fatigue, poor sleep, decreased functioning.      Severe Stimulant Use Disorder: Client has a history of methamphetamine use that significantly negatively impacts functioning, including financial stress, loss of employment, and loss of relationships. He continued to use despite negative consequences. Client has a history of sexual behavior he describes as compulsive when using meth. Further assessment needed to determine if compulsive sexual behaviors also occur separately from meth use.     Progress Toward Treatment Goals:   Satisfactory progress     Therapeutic Interventions/Treatment Strategies:    Area(s) of treatment focus addressed in this session included Symptom Management and Interpersonal Relationship Skills    Client shared recent updates and stressors. He highlighted his routine and sense of safety he feels from it. We continued exploring client's negative core beliefs (inadequacy, unlovable) and early experiences that shaped or reinforced them. We connected these experiences and feelings to more recent concerns. Therapist provided reframing to support more self-compassionate ways for client to understand needs and early experiences. We discussed more effective ways client has been having needs for belonging met. We reviewed self-care and routine for the coming  week.    Psychotherapist offered support, feedback and validation and provided redirection Treatment modalities used include Cognitive Behavioral Therapy  Support, Feedback, and Clarification    Patient Response:   Patient responded to session by accepting feedback, giving feedback, listening, focusing on goals, and accepting support  Possible barriers to participation / learning include: N/A    Current Mental Status Exam:   Appearance:  Appropriate   Eye Contact:  Good   Attitude / Demeanor: Friendly  Speech      Rate / Production: Normal/ Responsive      Volume:  Normal  volume  Orientation:  All  Mood:   Normal  Affect:   Appropriate   Thought Content: Clear   Insight:   Good         Plan/Need for Future Services:  Return for therapy in 1-2 weeks to treat diagnosed problems.    Patient has a current master individualized treatment plan.  See Epic treatment plan for more information.    Referral / Collaboration:  Referral to another professional/service is not indicated at this time..  Emergency Services Needed?  No    Assignment:  Self-care  Routine    Interactive Complexity:  There are four specific communication difficulties that complicate the work of the primary psychiatric procedure.  Interactive complexity (+58342) may be reported when at least one of these difficulties is present.    Communication difficulties present during current the psychiatric procedure include:  None.      Signature/Title:    Francisco Cannon, PhD, LP    [facility], [facility]  Department of Family Medicine and Community Health  [employer] [facility] [employer]

## 2025-03-10 ENCOUNTER — VIRTUAL VISIT (OUTPATIENT)
Dept: PSYCHOLOGY | Facility: CLINIC | Age: 30
End: 2025-03-10
Payer: COMMERCIAL

## 2025-03-10 DIAGNOSIS — F15.20 SEVERE STIMULANT USE DISORDER (H): ICD-10-CM

## 2025-03-10 DIAGNOSIS — F43.10 POST-TRAUMATIC STRESS DISORDER: ICD-10-CM

## 2025-03-10 DIAGNOSIS — F41.1 GENERALIZED ANXIETY DISORDER: ICD-10-CM

## 2025-03-10 DIAGNOSIS — F33.1 MODERATE EPISODE OF RECURRENT MAJOR DEPRESSIVE DISORDER (H): Primary | ICD-10-CM

## 2025-03-10 NOTE — NURSING NOTE
Is the patient currently in the state of [place]? YES    Current patient location: [address] [address]    Visit mode:Video    If the visit is dropped, the patient can be reconnected by: VIDEO VISIT: Text to cell phone:   Telephone Information:   Mobile [phone]       Will anyone else be joining the visit? No  (If patient encounters technical issues they should call [phone])    Are changes needed to the allergy or medication list? No    Are refills needed on medications prescribed by this physician? Discuss with Provider    Rooming Documentation: [facility]    Reason for visit: BIANKA Whitfield

## 2025-03-10 NOTE — PROGRESS NOTES
Sexual and Gender Health Clinic - Progress Note    Date of Service: 3/10/25   Name: Sid Miranda  : 1995  Medical Record Number: [id number]  Treating Provider: Francisco Cannon, PhD  Type of Session: Individual  Present in Session: Client and therapist  Session Start and Stop Time: 10:01am-10:56am  Number of Minutes:  55    SERVICE MODALITY:  Video Visit:      Provider verified identity through the following two step process.  Patient provided:  Patient is known previously to provider and Patient was verified at admission/transfer    Telemedicine Visit: The patient's condition can be safely assessed and treated via synchronous audio and visual telemedicine encounter.      Reason for Telemedicine Visit: Patient has requested telehealth visit    Originating Site (Patient Location): Patient's home    Distant Site (Provider Location): [facility]    Consent:  The patient/guardian has verbally consented to: the potential risks and benefits of telemedicine (video visit) versus in person care; bill my insurance or make self-payment for services provided; and responsibility for payment of non-covered services.     Patient would like the video invitation sent by:  My Chart    [facility] of Communication:  [facility]    Distant Location (Provider):  On-site    As the provider I attest to compliance with applicable laws and regulations related to telemedicine.      DSM-5 Diagnoses:  Encounter Diagnoses   Name Primary?    Moderate episode of recurrent major depressive disorder (H) Yes    Generalized anxiety disorder     Post-traumatic stress disorder     Severe stimulant use disorder (H) in early remission          Current Reported Symptoms and Status update:  Changes since last session includes improvement in depression and anxiety; improvement in PTSD symptoms, maintained sobriety from meth.    Post Traumatic Stress Disorder: Client reported a history of traumatic  events meeting criteria for diagnosis. Client experiences recurrent memories, troubling dreams, physiological reactivity in response to memories/reminders of traumatic events, and trauma-related avoidance.    Generalized Anxiety Disorder: Excessive anxiety and worry about a number of events or activities (such as work or school performance); difficulty controlling worry; restlessness or feeling keyed up or on edge; difficulty concentrating or mind going blank; irritability.      Depression: Client reports hopelessness, depressed mood, irritability, anhedonia, low self-worth, isolation, fatigue, poor sleep, decreased functioning.      Severe Stimulant Use Disorder: Client has a history of methamphetamine use that significantly negatively impacts functioning, including financial stress, loss of employment, and loss of relationships. He continued to use despite negative consequences. Client has a history of sexual behavior he describes as compulsive when using meth. Further assessment needed to determine if compulsive sexual behaviors also occur separately from meth use.     Progress Toward Treatment Goals:   Satisfactory progress     Therapeutic Interventions/Treatment Strategies:    Area(s) of treatment focus addressed in this session included Symptom Management, Interpersonal Relationship Skills, and Sexual Health and Wellness    Client shared plans for the day and we highlighted how client's sobriety and focus on health/coping are helping him be in control in life. Client continued autobiographical history to understand early or formative experiences that impact mental health and substance use concerns today. Client shared his medication history, including trying Adderall, which he identified as a potential contributor to developing substance use disorders. We also discussed client's sexual and relationship history, including behaviors he felt weren't well boundaried. Client also identified experiences of shaming  associated with internalized negative emotional reactions. Client processed current emotional reactions to reviewing his history. Client shared that he signed up to present his story in May - with 6 months sobriety time being encouraged by his program before sharing. We discussed how the work we are currently doing in understanding his precipitating and perpetuating factors could feed into his plan to be a speaker in his meeting.    Psychotherapist offered support, feedback and validation and reinforced use of skills Treatment modalities used include Cognitive Behavioral Therapy  Support, Feedback, and Clarification    Patient Response:   Patient responded to session by accepting feedback, giving feedback, listening, focusing on goals, and accepting support  Possible barriers to participation / learning include: N/A    Current Mental Status Exam:   Appearance:  Appropriate   Eye Contact:  Good   Attitude / Demeanor: Friendly  Speech      Rate / Production: Normal/ Responsive      Volume:  Normal  volume  Orientation:  All  Mood:   Normal  Affect:   Appropriate   Thought Content: Clear   Insight:   Good         Plan/Need for Future Services:  Return for therapy in 1 week to treat diagnosed problems.    Patient has a current master individualized treatment plan.  See Epic treatment plan for more information.    Referral / Collaboration:  Referral to another professional/service is not indicated at this time..  Emergency Services Needed?  No    Assignment:  Self-care    Interactive Complexity:  There are four specific communication difficulties that complicate the work of the primary psychiatric procedure.  Interactive complexity (+56948) may be reported when at least one of these difficulties is present.    Communication difficulties present during current the psychiatric procedure include:  None.      Signature/Title:      Francisco Cannon, PhD, LP    [place], Sexual  and Gender Health Clinic  Department of Family Medicine and Community Health  [facility]

## 2025-03-17 ENCOUNTER — VIRTUAL VISIT (OUTPATIENT)
Dept: PSYCHOLOGY | Facility: CLINIC | Age: 30
End: 2025-03-17
Payer: COMMERCIAL

## 2025-03-17 DIAGNOSIS — F15.20 SEVERE STIMULANT USE DISORDER (H): ICD-10-CM

## 2025-03-17 DIAGNOSIS — F43.10 POST-TRAUMATIC STRESS DISORDER: ICD-10-CM

## 2025-03-17 DIAGNOSIS — F33.1 MODERATE EPISODE OF RECURRENT MAJOR DEPRESSIVE DISORDER (H): Primary | ICD-10-CM

## 2025-03-17 DIAGNOSIS — F41.1 GENERALIZED ANXIETY DISORDER: ICD-10-CM

## 2025-03-17 NOTE — NURSING NOTE
Current patient location: Patient declined to provide     Is the patient currently in the state of [place]? YES    Visit mode: VIDEO    If the visit is dropped, the patient can be reconnected by:VIDEO VISIT: Text to cell phone:   Telephone Information:   Mobile [phone]       Will anyone else be joining the visit? NO  (If patient encounters technical issues they should call [phone])    Are changes needed to the allergy or medication list? N/A    Are refills needed on medications prescribed by this physician? NO    Rooming Documentation:  Questionnaire(s) not done per department protocol    Reason for visit: RECHECK    Dhruv CARLTON
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
## 2025-03-24 ENCOUNTER — VIRTUAL VISIT (OUTPATIENT)
Dept: PSYCHOLOGY | Facility: CLINIC | Age: 30
End: 2025-03-24
Payer: COMMERCIAL

## 2025-03-24 DIAGNOSIS — F43.10 POST-TRAUMATIC STRESS DISORDER: ICD-10-CM

## 2025-03-24 DIAGNOSIS — F41.1 GENERALIZED ANXIETY DISORDER: ICD-10-CM

## 2025-03-24 DIAGNOSIS — F33.1 MODERATE EPISODE OF RECURRENT MAJOR DEPRESSIVE DISORDER (H): Primary | ICD-10-CM

## 2025-03-24 DIAGNOSIS — F15.20 SEVERE STIMULANT USE DISORDER (H): ICD-10-CM

## 2025-03-24 NOTE — NURSING NOTE
Is the patient currently in the state of [place]? YES    Current patient location: [address] [address]    Visit mode:Video    If the visit is dropped, the patient can be reconnected by: VIDEO VISIT: Text to cell phone:   Telephone Information:   Mobile [phone]       Will anyone else be joining the visit? No  (If patient encounters technical issues they should call [phone])    Are changes needed to the allergy or medication list? N/A    Are refills needed on medications prescribed by this physician? No    Rooming Documentation: Questionnaire(s) not done per department protocol.    Reason for visit: RECHECK     BIANKA Delgado

## 2025-03-24 NOTE — PROGRESS NOTES
Sexual and Gender Health Clinic - Progress Note    Date of Service: 3/24/25   Name: Sid Miranda  : 1995  Medical Record Number: [id number]  Treating Provider: Francisco Cannon, PhD  Type of Session: Individual  Present in Session: Client and therapist  Session Start and Stop Time: 9:03am-9:51am  Number of Minutes:  48    SERVICE MODALITY:  Video Visit:      Provider verified identity through the following two step process.  Patient provided:  Patient is known previously to provider and Patient was verified at admission/transfer    Telemedicine Visit: The patient's condition can be safely assessed and treated via synchronous audio and visual telemedicine encounter.      Reason for Telemedicine Visit: Patient has requested telehealth visit    Originating Site (Patient Location): Patient's home    Distant Site (Provider Location): [facility]    Consent:  The patient/guardian has verbally consented to: the potential risks and benefits of telemedicine (video visit) versus in person care; bill my insurance or make self-payment for services provided; and responsibility for payment of non-covered services.     Patient would like the video invitation sent by:  My Chart    Mode of Communication:  [facility]    Distant Location (Provider):  On-site    As the provider I attest to compliance with applicable laws and regulations related to telemedicine.      DSM-5 Diagnoses:  Encounter Diagnoses   Name Primary?    Moderate episode of recurrent major depressive disorder (H) Yes    Generalized anxiety disorder     Post-traumatic stress disorder     Severe stimulant use disorder (H)          Current Reported Symptoms and Status update:  Changes since last session includes improvement in depression, anxiety, and PTSD symptoms; maintained sobriety from meth; increased dreams related to history; focusing on consistency and routine.    Post Traumatic Stress Disorder:  Client reported a history of traumatic events meeting criteria for diagnosis. Client experiences recurrent memories, troubling dreams, physiological reactivity in response to memories/reminders of traumatic events, and trauma-related avoidance.    Generalized Anxiety Disorder: Excessive anxiety and worry about a number of events or activities (such as work or school performance); difficulty controlling worry; restlessness or feeling keyed up or on edge; difficulty concentrating or mind going blank; irritability.      Depression: Client reports hopelessness, depressed mood, irritability, anhedonia, low self-worth, isolation, fatigue, poor sleep, decreased functioning.      Severe Stimulant Use Disorder: Client has a history of methamphetamine use that significantly negatively impacts functioning, including financial stress, loss of employment, and loss of relationships. He continued to use despite negative consequences. Client has a history of sexual behavior he describes as compulsive when using meth. Further assessment needed to determine if compulsive sexual behaviors also occur separately from meth use.     Progress Toward Treatment Goals:   Satisfactory progress     Therapeutic Interventions/Treatment Strategies:    Area(s) of treatment focus addressed in this session included Symptom Management, Interpersonal Relationship Skills, and Sexual Health and Wellness    Client shared his routine for self-care and sobriety. He discussed recent dreams related to arousal and themes from treatment. He discussed feeling badly about some of the dreams. We discussed concerns related to sleep and waking up. Client discussed decreased interest in sexual activity and effects on his relationship. We explored factors potentially related to depression and/or his current relationship and stressors. Therapist supported client to explore activities he is interested in to assess anhedonia. We also explored a potential bidirectional  relationship between depression and sexuality. Client also explored the weight placed on his sense of self related to sexuality.    Psychotherapist offered support, feedback and validation and reinforced use of skills Treatment modalities used include Cognitive Behavioral Therapy  Support, Feedback, and Clarification    Patient Response:   Patient responded to session by accepting feedback, giving feedback, listening, focusing on goals, and accepting support  Possible barriers to participation / learning include: N/A    Current Mental Status Exam:   Appearance:  Appropriate   Eye Contact:  Good   Attitude / Demeanor: Friendly  Speech      Rate / Production: Normal/ Responsive      Volume:  Normal  volume  Orientation:  All  Mood:   Normal  Affect:   Appropriate   Thought Content: Clear   Insight:   Good         Plan/Need for Future Services:  Return for therapy in 1 week to treat diagnosed problems.    Patient has a current master individualized treatment plan.  See Epic treatment plan for more information.    Referral / Collaboration:  Referral to another professional/service is not indicated at this time..  Emergency Services Needed?  No    Assignment:  Write about when he remembers deriving self-worth from sexuality  Factors/context contributing to current issues - counter to determinism    Interactive Complexity:  There are four specific communication difficulties that complicate the work of the primary psychiatric procedure.  Interactive complexity (+58283) may be reported when at least one of these difficulties is present.    Communication difficulties present during current the psychiatric procedure include:  None.      Signature/Title:    Francisco Cannon, PhD, LP    [place] [facility], Sexual and Gender Health Clinic  Department of Family Medicine and Community Health  [employer] [facility] [employer] [facility] [employer]

## 2025-03-31 ENCOUNTER — VIRTUAL VISIT (OUTPATIENT)
Dept: PSYCHOLOGY | Facility: CLINIC | Age: 30
End: 2025-03-31
Payer: COMMERCIAL

## 2025-03-31 DIAGNOSIS — F41.1 GENERALIZED ANXIETY DISORDER: ICD-10-CM

## 2025-03-31 DIAGNOSIS — F43.10 POST-TRAUMATIC STRESS DISORDER: ICD-10-CM

## 2025-03-31 DIAGNOSIS — F15.20 SEVERE STIMULANT USE DISORDER (H): ICD-10-CM

## 2025-03-31 DIAGNOSIS — F33.1 MODERATE EPISODE OF RECURRENT MAJOR DEPRESSIVE DISORDER (H): Primary | ICD-10-CM

## 2025-03-31 NOTE — NURSING NOTE
Is the patient currently in the state of [place]? YES    Current patient location: [address] [facility] [address]  [facility] [address]    Visit mode:Video    If the visit is dropped, the patient can be reconnected by: VIDEO VISIT: Text to cell phone:   Telephone Information:   Mobile [phone]       Will anyone else be joining the visit? No  (If patient encounters technical issues they should call [phone])    Are changes needed to the allergy or medication list? N/A    Are refills needed on medications prescribed by this physician? No    Rooming Documentation: Questionnaire(s) not assigned, not done per department protocol.    Reason for visit: RECHECK     Su Sanders, HANANEF

## 2025-03-31 NOTE — PROGRESS NOTES
Sexual and Gender Health Clinic - Progress Note    Date of Service: 3/31/25   Name: Sid Miranda  : 1995  Medical Record Number: [id number]  Treating Provider: Francisco Cannon, PhD  Type of Session: Individual  Present in Session: Client and therapist  Session Start and Stop Time: 3:02pm-3:58pm  Number of Minutes:  56    SERVICE MODALITY:  Video Visit:      Provider verified identity through the following two step process.  Patient provided:  Patient is known previously to provider and Patient was verified at admission/transfer    Telemedicine Visit: The patient's condition can be safely assessed and treated via synchronous audio and visual telemedicine encounter.      Reason for Telemedicine Visit: Patient has requested telehealth visit    Originating Site (Patient Location): Patient's home    Distant Site (Provider Location): [facility]    Consent:  The patient/guardian has verbally consented to: the potential risks and benefits of telemedicine (video visit) versus in person care; bill my insurance or make self-payment for services provided; and responsibility for payment of non-covered services.     Patient would like the video invitation sent by:  My Chart    Mode of Communication:  [facility]    Distant Location (Provider):  On-site    As the provider I attest to compliance with applicable laws and regulations related to telemedicine.      DSM-5 Diagnoses:  Encounter Diagnoses   Name Primary?    Moderate episode of recurrent major depressive disorder (H) Yes    Generalized anxiety disorder     Post-traumatic stress disorder     Severe stimulant use disorder (H)          Current Reported Symptoms and Status update:  Changes since last session includes improvement in depression and anxiety, improvement in trauma-related symptoms, maintained sobriety from meth.    Post Traumatic Stress Disorder: Client reported a history of traumatic events  meeting criteria for diagnosis. Client experiences recurrent memories, troubling dreams, physiological reactivity in response to memories/reminders of traumatic events, and trauma-related avoidance.    Generalized Anxiety Disorder: Excessive anxiety and worry about a number of events or activities (such as work or school performance); difficulty controlling worry; restlessness or feeling keyed up or on edge; difficulty concentrating or mind going blank; irritability.      Depression: Client reports hopelessness, depressed mood, irritability, anhedonia, low self-worth, isolation, fatigue, poor sleep, decreased functioning.      Severe Stimulant Use Disorder: Client has a history of methamphetamine use that significantly negatively impacts functioning, including financial stress, loss of employment, and loss of relationships. He continued to use despite negative consequences. Client has a history of sexual behavior he describes as compulsive when using meth. Further assessment needed to determine if compulsive sexual behaviors also occur separately from meth use.     Progress Toward Treatment Goals:   Satisfactory progress     Therapeutic Interventions/Treatment Strategies:    Area(s) of treatment focus addressed in this session included Symptom Management and Sexual Health and Wellness    Client shared that his mood is overall better and he continues to focus on stability and consistency. We explored his history of messages and experiences related to self-worth and his body. He shared emotional experiences related to changes in his body size/appearance and how he has been treated because of it across the lifespan. We explored origins of his negative self-image and how the way people have treated him have affected his sense of self. He also discussed torres and [place] culture and effects on body image. We explored his current body image and self-concept. He identified how body image can be at trigger for meth use/relapse  and ways in which he is working on a more realistic sense of body and self. We reviewed self-care and healthy self-assessment.    Psychotherapist offered support, feedback and validation and reinforced use of skills Treatment modalities used include Cognitive Behavioral Therapy  Support, Feedback, and Structured Activity    Patient Response:   Patient responded to session by accepting feedback, giving feedback, listening, focusing on goals, and accepting support  Possible barriers to participation / learning include: N/A    Current Mental Status Exam:   Appearance:  Appropriate   Eye Contact:  Good   Attitude / Demeanor: Friendly  Speech      Rate / Production: Normal/ Responsive      Volume:  Normal  volume  Orientation:  All  Mood:   Normal  Affect:   Appropriate   Thought Content: Clear   Insight:   Good         Plan/Need for Future Services:  Return for therapy in 1 week to treat diagnosed problems.    Patient has a current master individualized treatment plan.  See Epic treatment plan for more information.    Referral / Collaboration:  Referral to another professional/service is not indicated at this time..  Emergency Services Needed?  No    Assignment:  Continue self-care and routine    Interactive Complexity:  There are four specific communication difficulties that complicate the work of the primary psychiatric procedure.  Interactive complexity (+28389) may be reported when at least one of these difficulties is present.    Communication difficulties present during current the psychiatric procedure include:  None.      Signature/Title:    Francisco Cannon, PhD, LP    [place] [facility], Sexual and Gender Health Clinic  Department of Family Medicine and Community Health  [employer] [facility] [employer] [facility] [employer]

## 2025-04-03 ENCOUNTER — TELEPHONE (OUTPATIENT)
Dept: PSYCHIATRY | Facility: CLINIC | Age: 30
End: 2025-04-03
Payer: COMMERCIAL

## 2025-04-03 DIAGNOSIS — F41.1 GENERALIZED ANXIETY DISORDER: ICD-10-CM

## 2025-04-03 DIAGNOSIS — F43.10 POST-TRAUMATIC STRESS DISORDER: ICD-10-CM

## 2025-04-03 DIAGNOSIS — F32.A DEPRESSION, UNSPECIFIED DEPRESSION TYPE: ICD-10-CM

## 2025-04-03 RX ORDER — ESCITALOPRAM OXALATE 5 MG/1
5 TABLET ORAL DAILY
Qty: 30 TABLET | Refills: 0 | Status: SHIPPED | OUTPATIENT
Start: 2025-04-03

## 2025-04-03 RX ORDER — ESCITALOPRAM OXALATE 10 MG/1
10 TABLET ORAL DAILY
Qty: 30 TABLET | Refills: 0 | Status: SHIPPED | OUTPATIENT
Start: 2025-04-03

## 2025-04-03 NOTE — TELEPHONE ENCOUNTER
[facility] Call Center    Phone Message    May a detailed message be left on voicemail: yes     Reason for Call: Medication Refill Request    Has the patient contacted the pharmacy for the refill? Yes   Name of medication being requested: Lexapro  Provider who prescribed the medication: Neville West PAC  Pharmacy:      [facility]) #[id number] [address] [facility] [address]  Date medication is needed: By tomorrow 4/4/25, pt only has enough meds left to last through tomorrow. Pt is scheduled for a follow up 4/17.         Action Taken: Message routed to:  Other: Provider    Tip Cruz on 4/3/2025 at 3:56 PM

## 2025-04-14 ENCOUNTER — VIRTUAL VISIT (OUTPATIENT)
Dept: PSYCHOLOGY | Facility: CLINIC | Age: 30
End: 2025-04-14
Payer: COMMERCIAL

## 2025-04-14 DIAGNOSIS — F15.20 SEVERE STIMULANT USE DISORDER (H): ICD-10-CM

## 2025-04-14 DIAGNOSIS — F41.1 GENERALIZED ANXIETY DISORDER: ICD-10-CM

## 2025-04-14 DIAGNOSIS — F33.1 MODERATE EPISODE OF RECURRENT MAJOR DEPRESSIVE DISORDER (H): Primary | ICD-10-CM

## 2025-04-14 DIAGNOSIS — F43.10 POST-TRAUMATIC STRESS DISORDER: ICD-10-CM

## 2025-04-14 NOTE — PROGRESS NOTES
[facility] - Progress Note    Date of Service: 25   Name: Sid Mrianda  : 1995  Medical Record Number: [id number]  Treating Provider: Francisco Cannon, PhD  Type of Session: Individual  Present in Session: Client and therapist  Session Start and Stop Time: 10:04am-10:55am  Number of Minutes:  51    SERVICE MODALITY:  Video Visit:      Provider verified identity through the following two step process.  Patient provided:  Patient is known previously to provider and Patient was verified at admission/transfer    Telemedicine Visit: The patient's condition can be safely assessed and treated via synchronous audio and visual telemedicine encounter.      Reason for Telemedicine Visit: Patient has requested telehealth visit    Originating Site (Patient Location): Patient's home    Distant Site (Provider Location): [facility]    Consent:  The patient/guardian has verbally consented to: the potential risks and benefits of telemedicine (video visit) versus in person care; bill my insurance or make self-payment for services provided; and responsibility for payment of non-covered services.     Patient would like the video invitation sent by:  My Chart    Mode of Communication:  [facility] Conference [facility]    Distant Location (Provider):  On-site    As the provider I attest to compliance with applicable laws and regulations related to telemedicine.      DSM-5 Diagnoses:  Encounter Diagnoses   Name Primary?    Moderate episode of recurrent major depressive disorder (H) Yes    Severe stimulant use disorder (H)     Generalized anxiety disorder     Post-traumatic stress disorder          Current Reported Symptoms and Status update:  Changes since last session includes improvement in depression, anxiety, and trauma-symptoms. Maintained sobriety from meth. Recently attended regional NA conference and felt more connection/community.    Post Traumatic Stress  Disorder: Client reported a history of traumatic events meeting criteria for diagnosis. Client experiences recurrent memories, troubling dreams, physiological reactivity in response to memories/reminders of traumatic events, and trauma-related avoidance.    Generalized Anxiety Disorder: Excessive anxiety and worry about a number of events or activities (such as work or school performance); difficulty controlling worry; restlessness or feeling keyed up or on edge; difficulty concentrating or mind going blank; irritability.      Depression: Client reports hopelessness, depressed mood, irritability, anhedonia, low self-worth, isolation, fatigue, poor sleep, decreased functioning.      Severe Stimulant Use Disorder: Client has a history of methamphetamine use that significantly negatively impacts functioning, including financial stress, loss of employment, and loss of relationships. He continued to use despite negative consequences. Client has a history of sexual behavior he describes as compulsive when using meth. Further assessment needed to determine if compulsive sexual behaviors also occur separately from meth use.     Progress Toward Treatment Goals:   Satisfactory progress     Therapeutic Interventions/Treatment Strategies:    Area(s) of treatment focus addressed in this session included Symptom Management    Client shared ways in which he is focusing on stability, consistency, and recovery. He described overall improvement in symptoms. He noted low sexual desire and is working on acceptance to reduce pressure. We discussed ways in which he experiences intimacy with his partner. He shared updates on his recovery process and having recently earned his 6-month NA keychain. He attended the [place] NA conference and found it helpful to connect with community. We reviewed his self-care practice and ways in which he is prioritizing mental health and sobriety.     Psychotherapist offered support, feedback and validation and  reinforced use of skills Treatment modalities used include Cognitive Behavioral Therapy  Support and Feedback    Patient Response:   Patient responded to session by accepting feedback, giving feedback, listening, focusing on goals, and accepting support  Possible barriers to participation / learning include: N/A    Current Mental Status Exam:   Appearance:  Appropriate   Eye Contact:  Good   Attitude / Demeanor: Friendly  Speech      Rate / Production: Normal/ Responsive      Volume:  Normal  volume  Orientation:  All  Mood:   Normal  Affect:   Appropriate   Thought Content: Clear   Insight:   Good       Plan/Need for Future Services:  Return for therapy in 1-2 weeks to treat diagnosed problems.    Patient has a current master individualized treatment plan.  See Epic treatment plan for more information.    Referral / Collaboration:  Referral to another professional/service is not indicated at this time..  Emergency Services Needed?  No    Assignment:  Continue self-care    Interactive Complexity:  There are four specific communication difficulties that complicate the work of the primary psychiatric procedure.  Interactive complexity (+63407) may be reported when at least one of these difficulties is present.    Communication difficulties present during current the psychiatric procedure include:  None.      Signature/Title:    Francisco Cannon, PhD, LP    [facility], [facility]  Department of Family Medicine and Community Health  [employer]

## 2025-04-14 NOTE — NURSING NOTE
Current patient location: [address] [address]    Is the patient currently in the state of [place]? YES    Visit mode: VIDEO    If the visit is dropped, the patient can be reconnected by:VIDEO VISIT: Text to cell phone:   Telephone Information:   Mobile [phone]       Will anyone else be joining the visit? NO  (If patient encounters technical issues they should call [phone]    Are changes needed to the allergy or medication list? N/A    Are refills needed on medications prescribed by this physician? NO    Rooming Documentation:  Not applicable    Reason for visit: RECHFARA CARLTON

## 2025-04-17 ENCOUNTER — VIRTUAL VISIT (OUTPATIENT)
Dept: PSYCHIATRY | Facility: CLINIC | Age: 30
End: 2025-04-17
Payer: COMMERCIAL

## 2025-04-17 DIAGNOSIS — F15.20 SEVERE STIMULANT USE DISORDER (H): ICD-10-CM

## 2025-04-17 RX ORDER — NALTREXONE HYDROCHLORIDE 50 MG/1
50 TABLET, FILM COATED ORAL DAILY
Qty: 30 TABLET | Refills: 0 | Status: SHIPPED | OUTPATIENT
Start: 2025-04-17

## 2025-04-17 NOTE — PROGRESS NOTES
Virtual Visit Details    Type of service:  Video Visit   Video Start Time:  1:10p  Video End Time: 1:20p    Originating Location (pt. Location): Home    Distant Location (provider location):  Off-site  Platform used for Video Visit: [facility]         CARE TEAM:    [facility]  Therapist- Francisco Cannon    Sid is a 29 year old who uses the pronouns he, him, his, himself.      Diagnoses        Post-traumatic stress disorder  Generalized anxiety disorder  Depression, unspecified depression type  Severe stimulant use disorder (H)     Assessment   Pt presents to clinic for a follow up. Conditions well controlled. No medication adjustments needed at this time he notes he is going to start prazosin for trauma related nightmares and the wellbutrin.     Future Considerations: none at this time.     Psychotropic Drug Interactions:   Lexapro and wellbutrin: increases wellbutrin.   Management: use lowest therapeutic doses of wellbutrin    [place]: not using controlled substances    Risk Statements:   Treatment Risk- Risks, benefits, alternatives and potential adverse effects have been discussed and are understood.   Safety Risk-Sid Lau did not appear to be an imminent safety risk to self or others.     Plan     1) Medications:   - CONTINUE prazosin 1mg at bedtime (take this if nightmares return).   - Continue lexapro 15mg.   - Continue naltrexone 50mg  -Continue:  wellbutrin XL 150mg.     2) Psychotherapy: continue    3) Next due:  Labs- Routine monitoring is not indicated for current psychotropic medication regimen   EKG- Routine monitoring is not indicated for current psychotropic medication regimen   Rating scales- none needed    4) Referrals: none    5) Other: none    6) Follow-up: Return to clinic in 4 weeks.        Pertinent Background                                                   [most recent eval 12/11/24]   Established care on 5/22/24  Reports past diagnosis of: substance abuse  disorder, generalized anxiety,      Seeing Francisco a bit over a year now. Narrowing goals for treatment. Barriers to improvement  Would like to address baseline depression and meth use.   First sought treatment: in late childhood. Saw therapy at age 8 and 11-also had psych meds and stopped psychotherapist around 14 and continued medsf ora few years. Therapisy in mid 20s.   Since then recovery at 24. Substance abuse treatment centers. Before francisco in DBT.   Couple of hospitalizations:   First at 11: 3 day thing.   Inpatient 12 (3 day eval after pretending suidicde attempt) and one at 13 (SIB).   Has community support. Closest friends in [facility] in active use.   Boyfriend in recovery   Had a sponsor and was fired.      Sid Miranda is a 28 year old White Not  or  male presenting for psychiatric evaluation and medication management. Information is obtained from patient and available records.  Reports history of    Generalized anxiety disorder  Depression, unspecified depression type  Post-traumatic stress disorder  Severe stimulant use disorder (H)   Previously psychiatrically hospitalized during adolescence various times and chemical dependency treatment. History of IOP as well.  Hx of suicidal ideation and attempts.   Hx of self-injurious behaviors in the form of cutting starting at age middle school.  Genetically loaded for  substance use, schizophrenia. Grew up in an intact home with all basic needs being met.     Sid was seen today for consult.     His stimulant use anxiety and depression are not controlled it is reassuring he is in therapy is currently working with therapist for harm reduction with stimulant use disorder.  I am prescribing Lexapro 5 mg to address anxiety and depression to better overall mood.  Follow-up in 1 month.      Today: 6/27/24:      Started lexapro briefly, get in next week with primary, imitrex prescribed for migraines no more episodes, end of that week. Use episide for  one day. Then over. Restarted lexapro for 3 weeks. Spoke with brenda. Increase depression. Couple other variables: Money issues and other variables impacting depression. Interested in naltrexone for substance use.  Sid was seen today for recheck.  Prescribed naltrexone for stimulant use. Advised him he may experience nausea and gave him zofran as needed. Increased lexapro to 10mg to address depression and anxiety. Spent time talking about safety due to passive thoughts of suicidal ideation and talked about resources if he needed them. Follow up in a month.      7/11/24: new dose of lexapro is fine. Naltrexone notes no side effects.   Reports suicidal thoughts. Most days have been better. Today, lower mood kind of tired. Contracts for safety.     Sid was seen today for recheck.  Provied refills of medications. Offered to increase lexapro to 15mg due to continued depressive symptoms. Anxiety not controlled. Follow up in a month.     8/22/24:   Notes things are going pretty well. New job at [employer]. Has a pull toward nonprofit work. Notes experiencing scattered nightmares a couple times a week he remembers it.  Wakes up at 1am and walks around and wonders if it's happening more than once a week. Sleep has been segmented. Naltrexone going well no nausea. Downtick in cravings.   Sid was seen today for recheck.  No adjistments needed to lexapro. Tolerating naltrexone well. Introduced the idea of prazosin for nightmares. He is willing to do some research on this topic. Conditions better controlled.        11/6/24: Doing well. No concerns at this time.      Sid was seen today for recheck.  Conditions better controlled. No refills at this time. Continuing lexapro and naltrexone.     12/11/24:   - decent, not quite as shiny. Get a couple months without regular consistent sleep. Taking medications and notes vivid dreams that wake him up.   Sid was seen today for a follow-up.  His PTSD is not controlled as he is  5 been experiencing nightmares that cause him to wake up in the middle the night we did discuss adding prazosin to help with this.     1/29/25:   -Doing well has not started the prazosin as nightmares resolved after picking up notes some sleep disruption but notes it is most likely due to sleep hygiene notes ongoing experience of lack of sexual arousal  Sid was seen today for a follow-up. Conditions well controlled but adding wellbutrin XL 150mg to treat sexual dysfunction attributed to lexapro. He has been on this before and it did induce anxiety and also used this to help him with his substance use. I do feel this would be beneficial for both mental health and history of substance use.    Subjective     Since the last visit:   -doing well notes he has nightmares related to trauma.     Current Social History:  Financial/occupational: employed  Living situation (partner, children, pets, etc): not assessed  Social/spiritual support: yes  Feels safe at home: not assessed    Medical Review of Systems:   Lightheadedness/orthostasis: None  Headaches: None  GI: none  Sexual health concerns: Lack of sexual arousal       Mental Status Exam     General/Constitutional:  Appearance:  awake, alert, adequately groomed, appeared stated age and no apparent distress  Attitude:   cooperative   Eye Contact:  good  Musculoskeletal:  Psychomotor Behavior:  no evidence of tardive dyskinesia, dystonia, or tics from the head up  Psychiatric:  Speech:  clear, coherent, regular rate, rhythm, and volume,  No pressure speech noted.  Associations:  no loose associations  Thought Process:  logical, linear and goal oriented  Thought Content:   No evidence of suicidal ideation or homicidal ideation, no evidence of psychotic thought, no auditory hallucinations present and no visual hallucinations present  Mood:  good  Affect:  full range/stable (normal variation of emotions during exam) and was congruent to speech content.  Insight:   good  Judgment:  intact, adequate for safety  Impulse Control:  intact  Neurological:  Oriented to:  person, place, time, and situation  Attention Span and Concentration:  Able to attend to the interview     Language: intact    Recent and Remote Memory:  Intact to interview. Not formally assessed. No amnesia.   Fund of Knowledge: appropriate         Past Psych Med Trials      Medication Max Dose (mg) Dates / Duration Helpful? DC Reason / Adverse Effects?   wellbutrin   denies Induced anxiety, lessen withdrawal experience.    zoloft   denies    lithium   denies    risperdal   denies    abilify   denies    tegredol   denies                            Treatment Course and Terrell Events since  JULY 2023   Established care on 5/22/24: His stimulant use anxiety and depression are not controlled it is reassuring he is in therapy is currently working with therapist for harm reduction with stimulant use disorder.  I am prescribing Lexapro 5 mg to address anxiety and depression to better overall mood.  Follow-up in 1 month.       6/27/24:Prescribed naltrexone for stimulant use. Advised him he may experience nausea and gave him zofran as needed. Increased lexapro to 10mg to address depression and anxiety. Spent time talking about safety due to passive thoughts of suicidal ideation and talked about resources if he needed them.      Today: 7/11/24: new dose of lexapro is fine. Naltrexone notes no side effects.   Reports suicidal thoughts. Most days have been better. Today, lower mood kind of tired. Contracts for safety.vProvied refills of medications. Offered to increase lexapro to 15mg due to continued depressive symptoms. Anxiety not controlled.    Today: 8/22/24: Notes things are going pretty well. New job at  [employer]. Has a pull toward nonprofit work. Notes experiencing scattered nightmares a couple times a week he remembers it.  Wakes up at 1am and walks around and wonders if it's happening more than once a week.  Sleep has been segmented. Naltrexone going well no nausea. Downtick in cravings. No adjustments needed to lexapro. Tolerating naltrexone well. Introduced the idea of prazosin for nightmares. He is willing to do some research on this topic. Conditions better controlled.     1/19/25: Started WellbutrinXL 150mg to offset effects of Lexapro on sexual arousal.    Vitals   There were no vitals taken for this visit.  Pulse Readings from Last 3 Encounters:   05/22/24 68     Wt Readings from Last 3 Encounters:   01/29/25 81.6 kg (180 lb)   11/06/24 86.2 kg (190 lb)   07/11/24 81.6 kg (180 lb)     BP Readings from Last 3 Encounters:   05/22/24 119/58        Medical History     ALLERGIES: Patient has no known allergies.    There is no problem list on file for this patient.       Medications     Current Outpatient Medications   Medication Sig Dispense Refill    buPROPion (WELLBUTRIN XL) 150 MG 24 hr tablet Take 1 tablet (150 mg) by mouth every morning. 30 tablet 0    escitalopram (LEXAPRO) 10 MG tablet Take 1 tablet (10 mg) by mouth daily. In addition to other escitalopram prescription totaling 15mg in the morning. 30 tablet 0    escitalopram (LEXAPRO) 5 MG tablet Take 1 tablet (5 mg) by mouth daily. 30 tablet 0    naltrexone (DEPADE/REVIA) 50 MG tablet Take 1 tablet (50 mg) by mouth daily. 30 tablet 0    ondansetron (ZOFRAN ODT) 4 MG ODT tab Take 1 tablet (4 mg) by mouth every 8 hours as needed for nausea 30 tablet 0    prazosin (MINIPRESS) 1 MG capsule Take 1 capsule (1 mg) by mouth at bedtime. 30 capsule 0        Labs and Data         3/21/2023     8:44 AM 5/23/2024    11:25 AM 1/29/2025     8:59 AM   PROMIS-10 Total Score w/o Sub Scores   PROMIS TOTAL - SUBSCORES 27 24    24 30        Patient-reported         3/21/2023     8:44 AM 12/16/2024     9:39 AM   CAGE-AID Total Score   Total Score 4 4    Total Score MyChart 4 (A total score of 2 or greater is considered clinically significant) 4 (A total score of 2 or greater is  considered clinically significant)       Patient-reported         12/16/2024     9:34 AM   PHQ-9 SCORE   PHQ-9 Total Score MyChart 9 (Mild depression)   PHQ-9 Total Score 9        Patient-reported         4/4/2023     8:55 AM 12/16/2024     9:36 AM   LORENZO-7 SCORE   Total Score 6 (mild anxiety) 5 (mild anxiety)   Total Score 6    6 5        Patient-reported       Liver/Kidney Function, TSH Metabolic Blood counts   No lab results found.  No lab results found. No lab results found.  No lab results found.  No lab results found. No lab results found.      .g  Administrative/Billing:  The longitudinal plan of care for the diagnosis(es)/condition(s) as documented were addressed during this visit. Due to the added complexity in care, I will continue to support Sid in the subsequent management and with ongoing continuity of care.   PROVIDER: Neville West PA-C

## 2025-04-17 NOTE — NURSING NOTE
Current patient location: [address] [address]    Is the patient currently in the state of [place]? YES    Visit mode: VIDEO    If the visit is dropped, the patient can be reconnected by:VIDEO VISIT: Text to cell phone:   Telephone Information:   Mobile [phone]       Will anyone else be joining the visit? NO  (If patient encounters technical issues they should call [phone]    Are changes needed to the allergy or medication list? No    Are refills needed on medications prescribed by this physician? NO    Rooming Documentation:  Patient declined to complete questionnaire(s)    Reason for visit: RECHECK    Susu CARLTON

## 2025-04-21 ENCOUNTER — VIRTUAL VISIT (OUTPATIENT)
Dept: PSYCHOLOGY | Facility: CLINIC | Age: 30
End: 2025-04-21
Payer: COMMERCIAL

## 2025-04-21 DIAGNOSIS — F15.20 SEVERE STIMULANT USE DISORDER (H): ICD-10-CM

## 2025-04-21 DIAGNOSIS — F43.10 POST-TRAUMATIC STRESS DISORDER: ICD-10-CM

## 2025-04-21 DIAGNOSIS — F41.1 GENERALIZED ANXIETY DISORDER: ICD-10-CM

## 2025-04-21 DIAGNOSIS — F33.1 MODERATE EPISODE OF RECURRENT MAJOR DEPRESSIVE DISORDER (H): Primary | ICD-10-CM

## 2025-04-21 NOTE — NURSING NOTE
Is the patient currently in the state of [place]? YES    Current patient location: [address] [address]    Visit mode:Video    If the visit is dropped, the patient can be reconnected by: VIDEO VISIT: Text to cell phone:   Telephone Information:   Mobile [phone]       Will anyone else be joining the visit? No  (If patient encounters technical issues they should call [phone])    Are changes needed to the allergy or medication list? N/A    Are refills needed on medications prescribed by this physician? No    Rooming Documentation: Questionnaire(s) not done per department protocol.    Reason for visit: RECHECK     BIANKA Delgado

## 2025-04-21 NOTE — PROGRESS NOTES
[facility] - Progress Note    Date of Service: 25   Name: Sid Miranda  : 1995  Medical Record Number: [id number]  Treating Provider: Francisco Cannon, PhD  Type of Session: Individual  Present in Session: Client and therapist  Session Start and Stop Time: 3:31pm-4:25pm  Number of Minutes:  54    SERVICE MODALITY:  Video Visit:      Provider verified identity through the following two step process.  Patient provided:  Patient is known previously to provider and Patient was verified at admission/transfer    Telemedicine Visit: The patient's condition can be safely assessed and treated via synchronous audio and visual telemedicine encounter.      Reason for Telemedicine Visit: Patient has requested telehealth visit    Originating Site (Patient Location): Patient's home    Distant Site (Provider Location): [facility]    Consent:  The patient/guardian has verbally consented to: the potential risks and benefits of telemedicine (video visit) versus in person care; bill my insurance or make self-payment for services provided; and responsibility for payment of non-covered services.     Patient would like the video invitation sent by:  My Chart    Mode of Communication:  [facility]    Distant Location (Provider):  On-site    As the provider I attest to compliance with applicable laws and regulations related to telemedicine.      DSM-5 Diagnoses:  Encounter Diagnoses   Name Primary?    Moderate episode of recurrent major depressive disorder (H) Yes    Generalized anxiety disorder     Post-traumatic stress disorder     Severe stimulant use disorder (H)          Current Reported Symptoms and Status update:  Changes since last session includes improvement in depression, anxiety, and trauma symptoms; experiencing anhedonia in ways that negatively impact relationship; maintained sobriety from stimulants.    Post Traumatic Stress  Disorder: Client reported a history of traumatic events meeting criteria for diagnosis. Client experiences recurrent memories, troubling dreams, physiological reactivity in response to memories/reminders of traumatic events, and trauma-related avoidance.    Generalized Anxiety Disorder: Excessive anxiety and worry about a number of events or activities (such as work or school performance); difficulty controlling worry; restlessness or feeling keyed up or on edge; difficulty concentrating or mind going blank; irritability.      Depression: Client reports hopelessness, depressed mood, irritability, anhedonia, low self-worth, isolation, fatigue, poor sleep, decreased functioning.      Severe Stimulant Use Disorder: Client has a history of methamphetamine use that significantly negatively impacts functioning, including financial stress, loss of employment, and loss of relationships. He continued to use despite negative consequences. Client has a history of sexual behavior he describes as compulsive when using meth. Further assessment needed to determine if compulsive sexual behaviors also occur separately from meth use.     Progress Toward Treatment Goals:   Satisfactory progress     Therapeutic Interventions/Treatment Strategies:    Area(s) of treatment focus addressed in this session included Symptom Management and Sexual Health and Wellness    Client shared updates in his relationship and work. He explored themes in his relationship around recovery and sexual health. Client also discussed his 12-step work in support of his sobriety. He identified challenges related to avoidance in his step work. Client also reflected on anhedonia and associations with depression and sobriety. We reviewed self-care goals and ways in which he is coping more effectively.     Psychotherapist offered support, feedback and validation and reinforced use of skills Treatment modalities used include Cognitive Behavioral Therapy  Support and  Feedback    Patient Response:   Patient responded to session by accepting feedback, giving feedback, listening, focusing on goals, and accepting support  Possible barriers to participation / learning include: N/A    Current Mental Status Exam:   Appearance:  Appropriate   Eye Contact:  Good   Attitude / Demeanor: Friendly  Speech      Rate / Production: Normal/ Responsive      Volume:  Normal  volume  Orientation:  All  Mood:   Normal  Affect:   Appropriate   Thought Content: Clear   Insight:   Good         Plan/Need for Future Services:  Return for therapy in 2 weeks to treat diagnosed problems.    Patient has a current master individualized treatment plan.  See Epic treatment plan for more information.    Referral / Collaboration:  Referral to another professional/service is not indicated at this time..  Emergency Services Needed?  No    Assignment:  Continue self-care    Interactive Complexity:  There are four specific communication difficulties that complicate the work of the primary psychiatric procedure.  Interactive complexity (+01704) may be reported when at least one of these difficulties is present.    Communication difficulties present during current the psychiatric procedure include:  None.      Francisco Cannon, PhD, LP    [place] [facility], [facility]  Department of Family Medicine and Community Health  [facility] [employer]

## 2025-05-05 ENCOUNTER — VIRTUAL VISIT (OUTPATIENT)
Dept: PSYCHOLOGY | Facility: CLINIC | Age: 30
End: 2025-05-05
Payer: COMMERCIAL

## 2025-05-05 DIAGNOSIS — F41.1 GENERALIZED ANXIETY DISORDER: ICD-10-CM

## 2025-05-05 DIAGNOSIS — F43.10 POST-TRAUMATIC STRESS DISORDER: ICD-10-CM

## 2025-05-05 DIAGNOSIS — F15.20 SEVERE STIMULANT USE DISORDER (H): ICD-10-CM

## 2025-05-05 DIAGNOSIS — F33.1 MODERATE EPISODE OF RECURRENT MAJOR DEPRESSIVE DISORDER (H): Primary | ICD-10-CM

## 2025-05-05 NOTE — PROGRESS NOTES
Sexual and Gender Health Clinic - Progress Note    Date of Service: 25   Name: Sid Miranda  : 1995  Medical Record Number: [id number]  Treating Provider: Francisco Cannon, PhD  Type of Session: Individual  Present in Session: Client and therapist  Session Start and Stop Time: 10:04am-10:55am; client arrived a few minutes late due checking in  Number of Minutes:  51    SERVICE MODALITY:  Video Visit:      Provider verified identity through the following two step process.  Patient provided:  Patient is known previously to provider and Patient was verified at admission/transfer    Telemedicine Visit: The patient's condition can be safely assessed and treated via synchronous audio and visual telemedicine encounter.      Reason for Telemedicine Visit: Patient has requested telehealth visit    Originating Site (Patient Location): Patient's home    Distant Site (Provider Location): [facility]    Consent:  The patient/guardian has verbally consented to: the potential risks and benefits of telemedicine (video visit) versus in person care; bill my insurance or make self-payment for services provided; and responsibility for payment of non-covered services.     Patient would like the video invitation sent by:  My Chart    Mode of Communication:  Video Conference via [employer]    Distant Location (Provider):  On-site    As the provider I attest to compliance with applicable laws and regulations related to telemedicine.      DSM-5 Diagnoses:  Encounter Diagnoses   Name Primary?    Moderate episode of recurrent major depressive disorder (H) Yes    Generalized anxiety disorder     Post-traumatic stress disorder     Severe stimulant use disorder (H)      Current Reported Symptoms and Status update:  Changes since last session includes improvement in mood, anxiety, and trauma symptoms; decreased sexual interest; maintained sobriety.    Post Traumatic Stress Disorder: Client  reported a history of traumatic events meeting criteria for diagnosis. Client experiences recurrent memories, troubling dreams, physiological reactivity in response to memories/reminders of traumatic events, and trauma-related avoidance.    Generalized Anxiety Disorder: Excessive anxiety and worry about a number of events or activities (such as work or school performance); difficulty controlling worry; restlessness or feeling keyed up or on edge; difficulty concentrating or mind going blank; irritability.      Depression: Client reports hopelessness, depressed mood, irritability, anhedonia, low self-worth, isolation, fatigue, poor sleep, decreased functioning.      Severe Stimulant Use Disorder: Client has a history of methamphetamine use that significantly negatively impacts functioning, including financial stress, loss of employment, and loss of relationships. He continued to use despite negative consequences. Client has a history of sexual behavior he describes as compulsive when using meth. Further assessment needed to determine if compulsive sexual behaviors also occur separately from meth use.     Progress Toward Treatment Goals:   Satisfactory progress     Therapeutic Interventions/Treatment Strategies:    Area(s) of treatment focus addressed in this session included Symptom Management, Interpersonal Relationship Skills, and Sexual Health and Wellness    Client shared recent stressors with regard to work and the sociopolitical climate. He connected his work to his values and meaning/purpose. He reflected on having had few experiences of feeling fulfilled in work and this being one of them, and how he finds it important to help people. Client also shared positive experiences with his partner (e.g., client's birthday, exploring partner's work/education opportunities). He noted continued decreased intimacy and sexual connection with his partner. Client discussed feelings of low self-worth associated with  perceived limitations in exercise/gains. He also shared feelings of sexual aversion that contribute to decreased sexual interest.  We explored ways in which he is open to experience, challenging negative thinking, and testing counterfactuals. We planned to spend more time understanding his experience of sexual aversion.     Psychotherapist offered support, feedback and validation and reinforced use of skills Treatment modalities used include Cognitive Behavioral Therapy  Support and Feedback    Patient Response:   Patient responded to session by accepting feedback, giving feedback, listening, focusing on goals, and accepting support  Possible barriers to participation / learning include: N/A    Current Mental Status Exam:   Appearance:  Appropriate   Eye Contact:  Good   Attitude / Demeanor: Friendly  Speech      Rate / Production: Normal/ Responsive      Volume:  Normal  volume  Orientation:  All  Mood:   Normal  Affect:   Appropriate   Thought Content: Clear   Insight:   Good       Plan/Need for Future Services:  Return for therapy in 1 week to treat diagnosed problems.    Patient has a current master individualized treatment plan.  See Epic treatment plan for more information.    Referral / Collaboration:  Referral to another professional/service is not indicated at this time..  Emergency Services Needed?  No    Assignment:  Notice sensations related to current sexual interest, especially experienced physically.     Interactive Complexity:  There are four specific communication difficulties that complicate the work of the primary psychiatric procedure.  Interactive complexity (+56326) may be reported when at least one of these difficulties is present.    Communication difficulties present during current the psychiatric procedure include:  None.      Francisco Cannon, PhD, LP    [place] [facility], Sexual and Gender Health Clinic  Department of [facility]  [facility]

## 2025-05-12 ENCOUNTER — VIRTUAL VISIT (OUTPATIENT)
Dept: PSYCHOLOGY | Facility: CLINIC | Age: 30
End: 2025-05-12
Payer: COMMERCIAL

## 2025-05-12 DIAGNOSIS — F43.10 POST-TRAUMATIC STRESS DISORDER: ICD-10-CM

## 2025-05-12 DIAGNOSIS — F41.1 GENERALIZED ANXIETY DISORDER: ICD-10-CM

## 2025-05-12 DIAGNOSIS — F15.20 SEVERE STIMULANT USE DISORDER (H): ICD-10-CM

## 2025-05-12 DIAGNOSIS — F33.1 MODERATE EPISODE OF RECURRENT MAJOR DEPRESSIVE DISORDER (H): Primary | ICD-10-CM

## 2025-05-12 PROCEDURE — 90837 PSYTX W PT 60 MINUTES: CPT | Mod: 95 | Performed by: STUDENT IN AN ORGANIZED HEALTH CARE EDUCATION/TRAINING PROGRAM

## 2025-05-12 NOTE — NURSING NOTE
Current patient location: Patient declined to provide     Is the patient currently in the state of [place]? YES    Visit mode: VIDEO    If the visit is dropped, the patient can be reconnected by:VIDEO VISIT: Text to cell phone:   Telephone Information:   Mobile [phone]       Will anyone else be joining the visit? NO  (If patient encounters technical issues they should call [phone]    Are changes needed to the allergy or medication list? N/A    Are refills needed on medications prescribed by this physician? NO    Rooming Documentation:  Questionnaire(s) not done per department protocol    Reason for visit: RECHECK    Dhruv CARLTON

## 2025-05-12 NOTE — PROGRESS NOTES
Sexual and Gender Health Clinic - Progress Note    Date of Service: 25   Name: Sid Miranda  : 1995  Medical Record Number: [id number]  Treating Provider: Francisco Cannon, PhD  Type of Session: Individual  Present in Session: Client and therapist  Session Start and Stop Time: 10:03am-10:56am; session started a few minutes late due to technology difficulty  Number of Minutes:  53    SERVICE MODALITY:  Video Visit:      Provider verified identity through the following two step process.  Patient provided:  Patient is known previously to provider and Patient was verified at admission/transfer    Telemedicine Visit: The patient's condition can be safely assessed and treated via synchronous audio and visual telemedicine encounter.      Reason for Telemedicine Visit: Patient has requested telehealth visit    Originating Site (Patient Location): Patient's home    Distant Site (Provider Location): [facility] S[facility]UAL AND GENDER HE[facility]    Consent:  The patient/guardian has verbally consented to: the potential risks and benefits of telemedicine (video visit) versus in person care; bill my insurance or make self-payment for services provided; and responsibility for payment of non-covered services.     Patient would like the video invitation sent by:  My Chart    Mode of Communication:  Video Conference via [employer]    Distant Location (Provider):  On-site    As the provider I attest to compliance with applicable laws and regulations related to telemedicine.      DSM-5 Diagnoses:  Encounter Diagnoses   Name Primary?    Moderate episode of recurrent major depressive disorder (H) Yes    Generalized anxiety disorder     Post-traumatic stress disorder     Severe stimulant use disorder (H)          Current Reported Symptoms and Status update:  Changes since last session includes difficulty with sleep; improvement in depression and anxiety; improvement in trauma-related symptoms; maintained sobriety from  meth.    Post Traumatic Stress Disorder: Client reported a history of traumatic events meeting criteria for diagnosis. Client experiences recurrent memories, troubling dreams, physiological reactivity in response to memories/reminders of traumatic events, and trauma-related avoidance.    Generalized Anxiety Disorder: Excessive anxiety and worry about a number of events or activities (such as work or school performance); difficulty controlling worry; restlessness or feeling keyed up or on edge; difficulty concentrating or mind going blank; irritability.      Depression: Client reports hopelessness, depressed mood, irritability, anhedonia, low self-worth, isolation, fatigue, poor sleep, decreased functioning.      Severe Stimulant Use Disorder: Client has a history of methamphetamine use that significantly negatively impacts functioning, including financial stress, loss of employment, and loss of relationships. He continued to use despite negative consequences. Client has a history of sexual behavior he describes as compulsive when using meth. Further assessment needed to determine if compulsive sexual behaviors also occur separately from meth use.     Progress Toward Treatment Goals:   Satisfactory progress     Therapeutic Interventions/Treatment Strategies:    Area(s) of treatment focus addressed in this session included Symptom Management, Interpersonal Relationship Skills, and Sexual Health and Wellness    Client shared that he is doing well overall and continuing to maintain sobriety. He shared sleep-related challenges, including frequent dreams that wake him up. He discussed themes of tension and sexuality in his dreams. We explored current feelings about sexuality given experience of sexual anhedonia the last few months. He shared feeling disinterested in sex but finding some aspects (e.g., visual) appealing. We explored current sexual functioning and reactions (e.g., in the past thinking there was something  wrong with him and exploring if that is coming up in the present). Client noted current functioning/interest not aligning with his values or desires. We explored his ideal sexual self, including being more intimate and expressive in his current relationship and potential for flexibility in relationship agreements. We processed feelings of guilt related to current relationship and sexual connection. We explore biopsychosocial factors contributing to sexual desire and responsiveness.     Psychotherapist offered support, feedback and validation and reinforced use of skills Treatment modalities used include Cognitive Behavioral Therapy Interpersonal  Support and Feedback    Patient Response:   Patient responded to session by accepting feedback, giving feedback, listening, focusing on goals, and accepting support  Possible barriers to participation / learning include: N/A    Current Mental Status Exam:   Appearance:  Appropriate   Eye Contact:  Good   Attitude / Demeanor: Friendly  Speech      Rate / Production: Normal/ Responsive      Volume:  Normal  volume  Orientation:  All  Mood:   Normal  Affect:   Appropriate   Thought Content: Clear   Insight:   Good       Plan/Need for Future Services:  Return for therapy in 1-2 weeks to treat diagnosed problems.    Patient has a current master individualized treatment plan.  See Epic treatment plan for more information.    Referral / Collaboration:  Referral to another professional/service is not indicated at this time..  Emergency Services Needed?  No    Assignment:  Future - explore ideal sexual self    Interactive Complexity:  There are four specific communication difficulties that complicate the work of the primary psychiatric procedure.  Interactive complexity (+18093) may be reported when at least one of these difficulties is present.    Communication difficulties present during current the psychiatric procedure include:  None.      Signature/Title:      Francisco Cannon,  PhD, LP    [place] [facility], Sexual and Gender Health Clinic  Department of Family Medicine and Community Health  [facility]

## 2025-05-14 ENCOUNTER — MYC REFILL (OUTPATIENT)
Dept: PSYCHIATRY | Facility: CLINIC | Age: 30
End: 2025-05-14
Payer: COMMERCIAL

## 2025-05-14 DIAGNOSIS — F32.A DEPRESSION, UNSPECIFIED DEPRESSION TYPE: ICD-10-CM

## 2025-05-14 DIAGNOSIS — F43.10 POST-TRAUMATIC STRESS DISORDER: ICD-10-CM

## 2025-05-14 DIAGNOSIS — F41.1 GENERALIZED ANXIETY DISORDER: ICD-10-CM

## 2025-05-14 NOTE — CONFIDENTIAL NOTE
Last seen: 4/17/2025  RTC: 1 mo  Any patient initiated cancellations or no shows since last visit? No  Next appt:   Last filled:      Incoming refill from patient via phone by patient or caregiver    Medication requested:   Pending Prescriptions:                       Disp   Refills    escitalopram (LEXAPRO) 5 MG tablet        30 tab*0            Sig: Take 1 tablet (5 mg) by mouth daily.    escitalopram (LEXAPRO) 10 MG tablet       30 tab*0            Sig: Take 1 tablet (10 mg) by mouth daily. In addition           to other escitalopram prescription totaling 15mg           in the morning.    From chart note:     1) Medications:   - CONTINUE prazosin 1mg at bedtime (take this if nightmares return).   - Continue lexapro 15mg.   - Continue naltrexone 50mg  -Continue:  wellbutrin XL 150mg.      2) Psychotherapy: continue     3) Next due:  Labs- Routine monitoring is not indicated for current psychotropic medication regimen   EKG- Routine monitoring is not indicated for current psychotropic medication regimen   Rating scales- none needed     4) Referrals: none     5) Other: none     6) Follow-up: Return to clinic in 4 weeks.     Is note signed/closed? Yes    Is this a 90 day request for a psych medication? No    LPNs - Please check  if controlled and send to provider  Others - Send to RNs     Bethany Virk CMA

## 2025-05-15 RX ORDER — ESCITALOPRAM OXALATE 10 MG/1
10 TABLET ORAL DAILY
Qty: 30 TABLET | Refills: 0 | Status: SHIPPED | OUTPATIENT
Start: 2025-05-15

## 2025-05-15 RX ORDER — ESCITALOPRAM OXALATE 5 MG/1
5 TABLET ORAL DAILY
Qty: 30 TABLET | Refills: 0 | Status: SHIPPED | OUTPATIENT
Start: 2025-05-15

## 2025-05-19 ENCOUNTER — VIRTUAL VISIT (OUTPATIENT)
Dept: PSYCHOLOGY | Facility: CLINIC | Age: 30
End: 2025-05-19
Payer: COMMERCIAL

## 2025-05-19 DIAGNOSIS — F15.20 SEVERE STIMULANT USE DISORDER (H): ICD-10-CM

## 2025-05-19 DIAGNOSIS — F41.1 GENERALIZED ANXIETY DISORDER: ICD-10-CM

## 2025-05-19 DIAGNOSIS — F33.1 MODERATE EPISODE OF RECURRENT MAJOR DEPRESSIVE DISORDER (H): Primary | ICD-10-CM

## 2025-05-19 DIAGNOSIS — F43.10 POST-TRAUMATIC STRESS DISORDER: ICD-10-CM

## 2025-05-19 NOTE — NURSING NOTE
Current patient location: [address] [address]    Is the patient currently in the state of [place]? YES    Visit mode: VIDEO    If the visit is dropped, the patient can be reconnected by:VIDEO VISIT: Text to cell phone:   Telephone Information:   Mobile [phone]       Will anyone else be joining the visit? NO  (If patient encounters technical issues they should call [phone]    Are changes needed to the allergy or medication list? N/A    Are refills needed on medications prescribed by this physician? NO    Rooming Documentation:  Not applicable    Reason for visit: RECHFARA CARLTON

## 2025-05-19 NOTE — PROGRESS NOTES
[facility] - Progress Note    Date of Service: 25   Name: Sid Miranda  : 1995  Medical Record Number: [id number]  Treating Provider: Francisco Cannon, PhD  Type of Session: Individual  Present in Session: Client and therapist  Session Start and Stop Time: 10:04am-10:53am  Number of Minutes:  49    SERVICE MODALITY:  Video Visit:      Provider verified identity through the following two step process.  Patient provided:  Patient is known previously to provider and Patient was verified at admission/transfer    Telemedicine Visit: The patient's condition can be safely assessed and treated via synchronous audio and visual telemedicine encounter.      Reason for Telemedicine Visit: Patient has requested telehealth visit    Originating Site (Patient Location): Patient's home    Distant Site (Provider Location): [facility]    Consent:  The patient/guardian has verbally consented to: the potential risks and benefits of telemedicine (video visit) versus in person care; bill my insurance or make self-payment for services provided; and responsibility for payment of non-covered services.     Patient would like the video invitation sent by:  My Chart    Mode of Communication:  [facility]    Distant Location (Provider):  On-site    As the provider I attest to compliance with applicable laws and regulations related to telemedicine.      DSM-5 Diagnoses:  Encounter Diagnoses   Name Primary?    Moderate episode of recurrent major depressive disorder (H) Yes    Generalized anxiety disorder     Severe stimulant use disorder (H)     Post-traumatic stress disorder          Current Reported Symptoms and Status update:  Changes since last session includes improvement in depression and anxiety, recent activation related to trauma and stimulant use, ongoing concerns with sleep and sexual desire.    Post Traumatic Stress Disorder: Client reported a  history of traumatic events meeting criteria for diagnosis. Client experiences recurrent memories, troubling dreams, physiological reactivity in response to memories/reminders of traumatic events, and trauma-related avoidance.    Generalized Anxiety Disorder: Excessive anxiety and worry about a number of events or activities (such as work or school performance); difficulty controlling worry; restlessness or feeling keyed up or on edge; difficulty concentrating or mind going blank; irritability.      Depression: Client reports hopelessness, depressed mood, irritability, anhedonia, low self-worth, isolation, fatigue, poor sleep, decreased functioning.      Severe Stimulant Use Disorder: Client has a history of methamphetamine use that significantly negatively impacts functioning, including financial stress, loss of employment, and loss of relationships. He continued to use despite negative consequences. Client has a history of sexual behavior he describes as compulsive when using meth. Further assessment needed to determine if compulsive sexual behaviors also occur separately from meth use.     Progress Toward Treatment Goals:   Satisfactory progress     Therapeutic Interventions/Treatment Strategies:    Area(s) of treatment focus addressed in this session included Symptom Management, Interpersonal Relationship Skills, and Sexual Health and Wellness    Client processed recent activation he experienced related to past substance use that arose in the context of sponsoring someone with similar past experiences. He shared how he noticed the activation and responded effectively to it. Therapist provided validation and support. We highlighted his own recovery process and how he is integrating effective coping and self-awareness in his work. Client reflected on last week's assignment of contemplating his ideal sexual self. He shared primarily emotional/affective ways of being a sexual person: reduced shame and pressure. We  discussed ways of continuing to work on a more neutral-to-positive sense of self. Assigned client Francesca Luz     Psychotherapist offered support, feedback and validation and reinforced use of skills Treatment modalities used include Cognitive Behavioral Therapy  Support and Feedback    Patient Response:   Patient responded to session by accepting feedback, giving feedback, listening, focusing on goals, and accepting support  Possible barriers to participation / learning include: N/A    Current Mental Status Exam:   Appearance:  Appropriate   Eye Contact:  Good   Attitude / Demeanor: Friendly  Speech      Rate / Production: Normal/ Responsive      Volume:  Normal  volume  Orientation:  All  Mood:   Normal  Affect:   Appropriate   Thought Content: Clear   Insight:   Good         Plan/Need for Future Services:  Return for therapy in 1-2 weeks to treat diagnosed problems.    Patient has a current master individualized treatment plan.  See Epic treatment plan for more information.    Referral / Collaboration:  Referral to another professional/service is not indicated at this time..  Emergency Services Needed?  No    Assignment:  Francesca Luz on self-worth and vulnerability    Interactive Complexity:  There are four specific communication difficulties that complicate the work of the primary psychiatric procedure.  Interactive complexity (+49887) may be reported when at least one of these difficulties is present.    Communication difficulties present during current the psychiatric procedure include:  None.      Francisco Cannon, PhD, LP    [place] [facility], [facility] and [facility] Health Clinic  Department of Family Medicine and Community Health  [employer] [facility] [employer]

## 2025-06-02 ENCOUNTER — VIRTUAL VISIT (OUTPATIENT)
Dept: PSYCHOLOGY | Facility: CLINIC | Age: 30
End: 2025-06-02
Payer: COMMERCIAL

## 2025-06-02 DIAGNOSIS — F17.200 NICOTINE USE DISORDER: ICD-10-CM

## 2025-06-02 DIAGNOSIS — F41.1 GENERALIZED ANXIETY DISORDER: Primary | ICD-10-CM

## 2025-06-02 DIAGNOSIS — F43.10 POST-TRAUMATIC STRESS DISORDER: ICD-10-CM

## 2025-06-02 DIAGNOSIS — F33.1 MODERATE EPISODE OF RECURRENT MAJOR DEPRESSIVE DISORDER (H): ICD-10-CM

## 2025-06-02 DIAGNOSIS — F15.20 SEVERE STIMULANT USE DISORDER (H): ICD-10-CM

## 2025-06-02 NOTE — PROGRESS NOTES
Sexual and Gender Health Clinic - Progress Note    Date of Service: 25   Name: Sid Miranda  : 1995  Medical Record Number: [id number]  Treating Provider: Francisco Cannon, PhD  Type of Session: Individual  Present in Session: Client and therapist  Session Start and Stop Time: 10:06-10:58am  Number of Minutes:  52    SERVICE MODALITY:  Video Visit:      Provider verified identity through the following two step process.  Patient provided:  Patient is known previously to provider and Patient was verified at admission/transfer    Telemedicine Visit: The patient's condition can be safely assessed and treated via synchronous audio and visual telemedicine encounter.      Reason for Telemedicine Visit: Patient has requested telehealth visit    Originating Site (Patient Location): Patient's home    Distant Site (Provider Location): [facility]    Consent:  The patient/guardian has verbally consented to: the potential risks and benefits of telemedicine (video visit) versus in person care; bill my insurance or make self-payment for services provided; and responsibility for payment of non-covered services.     Patient would like the video invitation sent by:  My Chart    Mode of Communication:  [facility]    Distant Location (Provider):  On-site    As the provider I attest to compliance with applicable laws and regulations related to telemedicine.      DSM-5 Diagnoses:  Encounter Diagnoses   Name Primary?    Generalized anxiety disorder Yes    Post-traumatic stress disorder     Severe stimulant use disorder (H)     Moderate episode of recurrent major depressive disorder (H)     Nicotine use disorder          Current Reported Symptoms and Status update:  Changes since last session includes some anxiety and stress, difficulty maintaining sleep, contemplating stopping nicotine use (currently using more than desired), maintained sobriety from stimulants, some  boredom and anhedonia.    Post Traumatic Stress Disorder: Client reported a history of traumatic events meeting criteria for diagnosis. Client experiences recurrent memories, troubling dreams, physiological reactivity in response to memories/reminders of traumatic events, and trauma-related avoidance.    Generalized Anxiety Disorder: Excessive anxiety and worry about a number of events or activities (such as work or school performance); difficulty controlling worry; restlessness or feeling keyed up or on edge; difficulty concentrating or mind going blank; irritability.      Depression: Client reports hopelessness, depressed mood, irritability, anhedonia, low self-worth, isolation, fatigue, poor sleep, decreased functioning.      Severe Stimulant Use Disorder: Client has a history of methamphetamine use that significantly negatively impacts functioning, including financial stress, loss of employment, and loss of relationships. He continued to use despite negative consequences.     Nicotine Use Disorder: Uses vape more than desired, considering reducing/stopping nicotine use.    Progress Toward Treatment Goals:   Satisfactory progress     Therapeutic Interventions/Treatment Strategies:    Area(s) of treatment focus addressed in this session included Symptom Management and [place] Maintenance/Relapse Prevention    Client shared stressors and anxiety, and concerns related to sleep and anhedonia. He noted a tendency to overuse nicotine and increase in caffeine. We explored his thoughts related to reducing or stopping nicotine (vape). Client reflected on being in pre-contemplation/contemplation. We explored his motivations for change (long-term health, sleep, values, cost) and potential concerns with change (upcoming events, not wanting to destabilize current balance). We discussed trying a mindful approach to nicotine use based on mindfulness-based approaches to nicotine cessation. Client shared work stressors and  interactions with neighbors. Client also shared updates in his relationship and ways in which he notices and experiences his partner's anxiety (and how he supports regulation).     Psychotherapist offered support, feedback and validation and reinforced use of skills Treatment modalities used include Cognitive Behavioral Therapy  Support and Feedback    Patient Response:   Patient responded to session by accepting feedback, giving feedback, listening, focusing on goals, and accepting support  Possible barriers to participation / learning include: N/A    Current Mental Status Exam:   Appearance:  Appropriate   Eye Contact:  Good   Attitude / Demeanor: Friendly  Speech      Rate / Production: Normal/ Responsive      Volume:  Normal  volume  Orientation:  All  Mood:   Normal  Affect:   Appropriate   Thought Content: Clear   Insight:   Good     Plan/Need for Future Services:  Return for therapy in 1-2 weeks to treat diagnosed problems.    Patient has a current master individualized treatment plan.  See Epic treatment plan for more information.    Referral / Collaboration:  Was/were discussed and patient will pursue.  Emergency Services Needed?  No    Assignment:  Schedule with Neftali West to discuss nicotine cessation options    Interactive Complexity:  There are four specific communication difficulties that complicate the work of the primary psychiatric procedure.  Interactive complexity (+66771) may be reported when at least one of these difficulties is present.    Communication difficulties present during current the psychiatric procedure include:  None.      Francisco Cannon, PhD, LP    [place] [facility] Sexual [facility], Sexual and Gender Health Clinic  Department of Family Medicine and Community Health  [employer] [facility] [employer]

## 2025-06-02 NOTE — NURSING NOTE
Current patient location: [address] [address]    Is the patient currently in the state of [place]? YES    Visit mode: VIDEO    If the visit is dropped, the patient can be reconnected by:VIDEO VISIT: Text to cell phone:   Telephone Information:   Mobile [phone]    and VIDEO VISIT: Send to e-mail at: [email or web address]    Will anyone else be joining the visit? NO  (If patient encounters technical issues they should call [phone]    Are changes needed to the allergy or medication list? No    Are refills needed on medications prescribed by this physician? NO    Rooming Documentation:  Questionnaire(s) completed    Reason for visit: RECHECK    Mildred CARLTON

## 2025-06-23 ENCOUNTER — VIRTUAL VISIT (OUTPATIENT)
Dept: PSYCHOLOGY | Facility: CLINIC | Age: 30
End: 2025-06-23
Payer: COMMERCIAL

## 2025-06-23 DIAGNOSIS — F17.200 NICOTINE USE DISORDER: ICD-10-CM

## 2025-06-23 DIAGNOSIS — F43.10 POST-TRAUMATIC STRESS DISORDER: ICD-10-CM

## 2025-06-23 DIAGNOSIS — F15.20 SEVERE STIMULANT USE DISORDER (H): ICD-10-CM

## 2025-06-23 DIAGNOSIS — F41.1 GENERALIZED ANXIETY DISORDER: ICD-10-CM

## 2025-06-23 DIAGNOSIS — F33.1 MODERATE EPISODE OF RECURRENT MAJOR DEPRESSIVE DISORDER (H): Primary | ICD-10-CM

## 2025-06-23 PROCEDURE — 90837 PSYTX W PT 60 MINUTES: CPT | Mod: 95 | Performed by: STUDENT IN AN ORGANIZED HEALTH CARE EDUCATION/TRAINING PROGRAM

## 2025-06-23 NOTE — PROGRESS NOTES
Sexual and Gender Health Clinic - Progress Note    Date of Service: 25   Name: Sid Miranda  : 1995  Medical Record Number: [id number]  Treating Provider: Francisco Cannon, PhD  Type of Session: Individual  Present in Session: Client and therapist  Session Start and Stop Time: 4:02-4:58pm  Number of Minutes:  56    SERVICE MODALITY:  Video Visit:      Provider verified identity through the following two step process.  Patient provided:  Patient is known previously to provider and Patient was verified at admission/transfer    Telemedicine Visit: The patient's condition can be safely assessed and treated via synchronous audio and visual telemedicine encounter.      Reason for Telemedicine Visit: Patient has requested telehealth visit    Originating Site (Patient Location): Patient's home    Distant Site (Provider Location): [facility]    Consent:  The patient/guardian has verbally consented to: the potential risks and benefits of telemedicine (video visit) versus in person care; bill my insurance or make self-payment for services provided; and responsibility for payment of non-covered services.     Patient would like the video invitation sent by:  My Chart    Mode of Communication:  [facility]    Distant Location (Provider):  On-site    As the provider I attest to compliance with applicable laws and regulations related to telemedicine.      DSM-5 Diagnoses:  Encounter Diagnoses   Name Primary?    Generalized anxiety disorder     Post-traumatic stress disorder     Moderate episode of recurrent major depressive disorder (H) Yes    Severe stimulant use disorder (H) in early remission     Nicotine use disorder          Current Reported Symptoms and Status update:  Changes since last session includes excitement for upcoming family vacation, feeling steady/stable, no significant changes in mental health concerns, plans for upcoming psychiatry appt to  discuss nicotine cessation, improvement in anxiety and depression, improvement in PTSD, maintained sobriety from stimulants, exploring motivations and resources for stopping nicotine.     Post Traumatic Stress Disorder: Client reported a history of traumatic events meeting criteria for diagnosis. Client experiences recurrent memories, troubling dreams, physiological reactivity in response to memories/reminders of traumatic events, and trauma-related avoidance.    Generalized Anxiety Disorder: Excessive anxiety and worry about a number of events or activities (such as work or school performance); difficulty controlling worry; restlessness or feeling keyed up or on edge; difficulty concentrating or mind going blank; irritability.      Depression: Client reports hopelessness, depressed mood, irritability, anhedonia, low self-worth, isolation, fatigue, poor sleep, decreased functioning.      Severe Stimulant Use Disorder: Client has a history of methamphetamine use that significantly negatively impacts functioning, including financial stress, loss of employment, and loss of relationships. He continued to use despite negative consequences. Sober from 9 months.    Nicotine Use Disorder: Uses vape more than desired, considering reducing/stopping nicotine use.    Progress Toward Treatment Goals:   Satisfactory progress     Therapeutic Interventions/Treatment Strategies:    Area(s) of treatment focus addressed in this session included Symptom Management and [place] Maintenance/Relapse Prevention    Client shared that he is having increased fatigue and difficulty staying asleep at night, leading to him napping during the day. He noted that he does not notice much disruption due to napping but has concerns about night-time sleep quality. He explored motivations to quit nicotine (vaping) and has an appt with his psychiatry provider Neftali West PA-C, to discuss options for nicotine cessation. He described feeling mostly  steady and at-times flat or restless. He reflected on living with consistency and has satisfaction. Client reflected on his current steadiness and future. He described having thoughts about his future career, etc, and some uncertainty about what to do next. He noted that he missed a couple doses of medication lately and experienced increased discontentment with regard to life circumstances. He pondered the effects of medications and his true feelings. Client processed feeling weird or violated in response to a person sending him an unsolicited sexual image.     Psychotherapist offered support, feedback and validation and reinforced use of skills Treatment modalities used include Cognitive Behavioral Therapy  Support and Feedback    Patient Response:   Patient responded to session by accepting feedback, giving feedback, listening, focusing on goals, and accepting support  Possible barriers to participation / learning include: N/A    Current Mental Status Exam:   Appearance:  Appropriate   Eye Contact:  Good   Attitude / Demeanor: Friendly  Speech      Rate / Production: Normal/ Responsive      Volume:  Normal  volume  Orientation:  All  Mood:   Normal  Affect:   Appropriate   Thought Content: Clear   Insight:   Good         Plan/Need for Future Services:  Return for therapy in 1-2 weeks to treat diagnosed problems.    Patient has a current master individualized treatment plan.  See Epic treatment plan for more information.    Referral / Collaboration:  Referral to another professional/service is not indicated at this time..  Emergency Services Needed?  No    Assignment:  Continue self-care    Interactive Complexity:  There are four specific communication difficulties that complicate the work of the primary psychiatric procedure.  Interactive complexity (+46218) may be reported when at least one of these difficulties is present.    Communication difficulties present during current the psychiatric procedure  include:  None.        Francisco Cannon, PhD, LP    [place] [facility] Sexual and Gender [facility], Sexual and Gender Health Clinic  Department of Family Medicine and Community Health  [facility]

## 2025-06-23 NOTE — NURSING NOTE
Is the patient currently in the state of [place]? YES    Current patient location: [address] [address]    Visit mode:Video    If the visit is dropped, the patient can be reconnected by: VIDEO VISIT: Text to cell phone:   Telephone Information:   Mobile [phone]       Will anyone else be joining the visit? No  (If patient encounters technical issues they should call [phone])    Are changes needed to the allergy or medication list? N/A    Are refills needed on medications prescribed by this physician? No    Rooming Documentation: Questionnaire(s) not done per department protocol.    Reason for visit: RECHECK     BIANKA Delgado

## 2025-06-30 ENCOUNTER — MYC REFILL (OUTPATIENT)
Dept: PSYCHIATRY | Facility: CLINIC | Age: 30
End: 2025-06-30
Payer: COMMERCIAL

## 2025-06-30 DIAGNOSIS — F41.1 GENERALIZED ANXIETY DISORDER: ICD-10-CM

## 2025-06-30 DIAGNOSIS — F32.A DEPRESSION, UNSPECIFIED DEPRESSION TYPE: ICD-10-CM

## 2025-06-30 DIAGNOSIS — F43.10 POST-TRAUMATIC STRESS DISORDER: ICD-10-CM

## 2025-06-30 RX ORDER — ESCITALOPRAM OXALATE 5 MG/1
5 TABLET ORAL DAILY
Qty: 30 TABLET | Refills: 0 | Status: SHIPPED | OUTPATIENT
Start: 2025-06-30

## 2025-06-30 RX ORDER — ESCITALOPRAM OXALATE 10 MG/1
10 TABLET ORAL DAILY
Qty: 30 TABLET | Refills: 0 | Status: SHIPPED | OUTPATIENT
Start: 2025-06-30

## 2025-07-28 ENCOUNTER — VIRTUAL VISIT (OUTPATIENT)
Dept: PSYCHOLOGY | Facility: CLINIC | Age: 30
End: 2025-07-28
Payer: COMMERCIAL

## 2025-07-28 DIAGNOSIS — F41.1 GENERALIZED ANXIETY DISORDER: ICD-10-CM

## 2025-07-28 DIAGNOSIS — F15.20 SEVERE STIMULANT USE DISORDER (H): ICD-10-CM

## 2025-07-28 DIAGNOSIS — F33.1 MODERATE EPISODE OF RECURRENT MAJOR DEPRESSIVE DISORDER (H): Primary | ICD-10-CM

## 2025-07-28 DIAGNOSIS — F43.10 POST-TRAUMATIC STRESS DISORDER: ICD-10-CM

## 2025-07-28 DIAGNOSIS — F17.200 NICOTINE USE DISORDER: ICD-10-CM

## 2025-07-28 PROCEDURE — 90837 PSYTX W PT 60 MINUTES: CPT | Mod: 95 | Performed by: STUDENT IN AN ORGANIZED HEALTH CARE EDUCATION/TRAINING PROGRAM

## 2025-07-28 NOTE — NURSING NOTE
Is the patient currently in the state of [place]? YES    Current patient location: [address] [address]    Visit mode:Video    If the visit is dropped, the patient can be reconnected by: VIDEO VISIT: Text to cell phone:   Telephone Information:   Mobile [phone]       Will anyone else be joining the visit? No  (If patient encounters technical issues they should call [phone])    Are changes needed to the allergy or medication list? N/A    Are refills needed on medications prescribed by this physician? No    Rooming Documentation: Questionnaire(s) not done per department protocol.    Reason for visit: RECHECK     BIANKA Delgado

## 2025-07-28 NOTE — PROGRESS NOTES
[facility] - Progress Note    Date of Service: 25   Name: Sid Miranda  : 1995  Medical Record Number: [id number]  Treating Provider: Francisco Cannon, PhD  Type of Session: Individual  Present in Session: Client and therapist  Session Start and Stop Time: 9:02am-9:55am  Number of Minutes:  53    SERVICE MODALITY:  Video Visit:      Provider verified identity through the following two step process.  Patient provided:  Patient is known previously to provider and Patient was verified at admission/transfer    Telemedicine Visit: The patient's condition can be safely assessed and treated via synchronous audio and visual telemedicine encounter.      Reason for Telemedicine Visit: Patient has requested telehealth visit    Originating Site (Patient Location): Patient's home    Distant Site (Provider Location): [facility]    Consent:  The patient/guardian has verbally consented to: the potential risks and benefits of telemedicine (video visit) versus in person care; bill my insurance or make self-payment for services provided; and responsibility for payment of non-covered services.     Patient would like the video invitation sent by:  My Chart    Mode of Communication:  [facility]    Distant Location (Provider):  On-site    As the provider I attest to compliance with applicable laws and regulations related to telemedicine.      DSM-5 Diagnoses:  Encounter Diagnoses   Name Primary?    Moderate episode of recurrent major depressive disorder (H) Yes    Generalized anxiety disorder     Severe stimulant use disorder (H)     Post-traumatic stress disorder     Nicotine use disorder          Current Reported Symptoms and Status update:  Changes since last session includes recently quitting nicotine and started Chantix, improvement in depression and anxiety, improvement in trauma symptoms, maintained sobriety from stimulants.     Progress  Toward Treatment Goals:   Satisfactory progress     Post Traumatic Stress Disorder: Client reported a history of traumatic events meeting criteria for diagnosis. Client experiences recurrent memories, troubling dreams, physiological reactivity in response to memories/reminders of traumatic events, and trauma-related avoidance.    Generalized Anxiety Disorder: Excessive anxiety and worry about a number of events or activities (such as work or school performance); difficulty controlling worry; restlessness or feeling keyed up or on edge; difficulty concentrating or mind going blank; irritability.      Depression: Client reports hopelessness, depressed mood, irritability, anhedonia, low self-worth, isolation, fatigue, poor sleep, decreased functioning.      Severe Stimulant Use Disorder: Client has a history of methamphetamine use that significantly negatively impacts functioning, including financial stress, loss of employment, and loss of relationships. He continued to use despite negative consequences. Sober from 9 months.    Nicotine Use Disorder: Uses vape more than desired, considering reducing/stopping nicotine use.    Therapeutic Interventions/Treatment Strategies:    Area(s) of treatment focus addressed in this session included Symptom Management, Interpersonal Relationship Skills, and Sexual Health and Wellness    Client shared that he is overall doing well. He processed emotions related to his relationship and decreased sexual interest. He discussed wanting to spend more time exploring intimacy with his partner in ways that do not violate his boundaries. We explored his boundaries and desire, and ways in which he can lean into connection with his partner. Client reflected on his and his partner's connection, pressure he feels in being physical with his partner, and feeling guilt. Client also reflected on acceptance related to lower desire and decreased interest in sex within the relationship. He shared that  he recently started Chantix and stopped Nicotine use this past Saturday. He reported increased irritability but otherwise said he is feeling good and focusing on more global health/wellbeing.    Psychotherapist offered support, feedback and validation and reinforced use of skills Treatment modalities used include Cognitive Behavioral Therapy  Support and Feedback    Patient Response:   Patient responded to session by accepting feedback, giving feedback, listening, focusing on goals, and accepting support  Possible barriers to participation / learning include: N/A    Current Mental Status Exam:   Appearance:  Appropriate   Eye Contact:  Good   Attitude / Demeanor: Friendly  Speech      Rate / Production: Normal/ Responsive      Volume:  Normal  volume  Orientation:  All  Mood:   Normal  Affect:   Appropriate   Thought Content: Clear   Insight:   Good         Plan/Need for Future Services:  Return for therapy in 1-2 weeks to treat diagnosed problems.    Patient has a current master individualized treatment plan.  See Epic treatment plan for more information.    Referral / Collaboration:  Referral to another professional/service is not indicated at this time..  Emergency Services Needed?  No    Assignment:  Explore positive sexual experiences (and client's qualifiers in understanding them) to better understand current reactions to sex/sexuality    Interactive Complexity:  There are four specific communication difficulties that complicate the work of the primary psychiatric procedure.  Interactive complexity (+[id number]) may be reported when at least one of these difficulties is present.    Communication difficulties present during current the psychiatric procedure include:  None.      Francisco Cannon, PhD, LP    [facility], [facility]  Department of Family Medicine and Community Health  [employer]

## 2025-07-29 ENCOUNTER — MYC REFILL (OUTPATIENT)
Dept: PSYCHIATRY | Facility: CLINIC | Age: 30
End: 2025-07-29
Payer: COMMERCIAL

## 2025-07-29 DIAGNOSIS — F43.10 POST-TRAUMATIC STRESS DISORDER: ICD-10-CM

## 2025-07-29 DIAGNOSIS — F32.A DEPRESSION, UNSPECIFIED DEPRESSION TYPE: ICD-10-CM

## 2025-07-29 DIAGNOSIS — F41.1 GENERALIZED ANXIETY DISORDER: ICD-10-CM

## 2025-07-30 RX ORDER — ESCITALOPRAM OXALATE 5 MG/1
5 TABLET ORAL DAILY
Qty: 30 TABLET | Refills: 0 | Status: SHIPPED | OUTPATIENT
Start: 2025-07-30

## 2025-07-30 RX ORDER — ESCITALOPRAM OXALATE 10 MG/1
10 TABLET ORAL DAILY
Qty: 30 TABLET | Refills: 0 | Status: SHIPPED | OUTPATIENT
Start: 2025-07-30

## 2025-08-01 ENCOUNTER — MYC MEDICAL ADVICE (OUTPATIENT)
Dept: PSYCHIATRY | Facility: CLINIC | Age: 30
End: 2025-08-01
Payer: COMMERCIAL

## 2025-08-01 DIAGNOSIS — F17.200 NICOTINE USE DISORDER: Primary | ICD-10-CM

## 2025-08-04 ENCOUNTER — VIRTUAL VISIT (OUTPATIENT)
Dept: PSYCHOLOGY | Facility: CLINIC | Age: 30
End: 2025-08-04
Payer: COMMERCIAL

## 2025-08-04 DIAGNOSIS — F41.1 GENERALIZED ANXIETY DISORDER: ICD-10-CM

## 2025-08-04 DIAGNOSIS — F17.200 NICOTINE USE DISORDER: ICD-10-CM

## 2025-08-04 DIAGNOSIS — F33.42 RECURRENT MAJOR DEPRESSIVE DISORDER, IN FULL REMISSION: Primary | ICD-10-CM

## 2025-08-04 DIAGNOSIS — F15.20 SEVERE STIMULANT USE DISORDER (H): ICD-10-CM

## 2025-08-04 DIAGNOSIS — F43.10 POST-TRAUMATIC STRESS DISORDER: ICD-10-CM

## 2025-08-04 PROCEDURE — 90837 PSYTX W PT 60 MINUTES: CPT | Mod: 95 | Performed by: STUDENT IN AN ORGANIZED HEALTH CARE EDUCATION/TRAINING PROGRAM

## 2025-08-05 ENCOUNTER — MYC REFILL (OUTPATIENT)
Dept: PSYCHIATRY | Facility: CLINIC | Age: 30
End: 2025-08-05
Payer: COMMERCIAL

## 2025-08-05 DIAGNOSIS — F15.20 SEVERE STIMULANT USE DISORDER (H): ICD-10-CM

## 2025-08-05 DIAGNOSIS — F17.200 NICOTINE USE DISORDER: ICD-10-CM

## 2025-08-05 RX ORDER — VARENICLINE TARTRATE 0.5 (11)-1
KIT ORAL
Qty: 53 TABLET | Refills: 0 | Status: CANCELLED | OUTPATIENT
Start: 2025-08-05

## 2025-08-05 RX ORDER — NALTREXONE HYDROCHLORIDE 50 MG/1
50 TABLET, FILM COATED ORAL DAILY
Qty: 30 TABLET | Refills: 0 | Status: SHIPPED | OUTPATIENT
Start: 2025-08-05

## 2025-08-05 RX ORDER — VARENICLINE TARTRATE 1 MG/1
1 TABLET, FILM COATED ORAL 2 TIMES DAILY
Qty: 180 TABLET | Refills: 0 | Status: SHIPPED | OUTPATIENT
Start: 2025-08-05

## 2025-08-25 ENCOUNTER — VIRTUAL VISIT (OUTPATIENT)
Dept: PSYCHOLOGY | Facility: CLINIC | Age: 30
End: 2025-08-25
Payer: COMMERCIAL

## 2025-08-25 DIAGNOSIS — F33.42 RECURRENT MAJOR DEPRESSIVE DISORDER, IN FULL REMISSION: ICD-10-CM

## 2025-08-25 DIAGNOSIS — F43.10 POST-TRAUMATIC STRESS DISORDER: ICD-10-CM

## 2025-08-25 DIAGNOSIS — F15.20 SEVERE STIMULANT USE DISORDER (H): ICD-10-CM

## 2025-08-25 DIAGNOSIS — F41.1 GENERALIZED ANXIETY DISORDER: Primary | ICD-10-CM

## 2025-08-25 DIAGNOSIS — F17.200 NICOTINE USE DISORDER: ICD-10-CM

## 2025-08-25 PROCEDURE — 90834 PSYTX W PT 45 MINUTES: CPT | Mod: 95 | Performed by: STUDENT IN AN ORGANIZED HEALTH CARE EDUCATION/TRAINING PROGRAM

## 2025-09-01 ENCOUNTER — MYC REFILL (OUTPATIENT)
Dept: PSYCHIATRY | Facility: CLINIC | Age: 30
End: 2025-09-01
Payer: COMMERCIAL

## 2025-09-01 DIAGNOSIS — F41.1 GENERALIZED ANXIETY DISORDER: ICD-10-CM

## 2025-09-01 DIAGNOSIS — F32.A DEPRESSION, UNSPECIFIED DEPRESSION TYPE: ICD-10-CM

## 2025-09-01 DIAGNOSIS — F43.10 POST-TRAUMATIC STRESS DISORDER: ICD-10-CM

## 2025-09-02 RX ORDER — ESCITALOPRAM OXALATE 5 MG/1
5 TABLET ORAL DAILY
Qty: 30 TABLET | Refills: 0 | Status: SHIPPED | OUTPATIENT
Start: 2025-09-02

## 2025-09-02 RX ORDER — ESCITALOPRAM OXALATE 10 MG/1
10 TABLET ORAL DAILY
Qty: 30 TABLET | Refills: 0 | Status: SHIPPED | OUTPATIENT
Start: 2025-09-02